# Patient Record
Sex: MALE | Race: BLACK OR AFRICAN AMERICAN | Employment: OTHER | URBAN - METROPOLITAN AREA
[De-identification: names, ages, dates, MRNs, and addresses within clinical notes are randomized per-mention and may not be internally consistent; named-entity substitution may affect disease eponyms.]

---

## 2017-08-01 ENCOUNTER — HOSPITAL ENCOUNTER (OUTPATIENT)
Dept: GENERAL RADIOLOGY | Age: 77
Discharge: HOME | End: 2017-08-01
Payer: MEDICARE

## 2017-08-01 DIAGNOSIS — J44.9 CHRONIC OBSTRUCTIVE PULMONARY DISEASE, UNSPECIFIED COPD TYPE (HCC): ICD-10-CM

## 2017-08-01 PROCEDURE — 71020 XR CHEST PA LAT: CPT

## 2017-08-12 ENCOUNTER — HOSPITAL ENCOUNTER (EMERGENCY)
Age: 77
Discharge: HOME | End: 2017-08-12
Attending: EMERGENCY MEDICINE
Payer: MEDICARE

## 2017-08-12 ENCOUNTER — APPOINTMENT (OUTPATIENT)
Dept: GENERAL RADIOLOGY | Age: 77
End: 2017-08-12
Attending: EMERGENCY MEDICINE
Payer: MEDICARE

## 2017-08-12 VITALS
HEIGHT: 67 IN | WEIGHT: 169 LBS | OXYGEN SATURATION: 97 % | RESPIRATION RATE: 18 BRPM | BODY MASS INDEX: 26.53 KG/M2 | SYSTOLIC BLOOD PRESSURE: 139 MMHG | DIASTOLIC BLOOD PRESSURE: 83 MMHG | HEART RATE: 88 BPM | TEMPERATURE: 97.9 F

## 2017-08-12 DIAGNOSIS — R07.9 CHEST PAIN, UNSPECIFIED TYPE: ICD-10-CM

## 2017-08-12 DIAGNOSIS — I48.20 CHRONIC ATRIAL FIBRILLATION WITH RAPID VENTRICULAR RESPONSE (HCC): Primary | ICD-10-CM

## 2017-08-12 DIAGNOSIS — R79.1 SUBTHERAPEUTIC INTERNATIONAL NORMALIZED RATIO (INR): ICD-10-CM

## 2017-08-12 LAB
ALBUMIN SERPL BCP-MCNC: 3.4 G/DL (ref 3.2–4.6)
ALBUMIN/GLOB SERPL: 1 {RATIO} (ref 1.2–3.5)
ALP SERPL-CCNC: 85 U/L (ref 50–136)
ALT SERPL-CCNC: 17 U/L (ref 12–65)
ANION GAP BLD CALC-SCNC: 12 MMOL/L (ref 7–16)
AST SERPL W P-5'-P-CCNC: 21 U/L (ref 15–37)
BASOPHILS # BLD AUTO: 0 K/UL (ref 0–0.2)
BASOPHILS # BLD: 0 % (ref 0–2)
BILIRUB SERPL-MCNC: 0.5 MG/DL (ref 0.2–1.1)
BUN SERPL-MCNC: 23 MG/DL (ref 8–23)
CALCIUM SERPL-MCNC: 8.6 MG/DL (ref 8.3–10.4)
CHLORIDE SERPL-SCNC: 106 MMOL/L (ref 98–107)
CO2 SERPL-SCNC: 24 MMOL/L (ref 21–32)
CREAT SERPL-MCNC: 1.42 MG/DL (ref 0.8–1.5)
DIFFERENTIAL METHOD BLD: ABNORMAL
EOSINOPHIL # BLD: 0.2 K/UL (ref 0–0.8)
EOSINOPHIL NFR BLD: 4 % (ref 0.5–7.8)
ERYTHROCYTE [DISTWIDTH] IN BLOOD BY AUTOMATED COUNT: 15.4 % (ref 11.9–14.6)
GLOBULIN SER CALC-MCNC: 3.5 G/DL (ref 2.3–3.5)
GLUCOSE SERPL-MCNC: 104 MG/DL (ref 65–100)
HCT VFR BLD AUTO: 36 % (ref 41.1–50.3)
HGB BLD-MCNC: 12.3 G/DL (ref 13.6–17.2)
IMM GRANULOCYTES # BLD: 0 K/UL (ref 0–0.5)
IMM GRANULOCYTES NFR BLD AUTO: 0.6 % (ref 0–5)
INR PPP: 1.6 (ref 0.9–1.2)
LYMPHOCYTES # BLD AUTO: 24 % (ref 13–44)
LYMPHOCYTES # BLD: 1.3 K/UL (ref 0.5–4.6)
MCH RBC QN AUTO: 28.1 PG (ref 26.1–32.9)
MCHC RBC AUTO-ENTMCNC: 34.2 G/DL (ref 31.4–35)
MCV RBC AUTO: 82.2 FL (ref 79.6–97.8)
MONOCYTES # BLD: 0.4 K/UL (ref 0.1–1.3)
MONOCYTES NFR BLD AUTO: 8 % (ref 4–12)
NEUTS SEG # BLD: 3.4 K/UL (ref 1.7–8.2)
NEUTS SEG NFR BLD AUTO: 63 % (ref 43–78)
PLATELET # BLD AUTO: 287 K/UL (ref 150–450)
PMV BLD AUTO: 10.6 FL (ref 10.8–14.1)
POTASSIUM SERPL-SCNC: 3.8 MMOL/L (ref 3.5–5.1)
PROT SERPL-MCNC: 6.9 G/DL (ref 6.3–8.2)
PROTHROMBIN TIME: 17 SEC (ref 9.6–12)
RBC # BLD AUTO: 4.38 M/UL (ref 4.23–5.67)
SODIUM SERPL-SCNC: 142 MMOL/L (ref 136–145)
TROPONIN I SERPL-MCNC: 0.05 NG/ML (ref 0.02–0.05)
TROPONIN I SERPL-MCNC: 0.05 NG/ML (ref 0.02–0.05)
WBC # BLD AUTO: 5.4 K/UL (ref 4.3–11.1)

## 2017-08-12 PROCEDURE — 71010 XR CHEST PORT: CPT

## 2017-08-12 PROCEDURE — 96374 THER/PROPH/DIAG INJ IV PUSH: CPT | Performed by: EMERGENCY MEDICINE

## 2017-08-12 PROCEDURE — 99285 EMERGENCY DEPT VISIT HI MDM: CPT | Performed by: EMERGENCY MEDICINE

## 2017-08-12 PROCEDURE — 96376 TX/PRO/DX INJ SAME DRUG ADON: CPT | Performed by: EMERGENCY MEDICINE

## 2017-08-12 PROCEDURE — 80053 COMPREHEN METABOLIC PANEL: CPT

## 2017-08-12 PROCEDURE — 85025 COMPLETE CBC W/AUTO DIFF WBC: CPT | Performed by: EMERGENCY MEDICINE

## 2017-08-12 PROCEDURE — 85610 PROTHROMBIN TIME: CPT | Performed by: EMERGENCY MEDICINE

## 2017-08-12 PROCEDURE — 74011250636 HC RX REV CODE- 250/636: Performed by: EMERGENCY MEDICINE

## 2017-08-12 PROCEDURE — 93005 ELECTROCARDIOGRAM TRACING: CPT | Performed by: EMERGENCY MEDICINE

## 2017-08-12 PROCEDURE — 96375 TX/PRO/DX INJ NEW DRUG ADDON: CPT | Performed by: EMERGENCY MEDICINE

## 2017-08-12 PROCEDURE — 74011000250 HC RX REV CODE- 250: Performed by: EMERGENCY MEDICINE

## 2017-08-12 PROCEDURE — 84484 ASSAY OF TROPONIN QUANT: CPT

## 2017-08-12 RX ORDER — DILTIAZEM HYDROCHLORIDE 5 MG/ML
10 INJECTION INTRAVENOUS
Status: COMPLETED | OUTPATIENT
Start: 2017-08-12 | End: 2017-08-12

## 2017-08-12 RX ORDER — ONDANSETRON 2 MG/ML
4 INJECTION INTRAMUSCULAR; INTRAVENOUS
Status: COMPLETED | OUTPATIENT
Start: 2017-08-12 | End: 2017-08-12

## 2017-08-12 RX ORDER — MORPHINE SULFATE 4 MG/ML
4 INJECTION, SOLUTION INTRAMUSCULAR; INTRAVENOUS
Status: COMPLETED | OUTPATIENT
Start: 2017-08-12 | End: 2017-08-12

## 2017-08-12 RX ADMIN — DILTIAZEM HYDROCHLORIDE 10 MG: 5 INJECTION INTRAVENOUS at 18:21

## 2017-08-12 RX ADMIN — MORPHINE SULFATE 4 MG: 4 INJECTION, SOLUTION INTRAMUSCULAR; INTRAVENOUS at 18:21

## 2017-08-12 RX ADMIN — ONDANSETRON 4 MG: 2 INJECTION INTRAMUSCULAR; INTRAVENOUS at 18:21

## 2017-08-12 RX ADMIN — DILTIAZEM HYDROCHLORIDE 10 MG: 5 INJECTION INTRAVENOUS at 19:22

## 2017-08-12 NOTE — ED PROVIDER NOTES
HPI Comments: 28-year-old Afro-American male complaining of chest pain. Some trouble breathing. Onset last night versus this morning. Fairly constant in nature. History of ANGELA Peoples chronically and on Coumadin. RN notes states that he was in his doctor's office earlier today that he now admits he was in the [facility] ER earlier today. Patient is a 76 y.o. male presenting with chest pain. The history is provided by the patient. Chest Pain (Angina)    This is a new problem. Episode onset: last night. The problem has not changed since onset. The problem occurs constantly. The pain is present in the substernal region. The pain is mild. The quality of the pain is described as pressure-like. The pain does not radiate. Associated symptoms include lower extremity edema, palpitations and shortness of breath. Pertinent negatives include no abdominal pain, no back pain, no cough, no diaphoresis, no fever, no headaches, no hemoptysis, no leg pain, no nausea, no numbness, no vomiting and no weakness. He has tried nothing for the symptoms. Pertinent negatives include no cardiac stents. Past Medical History:   Diagnosis Date    Chronic obstructive pulmonary disease ([address]     Dyspnea 7/21/2016       History reviewed. No pertinent surgical history. History reviewed. No pertinent family history. Social History     Social History    Marital status:      Spouse name: N/A    Number of children: N/A    Years of education: N/A     Occupational History    Not on file. Social History Main Topics    Smoking status: Never Smoker    Smokeless tobacco: Never Used    Alcohol use No    Drug use: Not on file    Sexual activity: Not on file     Other Topics Concern    Not on file     Social History Narrative         ALLERGIES: Levaquin [levofloxacin] and Prednisone    Review of Systems   Constitutional: Negative for chills, diaphoresis and fever.    HENT: Negative for congestion, rhinorrhea and sore throat. Eyes: Negative for photophobia and redness. Respiratory: Positive for shortness of breath. Negative for cough and hemoptysis. Cardiovascular: Positive for chest pain and palpitations. Negative for leg swelling. Gastrointestinal: Negative for abdominal pain, diarrhea, nausea and vomiting. Endocrine: Negative for polydipsia and polyuria. Genitourinary: Negative for dysuria. Musculoskeletal: Negative for back pain and myalgias. Neurological: Negative for weakness, numbness and headaches. Vitals:    08/12/17 1702   BP: 125/85   Pulse: 95   Resp: 18   Temp: 97.8 °F (36.6 °C)   SpO2: 99%   Weight: 76.7 kg (169 lb)   Height: 5' 7\" (1.702 m)            Physical Exam   Constitutional: He is oriented to person, place, and time. He appears well-developed and well-nourished. HENT:   Mouth/Throat: Oropharynx is clear and moist. No oropharyngeal exudate. Eyes: Conjunctivae are normal. Pupils are equal, round, and reactive to light. Neck: Neck supple. Cardiovascular: Normal heart sounds. An irregularly irregular rhythm present. Tachycardia present. Pulmonary/Chest: Effort normal and breath sounds normal.   Abdominal: Soft. Bowel sounds are normal. He exhibits no distension. There is no tenderness. There is no rebound and no guarding. Musculoskeletal: Normal range of motion. He exhibits no edema or tenderness. Lymphadenopathy:     He has no cervical adenopathy. Neurological: He is alert and oriented to person, place, and time. Skin: Skin is warm and dry. Nursing note and vitals reviewed. MDM  Number of Diagnoses or Management Options  Chest pain, unspecified type:   Chronic atrial fibrillation with rapid ventricular response ([address]):   Subtherapeutic international normalized ratio (INR):   Diagnosis management comments: This pain and chronic A. Fib. Cardizem to slow  rate a little. Rule out MI.   Review of notes from [facility] reveal it was a primarily social visit- patient complaining of not being able to care for himself at home and a niece and nephew are not willing to help. Social work was involved and sought avenues for help. Ultimately the patient was discharged home with instructions to follow-up with PCP and seek assisted living options. 9:48 PM  Troponin ×2 negative. Rate now controlled. We'll recommend they increase dose of Coumadin this evening. Have arranged for outpatient follow-up with the cardiology office in the next 72 hours for an INR and heart rate recheck.        Amount and/or Complexity of Data Reviewed  Clinical lab tests: ordered and reviewed (Results for orders placed or performed during the hospital encounter of 08/12/17  -CBC WITH AUTOMATED DIFF       Result                                            Value                         Ref Range                       WBC                                               5.4                           4.3 - 11.1 K/uL                 RBC                                               4.38                          4.23 - 5.67 M/uL                HGB                                               12.3 (L)                      13.6 - 17.2 g/dL                HCT                                               36.0 (L)                      41.1 - 50.3 %                   MCV                                               82.2                          79.6 - 97.8 FL                  MCH                                               28.1                          26.1 - 32.9 PG                  MCHC                                              34.2                          31.4 - 35.0 g/dL                RDW                                               15.4 (H)                      11.9 - 14.6 %                   PLATELET                                          287                           150 - 450 K/uL                  MPV                                               10.6 (L)                      10.8 - 14.1 FL DF                                                AUTOMATED                                                     NEUTROPHILS                                       63                            43 - 78 %                       LYMPHOCYTES                                       24                            13 - 44 %                       MONOCYTES                                         8                             4.0 - 12.0 %                    EOSINOPHILS                                       4                             0.5 - 7.8 %                     BASOPHILS                                         0                             0.0 - 2.0 %                     IMMATURE GRANULOCYTES                             0.6                           0.0 - 5.0 %                     ABS. NEUTROPHILS                                  3.4                           1.7 - 8.2 K/UL                  ABS. LYMPHOCYTES                                  1.3                           0.5 - 4.6 K/UL                  ABS. MONOCYTES                                    0.4                           0.1 - 1.3 K/UL                  ABS. EOSINOPHILS                                  0.2                           0.0 - 0.8 K/UL                  ABS. BASOPHILS                                    0.0                           0.0 - 0.2 K/UL                  ABS. IMM.  GRANS.                                  0.0                           0.0 - 0.5 K/UL             -PROTHROMBIN TIME + INR       Result                                            Value                         Ref Range                       Prothrombin time                                  17.0 (H)                      9.6 - 12.0 sec                  INR                                               1.6 (H)                       0.9 - 1.2                  -METABOLIC PANEL, COMPREHENSIVE       Result                                            Value                         Ref Range Sodium                                            142                           136 - 145 mmol/L                Potassium                                         3.8                           3.5 - 5.1 mmol/L                Chloride                                          106                           98 - 107 mmol/L                 CO2                                               24                            21 - 32 mmol/L                  Anion gap                                         12                            7 - 16 mmol/L                   Glucose                                           104 (H)                       65 - 100 mg/dL                  BUN                                               23                            8 - 23 MG/DL                    Creatinine                                        1.42                          0.8 - 1.5 MG/DL                 GFR est AA                                        >60                           >60 ml/min/1.73m2               GFR est non-AA                                    51 (L)                        >60 ml/min/1.73m2               Calcium                                           8.6                           8.3 - 10.4 MG/DL                Bilirubin, total                                  0.5                           0.2 - 1.1 MG/DL                 ALT (SGPT)                                        17                            12 - 65 U/L                     AST (SGOT)                                        21                            15 - 37 U/L                     Alk. phosphatase                                  85                            50 - 136 U/L                    Protein, total                                    6.9                           6.3 - 8.2 g/dL                  Albumin                                           3.4                           3.2 - 4.6 g/dL                  Globulin 3.5                           2.3 - 3.5 g/dL                  A-G Ratio                                         1.0 (L)                       1.2 - 3.5                  -TROPONIN I       Result                                            Value                         Ref Range                       Troponin-I, Qt.                                   0.05                          0.02 - 0.05 NG/ML          -TROPONIN I       Result                                            Value                         Ref Range                       Troponin-I, Qt.                                   0.05                          0.02 - 0.05 NG/ML          )  Tests in the radiology section of CPT®: ordered and reviewed (Xr Chest Port    Result Date: 8/12/2017  HISTORY: Chest pain and shortness of breath beginning today. EXAM: AP chest radiograph PRIOR STUDY: 08/01/2017 FINDINGS: The cardiomediastinal silhouette is normal. There is unchanged elevation of the left hemidiaphragm. No focal consolidation, pleural effusion, or pneumothorax. IMPRESSION: No acute cardiopulmonary process.     )      ED Course       Procedures

## 2017-08-12 NOTE — ED TRIAGE NOTES
Pt states he was at his md office earlier today and was told that his heart was beating too fast. Pt also states he is having chest pain that started today. Pt has second complaint of right hip and lower back pain.

## 2017-08-13 LAB
ATRIAL RATE: 88 BPM
CALCULATED R AXIS, ECG10: -42 DEGREES
CALCULATED T AXIS, ECG11: 85 DEGREES
DIAGNOSIS, 93000: NORMAL
Q-T INTERVAL, ECG07: 326 MS
QRS DURATION, ECG06: 108 MS
QTC CALCULATION (BEZET), ECG08: 466 MS
VENTRICULAR RATE, ECG03: 123 BPM

## 2017-08-13 NOTE — ED NOTES
I have reviewed discharge instructions with the patient. The patient verbalized understanding. Patient ambulatory upon discharge to waiting room to await ride home.

## 2017-08-13 NOTE — DISCHARGE INSTRUCTIONS
Atrial Fibrillation: Care Instructions  Your Care Instructions    Atrial fibrillation is an irregular and often fast heartbeat. Treating this condition is important for several reasons. It can cause blood clots, which can travel from your heart to your brain and cause a stroke. If you have a fast heartbeat, you may feel lightheaded, dizzy, and weak. An irregular heartbeat can also increase your risk for heart failure. Atrial fibrillation is often the result of another heart condition, such as high blood pressure or coronary artery disease. Making changes to improve your heart condition will help you stay healthy and active. Follow-up care is a key part of your treatment and safety. Be sure to make and go to all appointments, and call your doctor if you are having problems. It's also a good idea to know your test results and keep a list of the medicines you take. How can you care for yourself at home? Medicines  · Take your medicines exactly as prescribed. Call your doctor if you think you are having a problem with your medicine. You will get more details on the specific medicines your doctor prescribes. · If your doctor has given you a blood thinner to prevent a stroke, be sure you get instructions about how to take your medicine safely. Blood thinners can cause serious bleeding problems. · Do not take any vitamins, over-the-counter drugs, or herbal products without talking to your doctor first.  Lifestyle changes  · Do not smoke. Smoking can increase your chance of a stroke and heart attack. If you need help quitting, talk to your doctor about stop-smoking programs and medicines. These can increase your chances of quitting for good. · Eat a heart-healthy diet. · Stay at a healthy weight. Lose weight if you need to. · Limit alcohol to 2 drinks a day for men and 1 drink a day for women. Too much alcohol can cause health problems. · Avoid colds and flu. Get a pneumococcal vaccine shot.  If you have had one before, ask your doctor whether you need another dose. Get a flu shot every year. If you must be around people with colds or flu, wash your hands often. Activity  · If your doctor recommends it, get more exercise. Walking is a good choice. Bit by bit, increase the amount you walk every day. Try for at least 30 minutes on most days of the week. You also may want to swim, bike, or do other activities. Your doctor may suggest that you join a cardiac rehabilitation program so that you can have help increasing your physical activity safely. · Start light exercise if your doctor says it is okay. Even a small amount will help you get stronger, have more energy, and manage stress. Walking is an easy way to get exercise. Start out by walking a little more than you did in the hospital. Gradually increase the amount you walk. · When you exercise, watch for signs that your heart is working too hard. You are pushing too hard if you cannot talk while you are exercising. If you become short of breath or dizzy or have chest pain, sit down and rest immediately. · Check your pulse regularly. Place two fingers on the artery at the palm side of your wrist, in line with your thumb. If your heartbeat seems uneven or fast, talk to your doctor. When should you call for help? Call 911 anytime you think you may need emergency care. For example, call if:  · You have symptoms of a heart attack. These may include:  ¨ Chest pain or pressure, or a strange feeling in the chest.  ¨ Sweating. ¨ Shortness of breath. ¨ Nausea or vomiting. ¨ Pain, pressure, or a strange feeling in the back, neck, jaw, or upper belly or in one or both shoulders or arms. ¨ Lightheadedness or sudden weakness. ¨ A fast or irregular heartbeat. After you call 911, the  may tell you to chew 1 adult-strength or 2 to 4 low-dose aspirin. Wait for an ambulance. Do not try to drive yourself. · You have symptoms of a stroke.  These may include:  ¨ Sudden numbness, tingling, weakness, or loss of movement in your face, arm, or leg, especially on only one side of your body. ¨ Sudden vision changes. ¨ Sudden trouble speaking. ¨ Sudden confusion or trouble understanding simple statements. ¨ Sudden problems with walking or balance. ¨ A sudden, severe headache that is different from past headaches. · You passed out (lost consciousness). Call your doctor now or seek immediate medical care if:  · You have new or increased shortness of breath. · You feel dizzy or lightheaded, or you feel like you may faint. · Your heart rate becomes irregular. · You can feel your heart flutter in your chest or skip heartbeats. Tell your doctor if these symptoms are new or worse. Watch closely for changes in your health, and be sure to contact your doctor if you have any problems. Where can you learn more? Go to http://mary-les.info/. Enter U020 in the search box to learn more about \"Atrial Fibrillation: Care Instructions. \"  Current as of: September 21, 2016  Content Version: 11.3  © 7735-8733 [employer]. Care instructions adapted under license by [employer] (which disclaims liability or warranty for this information). If you have questions about a medical condition or this instruction, always ask your healthcare professional. [address] any warranty or liability for your use of this information.

## 2017-08-17 ENCOUNTER — APPOINTMENT (OUTPATIENT)
Dept: GENERAL RADIOLOGY | Age: 77
End: 2017-08-17
Attending: EMERGENCY MEDICINE
Payer: MEDICARE

## 2017-08-17 ENCOUNTER — HOSPITAL ENCOUNTER (EMERGENCY)
Age: 77
Discharge: HOME | End: 2017-08-17
Attending: EMERGENCY MEDICINE
Payer: MEDICARE

## 2017-08-17 VITALS
WEIGHT: 159 LBS | TEMPERATURE: 98.2 F | HEART RATE: 87 BPM | HEIGHT: 67 IN | OXYGEN SATURATION: 100 % | SYSTOLIC BLOOD PRESSURE: 139 MMHG | DIASTOLIC BLOOD PRESSURE: 83 MMHG | RESPIRATION RATE: 21 BRPM | BODY MASS INDEX: 24.96 KG/M2

## 2017-08-17 DIAGNOSIS — R07.9 CHEST PAIN, UNSPECIFIED TYPE: Primary | ICD-10-CM

## 2017-08-17 LAB
ALBUMIN SERPL-MCNC: 3.5 G/DL (ref 3.2–4.6)
ALBUMIN/GLOB SERPL: 1 {RATIO} (ref 1.2–3.5)
ALP SERPL-CCNC: 81 U/L (ref 50–136)
ALT SERPL-CCNC: 14 U/L (ref 12–65)
ANION GAP SERPL CALC-SCNC: 12 MMOL/L (ref 7–16)
AST SERPL-CCNC: 17 U/L (ref 15–37)
ATRIAL RATE: 208 BPM
ATRIAL RATE: 326 BPM
BASOPHILS # BLD: 0 K/UL (ref 0–0.2)
BASOPHILS NFR BLD: 1 % (ref 0–2)
BILIRUB SERPL-MCNC: 0.7 MG/DL (ref 0.2–1.1)
BNP SERPL-MCNC: 69 PG/ML
BUN SERPL-MCNC: 26 MG/DL (ref 8–23)
CALCIUM SERPL-MCNC: 9.2 MG/DL (ref 8.3–10.4)
CALCULATED R AXIS, ECG10: -39 DEGREES
CALCULATED R AXIS, ECG10: -42 DEGREES
CALCULATED T AXIS, ECG11: -24 DEGREES
CALCULATED T AXIS, ECG11: -33 DEGREES
CHLORIDE SERPL-SCNC: 106 MMOL/L (ref 98–107)
CO2 SERPL-SCNC: 21 MMOL/L (ref 21–32)
CREAT SERPL-MCNC: 1.71 MG/DL (ref 0.8–1.5)
D DIMER PPP FEU-MCNC: 0.2 UG/ML(FEU)
DIAGNOSIS, 93000: NORMAL
DIAGNOSIS, 93000: NORMAL
DIFFERENTIAL METHOD BLD: ABNORMAL
EOSINOPHIL # BLD: 0 K/UL (ref 0–0.8)
EOSINOPHIL NFR BLD: 1 % (ref 0.5–7.8)
ERYTHROCYTE [DISTWIDTH] IN BLOOD BY AUTOMATED COUNT: 14.5 % (ref 11.9–14.6)
GLOBULIN SER CALC-MCNC: 3.6 G/DL (ref 2.3–3.5)
GLUCOSE SERPL-MCNC: 110 MG/DL (ref 65–100)
HCT VFR BLD AUTO: 35 % (ref 41.1–50.3)
HGB BLD-MCNC: 11.7 G/DL (ref 13.6–17.2)
IMM GRANULOCYTES # BLD: 0 K/UL (ref 0–0.5)
IMM GRANULOCYTES NFR BLD: 0.4 % (ref 0–5)
INR PPP: 2.5 (ref 0.9–1.2)
LYMPHOCYTES # BLD: 1 K/UL (ref 0.5–4.6)
LYMPHOCYTES NFR BLD: 20 % (ref 13–44)
MCH RBC QN AUTO: 28.3 PG (ref 26.1–32.9)
MCHC RBC AUTO-ENTMCNC: 33.4 G/DL (ref 31.4–35)
MCV RBC AUTO: 84.5 FL (ref 79.6–97.8)
MONOCYTES # BLD: 0.3 K/UL (ref 0.1–1.3)
MONOCYTES NFR BLD: 7 % (ref 4–12)
NEUTS SEG # BLD: 3.5 K/UL (ref 1.7–8.2)
NEUTS SEG NFR BLD: 71 % (ref 43–78)
PLATELET # BLD AUTO: 271 K/UL (ref 150–450)
PMV BLD AUTO: 9.3 FL (ref 10.8–14.1)
POTASSIUM SERPL-SCNC: 4 MMOL/L (ref 3.5–5.1)
PROT SERPL-MCNC: 7.1 G/DL (ref 6.3–8.2)
PROTHROMBIN TIME: 27 SEC (ref 9.6–12)
Q-T INTERVAL, ECG07: 354 MS
Q-T INTERVAL, ECG07: 360 MS
QRS DURATION, ECG06: 104 MS
QRS DURATION, ECG06: 106 MS
QTC CALCULATION (BEZET), ECG08: 411 MS
QTC CALCULATION (BEZET), ECG08: 435 MS
RBC # BLD AUTO: 4.14 M/UL (ref 4.23–5.67)
SODIUM SERPL-SCNC: 139 MMOL/L (ref 136–145)
TROPONIN I BLD-MCNC: 0.01 NG/ML (ref 0–0.08)
TROPONIN I SERPL-MCNC: 0.04 NG/ML (ref 0.02–0.05)
VENTRICULAR RATE, ECG03: 81 BPM
VENTRICULAR RATE, ECG03: 88 BPM
WBC # BLD AUTO: 4.9 K/UL (ref 4.3–11.1)

## 2017-08-17 PROCEDURE — 83880 ASSAY OF NATRIURETIC PEPTIDE: CPT | Performed by: EMERGENCY MEDICINE

## 2017-08-17 PROCEDURE — 85025 COMPLETE CBC W/AUTO DIFF WBC: CPT | Performed by: EMERGENCY MEDICINE

## 2017-08-17 PROCEDURE — 85610 PROTHROMBIN TIME: CPT | Performed by: EMERGENCY MEDICINE

## 2017-08-17 PROCEDURE — 80053 COMPREHEN METABOLIC PANEL: CPT | Performed by: EMERGENCY MEDICINE

## 2017-08-17 PROCEDURE — 99284 EMERGENCY DEPT VISIT MOD MDM: CPT | Performed by: EMERGENCY MEDICINE

## 2017-08-17 PROCEDURE — 93005 ELECTROCARDIOGRAM TRACING: CPT | Performed by: EMERGENCY MEDICINE

## 2017-08-17 PROCEDURE — 71020 XR CHEST PA LAT: CPT

## 2017-08-17 PROCEDURE — 84484 ASSAY OF TROPONIN QUANT: CPT | Performed by: EMERGENCY MEDICINE

## 2017-08-17 PROCEDURE — 85379 FIBRIN DEGRADATION QUANT: CPT | Performed by: EMERGENCY MEDICINE

## 2017-08-17 NOTE — ED PROVIDER NOTES
HPI Comments: 76 male history of COPD, congestive heart failure, CAD is still complaining of chest pain. Started approximately 8:30 AM.  No syncopal episode. Associated shortness of breath. No leg swelling. Denies any weight gain. Stating he's been taken all of his medication as prescribed. Saw the cardiologist yesterday for A. Fib. I told him to change his Cardizem to taken it in the morning. He denies any fever. He took 2 sublingual nitroglycerin 1 at approximately 9:00 and one at 910 with complete resolution of pain. Had a complaining of headaches after taking the nitroglycerin. Stated right now his chest pain is slightly coming back. Currently 1 out of 10. Midsternal.  He has history of chronic shortness of breath. That has not changed in nature. Denies any coughing, fever. Patient is a 76 y.o. male presenting with chest pain. The history is provided by the patient. Chest Pain    Associated symptoms include shortness of breath. Pertinent negatives include no abdominal pain, no back pain, no diaphoresis, no dizziness, no fever, no headaches, no nausea and no vomiting. Past Medical History:   Diagnosis Date    [facility] obstructive pulmonary disease ([facility] [address]     Dyspnea 7/21/2016    Gastrointestinal disorder     Heart failure ([facility] [address]        Past Surgical History:   Procedure Laterality Date    HX GI      HX ORTHOPAEDIC           History reviewed. No pertinent family history. Social History     Social History    Marital status:      Spouse name: N/A    Number of children: N/A    Years of education: N/A     Occupational History    Not on file.      Social History Main Topics    Smoking status: Never Smoker    Smokeless tobacco: Never Used    Alcohol use No    Drug use: Not on file    Sexual activity: Not on file     Other Topics Concern    Not on file     Social History Narrative         ALLERGIES: Levaquin [levofloxacin] and Prednisone    Review of Systems Constitutional: Negative for activity change, appetite change, diaphoresis and fever. HENT: Negative for congestion and ear pain. Eyes: Negative for discharge. Respiratory: Positive for shortness of breath. Negative for chest tightness. Cardiovascular: Positive for chest pain. Gastrointestinal: Negative for abdominal pain, constipation, diarrhea, nausea and vomiting. Genitourinary: Negative for difficulty urinating, flank pain, hematuria and testicular pain. Musculoskeletal: Negative for back pain. Skin: Negative for color change, pallor and rash. Neurological: Negative for dizziness, seizures, syncope and headaches. All other systems reviewed and are negative. Vitals:    08/17/17 1144   BP: 107/80   Pulse: 89   Resp: 16   Temp: 98.5 °F (36.9 °C)   SpO2: 98%   Weight: 72.1 kg (159 lb)   Height: 5' 7\" (1.702 m)            Physical Exam   Constitutional: He is oriented to person, place, and time. He appears well-developed and well-nourished. HENT:   Mouth/Throat: Oropharynx is clear and moist. No oropharyngeal exudate. Eyes: Conjunctivae are normal. Pupils are equal, round, and reactive to light. Neck: Normal range of motion. Neck supple. Cardiovascular: Normal rate, regular rhythm and normal heart sounds. Pulmonary/Chest: Effort normal and breath sounds normal.   Abdominal: Soft. Bowel sounds are normal. He exhibits no distension and no mass. There is no tenderness. There is no rebound and no guarding. Musculoskeletal: Normal range of motion. He exhibits no edema or tenderness. Lymphadenopathy:     He has no cervical adenopathy. Neurological: He is alert and oriented to person, place, and time. Skin: Skin is warm and dry. Nursing note and vitals reviewed. MDM  Number of Diagnoses or Management Options  Diagnosis management comments: EKG obtained and interpreted by me. Rate of 88. Atrial fibrillation. Left axis deviation.   No ST elevation that is suspicious for acute STEMI ischemia. Nonspecific T-wave changes noted laterally. Unchanged from previous EKG. Initial troponin 0.04. His previous troponin were 0.05. Relatively unchanged renal function. No abdominal pain on exam.  Equal radial pulses. Have a low suspicion for dissection. Chest x-ray without any obvious consolidation, signs of pneumothorax. We'll send BNP. We'll reassess. He took 4 baby aspirin today as well. He is on Coumadin. INR 2.5. Amount and/or Complexity of Data Reviewed  Clinical lab tests: reviewed and ordered  Tests in the radiology section of CPT®: ordered and reviewed      ED Course     Results for orders placed or performed during the hospital encounter of 08/17/17   PROTHROMBIN TIME + INR   Result Value Ref Range    Prothrombin time 27.0 (H) 9.6 - 12.0 sec    INR 2.5 (H) 0.9 - 1.2     TROPONIN I   Result Value Ref Range    Troponin-I, Qt. 0.04 0.02 - 0.05 NG/ML   CBC WITH AUTOMATED DIFF   Result Value Ref Range    WBC 4.9 4.3 - 11.1 K/uL    RBC 4.14 (L) 4.23 - 5.67 M/uL    HGB 11.7 (L) 13.6 - 17.2 g/dL    HCT 35.0 (L) 41.1 - 50.3 %    MCV 84.5 79.6 - 97.8 FL    MCH 28.3 26.1 - 32.9 PG    MCHC 33.4 31.4 - 35.0 g/dL    RDW 14.5 11.9 - 14.6 %    PLATELET 091 972 - 431 K/uL    MPV 9.3 (L) 10.8 - 14.1 FL    DF AUTOMATED      NEUTROPHILS 71 43 - 78 %    LYMPHOCYTES 20 13 - 44 %    MONOCYTES 7 4.0 - 12.0 %    EOSINOPHILS 1 0.5 - 7.8 %    BASOPHILS 1 0.0 - 2.0 %    IMMATURE GRANULOCYTES 0.4 0.0 - 5.0 %    ABS. NEUTROPHILS 3.5 1.7 - 8.2 K/UL    ABS. LYMPHOCYTES 1.0 0.5 - 4.6 K/UL    ABS. MONOCYTES 0.3 0.1 - 1.3 K/UL    ABS. EOSINOPHILS 0.0 0.0 - 0.8 K/UL    ABS. BASOPHILS 0.0 0.0 - 0.2 K/UL    ABS. IMM.  GRANS. 0.0 0.0 - 0.5 K/UL   METABOLIC PANEL, COMPREHENSIVE   Result Value Ref Range    Sodium 139 136 - 145 mmol/L    Potassium 4.0 3.5 - 5.1 mmol/L    Chloride 106 98 - 107 mmol/L    CO2 21 21 - 32 mmol/L    Anion gap 12 7 - 16 mmol/L    Glucose 110 (H) 65 - 100 mg/dL    BUN 26 (H) 8 - 23 MG/DL    Creatinine 1.71 (H) 0.8 - 1.5 MG/DL    GFR est AA 50 (L) >60 ml/min/1.73m2    GFR est non-AA 41 (L) >60 ml/min/1.73m2    Calcium 9.2 8.3 - 10.4 MG/DL    Bilirubin, total 0.7 0.2 - 1.1 MG/DL    ALT (SGPT) 14 12 - 65 U/L    AST (SGOT) 17 15 - 37 U/L    Alk. phosphatase 81 50 - 136 U/L    Protein, total 7.1 6.3 - 8.2 g/dL    Albumin 3.5 3.2 - 4.6 g/dL    Globulin 3.6 (H) 2.3 - 3.5 g/dL    A-G Ratio 1.0 (L) 1.2 - 3.5     EKG, 12 LEAD, INITIAL   Result Value Ref Range    Ventricular Rate 88 BPM    Atrial Rate 208 BPM    QRS Duration 106 ms    Q-T Interval 360 ms    QTC Calculation (Bezet) 435 ms    Calculated R Axis -39 degrees    Calculated T Axis -24 degrees    Diagnosis       Atrial fibrillation  Left axis deviation  Voltage criteria for left ventricular hypertrophy  Nonspecific T wave abnormality  Abnormal ECG  When compared with ECG of 12-AUG-2017 17:03,  ST now depressed in Inferior leads  T wave inversion now evident in Inferior leads  Nonspecific T wave abnormality now evident in Anterior leads  Nonspecific T wave abnormality, improved in Lateral leads         XR CHEST PA LAT   Final Result   IMPRESSION: No acute findings in the chest           1600 - EKG #2 obtained and interpreted by me. Rate of 81. A. Fib. Left axis deviation. No ST changes status consistent with acute STEMI ischemia. Nonspecific T wave inversion noted laterally unchanged from EKG #1 and from his previous EKG. Serial troponins are negative. It is down trending. I do not suspect this is an acute ACS episode. He was recently seen by his cardiologist.  At this time I will discharge him home. We'll call the appetite hour message line for [facility] cardiology for follow-up with this patient. He is stable. He has no chest pain. He is stable for discharge at this time. Dragon voice recognition software was used to create this note.  Although the note has been reviewed and corrected where necessary, additional errors may have been overlooked and remain in the text.     Procedures

## 2017-08-17 NOTE — DISCHARGE INSTRUCTIONS
Chest Pain: Care Instructions  Your Care Instructions  There are many things that can cause chest pain. Some are not serious and will get better on their own in a few days. But some kinds of chest pain need more testing and treatment. Your doctor may have recommended a follow-up visit in the next 8 to 12 hours. If you are not getting better, you may need more tests or treatment. Even though your doctor has released you, you still need to watch for any problems. The doctor carefully checked you, but sometimes problems can develop later. If you have new symptoms or if your symptoms do not get better, get medical care right away. If you have worse or different chest pain or pressure that lasts more than 5 minutes or you passed out (lost consciousness), call 911 or seek other emergency help right away. A medical visit is only one step in your treatment. Even if you feel better, you still need to do what your doctor recommends, such as going to all suggested follow-up appointments and taking medicines exactly as directed. This will help you recover and help prevent future problems. How can you care for yourself at home? · Rest until you feel better. · Take your medicine exactly as prescribed. Call your doctor if you think you are having a problem with your medicine. · Do not drive after taking a prescription pain medicine. When should you call for help? Call 911 if:  · You passed out (lost consciousness). · You have severe difficulty breathing. · You have symptoms of a heart attack. These may include:  ¨ Chest pain or pressure, or a strange feeling in your chest.  ¨ Sweating. ¨ Shortness of breath. ¨ Nausea or vomiting. ¨ Pain, pressure, or a strange feeling in your back, neck, jaw, or upper belly or in one or both shoulders or arms. ¨ Lightheadedness or sudden weakness. ¨ A fast or irregular heartbeat.   After you call 911, the  may tell you to chew 1 adult-strength or 2 to 4 low-dose aspirin. Wait for an ambulance. Do not try to drive yourself. Call your doctor today if:  · You have any trouble breathing. · Your chest pain gets worse. · You are dizzy or lightheaded, or you feel like you may faint. · You are not getting better as expected. · You are having new or different chest pain. Where can you learn more? Go to http://amry-les.info/. Enter A120 in the search box to learn more about \"Chest Pain: Care Instructions. \"  Current as of: March 20, 2017  Content Version: 11.3  © 3030-9764 [employer]. Care instructions adapted under license by [employer] (which disclaims liability or warranty for this information). If you have questions about a medical condition or this instruction, always ask your healthcare professional. [address] any warranty or liability for your use of this information.

## 2018-01-22 ENCOUNTER — ANESTHESIA (OUTPATIENT)
Dept: SURGERY | Age: 78
End: 2018-01-22
Payer: MEDICARE

## 2018-01-22 ENCOUNTER — HOSPITAL ENCOUNTER (OUTPATIENT)
Age: 78
Setting detail: OBSERVATION
Discharge: NURSING, REHAB OR LONG-TERM CARE | End: 2018-01-26
Attending: INTERNAL MEDICINE | Admitting: INTERNAL MEDICINE
Payer: MEDICARE

## 2018-01-22 ENCOUNTER — APPOINTMENT (OUTPATIENT)
Dept: CARDIAC CATH/INVASIVE PROCEDURES | Age: 78
End: 2018-01-22
Payer: MEDICARE

## 2018-01-22 ENCOUNTER — ANESTHESIA EVENT (OUTPATIENT)
Dept: SURGERY | Age: 78
End: 2018-01-22
Payer: MEDICARE

## 2018-01-22 ENCOUNTER — APPOINTMENT (OUTPATIENT)
Dept: GENERAL RADIOLOGY | Age: 78
End: 2018-01-22
Attending: INTERNAL MEDICINE
Payer: MEDICARE

## 2018-01-22 PROBLEM — I42.9 CARDIOMYOPATHY (HCC): Status: ACTIVE | Noted: 2018-01-22

## 2018-01-22 LAB
ALBUMIN SERPL-MCNC: 3.5 G/DL (ref 3.2–4.6)
ALBUMIN/GLOB SERPL: 0.8 {RATIO} (ref 1.2–3.5)
ALP SERPL-CCNC: 94 U/L (ref 50–136)
ALT SERPL-CCNC: 14 U/L (ref 12–65)
ANION GAP SERPL CALC-SCNC: 14 MMOL/L (ref 7–16)
AST SERPL-CCNC: 18 U/L (ref 15–37)
ATRIAL RATE: 108 BPM
ATRIAL RATE: 119 BPM
BILIRUB SERPL-MCNC: 0.4 MG/DL (ref 0.2–1.1)
BUN SERPL-MCNC: 33 MG/DL (ref 8–23)
CALCIUM SERPL-MCNC: 9.4 MG/DL (ref 8.3–10.4)
CALCULATED R AXIS, ECG10: -43 DEGREES
CALCULATED R AXIS, ECG10: -46 DEGREES
CALCULATED T AXIS, ECG11: 58 DEGREES
CALCULATED T AXIS, ECG11: 96 DEGREES
CHLORIDE SERPL-SCNC: 107 MMOL/L (ref 98–107)
CO2 SERPL-SCNC: 19 MMOL/L (ref 21–32)
CREAT SERPL-MCNC: 1.24 MG/DL (ref 0.8–1.5)
DIAGNOSIS, 93000: NORMAL
DIAGNOSIS, 93000: NORMAL
ERYTHROCYTE [DISTWIDTH] IN BLOOD BY AUTOMATED COUNT: 13.9 % (ref 11.9–14.6)
GLOBULIN SER CALC-MCNC: 4.4 G/DL (ref 2.3–3.5)
GLUCOSE SERPL-MCNC: 98 MG/DL (ref 65–100)
HCT VFR BLD AUTO: 39.9 % (ref 41.1–50.3)
HGB BLD-MCNC: 13.2 G/DL (ref 13.6–17.2)
INR PPP: 2.1
MAGNESIUM SERPL-MCNC: 1.9 MG/DL (ref 1.8–2.4)
MCH RBC QN AUTO: 29.1 PG (ref 26.1–32.9)
MCHC RBC AUTO-ENTMCNC: 33.1 G/DL (ref 31.4–35)
MCV RBC AUTO: 88.1 FL (ref 79.6–97.8)
PLATELET # BLD AUTO: 238 K/UL (ref 150–450)
PMV BLD AUTO: 9.1 FL (ref 10.8–14.1)
POTASSIUM SERPL-SCNC: 4.4 MMOL/L (ref 3.5–5.1)
PROT SERPL-MCNC: 7.9 G/DL (ref 6.3–8.2)
PROTHROMBIN TIME: 23.1 SEC (ref 11.5–14.5)
Q-T INTERVAL, ECG07: 312 MS
Q-T INTERVAL, ECG07: 320 MS
QRS DURATION, ECG06: 104 MS
QRS DURATION, ECG06: 110 MS
QTC CALCULATION (BEZET), ECG08: 430 MS
QTC CALCULATION (BEZET), ECG08: 452 MS
RBC # BLD AUTO: 4.53 M/UL (ref 4.23–5.67)
SODIUM SERPL-SCNC: 140 MMOL/L (ref 136–145)
VENTRICULAR RATE, ECG03: 114 BPM
VENTRICULAR RATE, ECG03: 120 BPM
WBC # BLD AUTO: 7.1 K/UL (ref 4.3–11.1)

## 2018-01-22 PROCEDURE — 33225 L VENTRIC PACING LEAD ADD-ON: CPT

## 2018-01-22 PROCEDURE — C1892 INTRO/SHEATH,FIXED,PEEL-AWAY: HCPCS

## 2018-01-22 PROCEDURE — 77030013687 HC GD NDL BARD -B

## 2018-01-22 PROCEDURE — C1773 RET DEV, INSERTABLE: HCPCS

## 2018-01-22 PROCEDURE — 74011000272 HC RX REV CODE- 272: Performed by: INTERNAL MEDICINE

## 2018-01-22 PROCEDURE — 74011250636 HC RX REV CODE- 250/636: Performed by: INTERNAL MEDICINE

## 2018-01-22 PROCEDURE — 74011250636 HC RX REV CODE- 250/636

## 2018-01-22 PROCEDURE — 33208 INSRT HEART PM ATRIAL & VENT: CPT

## 2018-01-22 PROCEDURE — 85027 COMPLETE CBC AUTOMATED: CPT | Performed by: INTERNAL MEDICINE

## 2018-01-22 PROCEDURE — 77030016570 HC BLNKT BAIR HGGR 3M -B: Performed by: NURSE ANESTHETIST, CERTIFIED REGISTERED

## 2018-01-22 PROCEDURE — C2621 PMKR, OTHER THAN SING/DUAL: HCPCS

## 2018-01-22 PROCEDURE — 74011000250 HC RX REV CODE- 250: Performed by: INTERNAL MEDICINE

## 2018-01-22 PROCEDURE — 76937 US GUIDE VASCULAR ACCESS: CPT

## 2018-01-22 PROCEDURE — C1900 LEAD, CORONARY VENOUS: HCPCS

## 2018-01-22 PROCEDURE — 93005 ELECTROCARDIOGRAM TRACING: CPT | Performed by: INTERNAL MEDICINE

## 2018-01-22 PROCEDURE — C1751 CATH, INF, PER/CENT/MIDLINE: HCPCS

## 2018-01-22 PROCEDURE — A4565 SLINGS: HCPCS

## 2018-01-22 PROCEDURE — 74011636320 HC RX REV CODE- 636/320: Performed by: INTERNAL MEDICINE

## 2018-01-22 PROCEDURE — 85610 PROTHROMBIN TIME: CPT | Performed by: INTERNAL MEDICINE

## 2018-01-22 PROCEDURE — 80053 COMPREHEN METABOLIC PANEL: CPT | Performed by: INTERNAL MEDICINE

## 2018-01-22 PROCEDURE — 83735 ASSAY OF MAGNESIUM: CPT | Performed by: INTERNAL MEDICINE

## 2018-01-22 PROCEDURE — C1887 CATHETER, GUIDING: HCPCS

## 2018-01-22 PROCEDURE — 76060000036 HC ANESTHESIA 2.5 TO 3 HR: Performed by: INTERNAL MEDICINE

## 2018-01-22 PROCEDURE — 94640 AIRWAY INHALATION TREATMENT: CPT

## 2018-01-22 PROCEDURE — 71045 X-RAY EXAM CHEST 1 VIEW: CPT

## 2018-01-22 PROCEDURE — C1898 LEAD, PMKR, OTHER THAN TRANS: HCPCS

## 2018-01-22 PROCEDURE — 74011000250 HC RX REV CODE- 250

## 2018-01-22 PROCEDURE — C1769 GUIDE WIRE: HCPCS

## 2018-01-22 PROCEDURE — 74011250637 HC RX REV CODE- 250/637: Performed by: INTERNAL MEDICINE

## 2018-01-22 PROCEDURE — 99218 HC RM OBSERVATION: CPT

## 2018-01-22 PROCEDURE — 77030012935 HC DRSG AQUACEL BMS -B

## 2018-01-22 PROCEDURE — 77030013140 HC MSK NEB VYRM -A

## 2018-01-22 RX ORDER — DIGOXIN 125 MCG
125 TABLET ORAL
COMMUNITY
End: 2018-01-26

## 2018-01-22 RX ORDER — CEFAZOLIN SODIUM/WATER 2 G/20 ML
2 SYRINGE (ML) INTRAVENOUS EVERY 8 HOURS
Status: DISCONTINUED | OUTPATIENT
Start: 2018-01-22 | End: 2018-01-22 | Stop reason: SDUPTHER

## 2018-01-22 RX ORDER — ALBUTEROL SULFATE 90 UG/1
1 AEROSOL, METERED RESPIRATORY (INHALATION)
Status: DISCONTINUED | OUTPATIENT
Start: 2018-01-22 | End: 2018-01-26 | Stop reason: HOSPADM

## 2018-01-22 RX ORDER — FLUTICASONE FUROATE AND VILANTEROL 100; 25 UG/1; UG/1
1 POWDER RESPIRATORY (INHALATION) DAILY
Status: DISCONTINUED | OUTPATIENT
Start: 2018-01-23 | End: 2018-01-26 | Stop reason: HOSPADM

## 2018-01-22 RX ORDER — DILTIAZEM HYDROCHLORIDE 180 MG/1
360 CAPSULE, COATED, EXTENDED RELEASE ORAL DAILY
Status: DISCONTINUED | OUTPATIENT
Start: 2018-01-23 | End: 2018-01-26 | Stop reason: HOSPADM

## 2018-01-22 RX ORDER — PROPOFOL 10 MG/ML
INJECTION, EMULSION INTRAVENOUS
Status: DISCONTINUED | OUTPATIENT
Start: 2018-01-22 | End: 2018-01-22 | Stop reason: HOSPADM

## 2018-01-22 RX ORDER — SODIUM CHLORIDE 0.9 % (FLUSH) 0.9 %
5-10 SYRINGE (ML) INJECTION EVERY 8 HOURS
Status: DISCONTINUED | OUTPATIENT
Start: 2018-01-22 | End: 2018-01-26 | Stop reason: HOSPADM

## 2018-01-22 RX ORDER — BUDESONIDE 0.5 MG/2ML
500 INHALANT ORAL
Status: DISCONTINUED | OUTPATIENT
Start: 2018-01-22 | End: 2018-01-22

## 2018-01-22 RX ORDER — MONTELUKAST SODIUM 10 MG/1
10 TABLET ORAL DAILY
COMMUNITY

## 2018-01-22 RX ORDER — ONDANSETRON 2 MG/ML
4 INJECTION INTRAMUSCULAR; INTRAVENOUS
Status: DISCONTINUED | OUTPATIENT
Start: 2018-01-22 | End: 2018-01-26 | Stop reason: HOSPADM

## 2018-01-22 RX ORDER — PANTOPRAZOLE SODIUM 40 MG/1
40 TABLET, DELAYED RELEASE ORAL
Status: DISCONTINUED | OUTPATIENT
Start: 2018-01-23 | End: 2018-01-26 | Stop reason: HOSPADM

## 2018-01-22 RX ORDER — LIDOCAINE HYDROCHLORIDE AND EPINEPHRINE 20; 5 MG/ML; UG/ML
20 INJECTION, SOLUTION EPIDURAL; INFILTRATION; INTRACAUDAL; PERINEURAL ONCE
Status: COMPLETED | OUTPATIENT
Start: 2018-01-22 | End: 2018-01-22

## 2018-01-22 RX ORDER — DILTIAZEM HYDROCHLORIDE 180 MG/1
360 CAPSULE, COATED, EXTENDED RELEASE ORAL DAILY
Status: DISCONTINUED | OUTPATIENT
Start: 2018-01-23 | End: 2018-01-22 | Stop reason: SDUPTHER

## 2018-01-22 RX ORDER — CEFAZOLIN SODIUM/WATER 2 G/20 ML
2 SYRINGE (ML) INTRAVENOUS
Status: COMPLETED | OUTPATIENT
Start: 2018-01-22 | End: 2018-01-22

## 2018-01-22 RX ORDER — PROPOFOL 10 MG/ML
INJECTION, EMULSION INTRAVENOUS AS NEEDED
Status: DISCONTINUED | OUTPATIENT
Start: 2018-01-22 | End: 2018-01-22 | Stop reason: HOSPADM

## 2018-01-22 RX ORDER — LIDOCAINE HYDROCHLORIDE 20 MG/ML
INJECTION, SOLUTION EPIDURAL; INFILTRATION; INTRACAUDAL; PERINEURAL AS NEEDED
Status: DISCONTINUED | OUTPATIENT
Start: 2018-01-22 | End: 2018-01-22 | Stop reason: HOSPADM

## 2018-01-22 RX ORDER — MORPHINE SULFATE 10 MG/ML
2 INJECTION, SOLUTION INTRAMUSCULAR; INTRAVENOUS
Status: DISCONTINUED | OUTPATIENT
Start: 2018-01-22 | End: 2018-01-26 | Stop reason: HOSPADM

## 2018-01-22 RX ORDER — FENTANYL CITRATE 50 UG/ML
INJECTION, SOLUTION INTRAMUSCULAR; INTRAVENOUS AS NEEDED
Status: DISCONTINUED | OUTPATIENT
Start: 2018-01-22 | End: 2018-01-22 | Stop reason: HOSPADM

## 2018-01-22 RX ORDER — ALBUTEROL SULFATE 2.5 MG/.5ML
2.5 SOLUTION RESPIRATORY (INHALATION)
Status: DISCONTINUED | OUTPATIENT
Start: 2018-01-22 | End: 2018-01-22

## 2018-01-22 RX ORDER — ALBUTEROL SULFATE 0.83 MG/ML
1.25 SOLUTION RESPIRATORY (INHALATION)
Status: DISCONTINUED | OUTPATIENT
Start: 2018-01-22 | End: 2018-01-26 | Stop reason: HOSPADM

## 2018-01-22 RX ORDER — ACETAMINOPHEN 325 MG/1
650 TABLET ORAL
Status: DISCONTINUED | OUTPATIENT
Start: 2018-01-22 | End: 2018-01-26 | Stop reason: HOSPADM

## 2018-01-22 RX ORDER — SODIUM CHLORIDE 0.9 % (FLUSH) 0.9 %
5-10 SYRINGE (ML) INJECTION AS NEEDED
Status: DISCONTINUED | OUTPATIENT
Start: 2018-01-22 | End: 2018-01-26 | Stop reason: HOSPADM

## 2018-01-22 RX ORDER — HYDROCODONE BITARTRATE AND ACETAMINOPHEN 5; 325 MG/1; MG/1
1 TABLET ORAL
Status: DISCONTINUED | OUTPATIENT
Start: 2018-01-22 | End: 2018-01-26 | Stop reason: HOSPADM

## 2018-01-22 RX ORDER — SODIUM CHLORIDE 9 MG/ML
75 INJECTION, SOLUTION INTRAVENOUS CONTINUOUS
Status: DISCONTINUED | OUTPATIENT
Start: 2018-01-22 | End: 2018-01-22 | Stop reason: HOSPADM

## 2018-01-22 RX ORDER — SODIUM CHLORIDE, SODIUM LACTATE, POTASSIUM CHLORIDE, CALCIUM CHLORIDE 600; 310; 30; 20 MG/100ML; MG/100ML; MG/100ML; MG/100ML
INJECTION, SOLUTION INTRAVENOUS
Status: DISCONTINUED | OUTPATIENT
Start: 2018-01-22 | End: 2018-01-22 | Stop reason: HOSPADM

## 2018-01-22 RX ORDER — CEFAZOLIN SODIUM/WATER 2 G/20 ML
2 SYRINGE (ML) INTRAVENOUS EVERY 8 HOURS
Status: COMPLETED | OUTPATIENT
Start: 2018-01-22 | End: 2018-01-23

## 2018-01-22 RX ADMIN — SODIUM CHLORIDE, SODIUM LACTATE, POTASSIUM CHLORIDE, CALCIUM CHLORIDE: 600; 310; 30; 20 INJECTION, SOLUTION INTRAVENOUS at 10:56

## 2018-01-22 RX ADMIN — LIDOCAINE HYDROCHLORIDE,EPINEPHRINE BITARTRATE 400 MG: 20; .005 INJECTION, SOLUTION EPIDURAL; INFILTRATION; INTRACAUDAL; PERINEURAL at 12:16

## 2018-01-22 RX ADMIN — FENTANYL CITRATE 25 MCG: 50 INJECTION, SOLUTION INTRAMUSCULAR; INTRAVENOUS at 13:05

## 2018-01-22 RX ADMIN — FENTANYL CITRATE 25 MCG: 50 INJECTION, SOLUTION INTRAMUSCULAR; INTRAVENOUS at 11:21

## 2018-01-22 RX ADMIN — IOPAMIDOL 60 ML: 755 INJECTION, SOLUTION INTRAVENOUS at 12:16

## 2018-01-22 RX ADMIN — FENTANYL CITRATE 25 MCG: 50 INJECTION, SOLUTION INTRAMUSCULAR; INTRAVENOUS at 12:28

## 2018-01-22 RX ADMIN — ALBUTEROL SULFATE 2.5 MG: 2.5 SOLUTION RESPIRATORY (INHALATION) at 15:08

## 2018-01-22 RX ADMIN — Medication 10 ML: at 22:00

## 2018-01-22 RX ADMIN — FENTANYL CITRATE 25 MCG: 50 INJECTION, SOLUTION INTRAMUSCULAR; INTRAVENOUS at 11:17

## 2018-01-22 RX ADMIN — HYDROCODONE BITARTRATE AND ACETAMINOPHEN 1 TABLET: 5; 325 TABLET ORAL at 14:19

## 2018-01-22 RX ADMIN — Medication 2 G: at 11:10

## 2018-01-22 RX ADMIN — Medication 2 G: at 18:20

## 2018-01-22 RX ADMIN — HYDROCODONE BITARTRATE AND ACETAMINOPHEN 1 TABLET: 5; 325 TABLET ORAL at 19:51

## 2018-01-22 RX ADMIN — PROPOFOL 30 MG: 10 INJECTION, EMULSION INTRAVENOUS at 11:07

## 2018-01-22 RX ADMIN — LIDOCAINE HYDROCHLORIDE 40 MG: 20 INJECTION, SOLUTION EPIDURAL; INFILTRATION; INTRACAUDAL; PERINEURAL at 11:07

## 2018-01-22 RX ADMIN — NEOMYCIN AND POLYMYXIN B SULFATES: 40; 200000 IRRIGANT IRRIGATION at 12:15

## 2018-01-22 RX ADMIN — PROPOFOL 100 MCG/KG/MIN: 10 INJECTION, EMULSION INTRAVENOUS at 11:07

## 2018-01-22 NOTE — RESEARCH NURSE
Pt. Awake alert and oriented x3. Nephew at bedside. Patient approached for the [facility] MultiPoint Pacing Study at Dr. Bailey Garcia'  request.  ICF presented and reivewed in detail and left with patient to read and consider. After thorough review of the ICF and discussion with myself and Dr. Bailey Garcia, the patient was able to verbalize understanding of the study purpose, design, risks/benefits, alternatives and costs. Patient understands that study participation is voluntary and he can withdraw from the study at any time. Patient expressed his desire to participate in the [facility] MPP study. Patient denies any questions or concerns at this time. ICF signatures were obtained prior to initiation of study procedures and a copy of the signed ICF provided to the patient and to the medical record.

## 2018-01-22 NOTE — IP AVS SNAPSHOT
[address] [address] 
[phone] Patient: Costa Caceres MRN: [id number] A check martita indicates which time of day the medication should be taken. My Medications START taking these medications Instructions Each Dose to Equal  
 Morning Noon Evening Bedtime  
 pantoprazole 40 mg tablet Commonly known as:  PROTONIX Start taking on:  1/27/2018 Replaces:  omeprazole 20 mg capsule Your last dose was: Your next dose is: Take 1 Tab by mouth Daily (before breakfast). 40 mg CHANGE how you take these medications Instructions Each Dose to Equal  
 Morning Noon Evening Bedtime  
 furosemide 40 mg tablet Commonly known as:  LASIX What changed:   
- how much to take - when to take this 
- reasons to take this 
- additional instructions Your last dose was: Your next dose is: Take 1 tablet daily or as directed  
     
   
   
   
  
 warfarin 5 mg tablet Commonly known as:  COUMADIN What changed:   
- how much to take 
- how to take this - when to take this 
- additional instructions Your last dose was: Your next dose is:    
   
   
 1 to 1 1/2 tabs every day or as directed. CONTINUE taking these medications Instructions Each Dose to Equal  
 Morning Noon Evening Bedtime  
 acetaminophen 500 mg tablet Commonly known as:  TYLENOL Your last dose was: Your next dose is: Take  by mouth every six (6) hours as needed for Pain. * albuterol 1.25 mg/3 mL Nebu Commonly known as:  Venida Gal Your last dose was: Your next dose is: 1.25 mg by Nebulization route every six (6) hours as needed. 1.25 mg  
    
   
   
   
  
 * PROAIR HFA 90 mcg/actuation inhaler Generic drug:  albuterol Your last dose was: Your next dose is: Take  by inhalation. BREO ELLIPTA 100-25 mcg/dose inhaler Generic drug:  fluticasone-vilanterol Your last dose was: Your next dose is: Take 1 Puff by inhalation daily. 1 Puff CARDIZEM  mg ER capsule Generic drug:  dilTIAZem Your last dose was: Your next dose is: Take 360 mg by mouth daily. 360 mg  
    
   
   
   
  
 guaiFENesin  mg SR tablet Commonly known as:  Johnathon & Johnathon Your last dose was: Your next dose is: Take 400 mg by mouth as needed. 400 mg  
    
   
   
   
  
 nitroglycerin 0.4 mg SL tablet Commonly known as:  NITROSTAT Your last dose was: Your next dose is:    
   
   
 1 Tab by SubLINGual route every five (5) minutes as needed for Chest Pain. 0.4 mg  
    
   
   
   
  
 simethicone 125 mg capsule Commonly known as:  GAS-X Your last dose was: Your next dose is: Take 125 mg by mouth four (4) times daily as needed for Flatulence. 125 mg  
    
   
   
   
  
 SINGULAIR 10 mg tablet Generic drug:  montelukast  
   
Your last dose was: Your next dose is: Take 10 mg by mouth daily. 10 mg STOOL SOFTENER PO Your last dose was: Your next dose is: Take  by mouth. * Notice: This list has 2 medication(s) that are the same as other medications prescribed for you. Read the directions carefully, and ask your doctor or other care provider to review them with you. STOP taking these medications   
 digoxin 0.125 mg tablet Commonly known as:  LANOXIN  
   
  
 omeprazole 20 mg capsule Commonly known as:  PRILOSEC Replaced by:  pantoprazole 40 mg tablet Where to Get Your Medications Information on where to get these meds will be given to you by the nurse or doctor. ! Ask your nurse or doctor about these medications  
  pantoprazole 40 mg tablet

## 2018-01-22 NOTE — IP AVS SNAPSHOT
[address] [facility] 
 
 
 [address] [facility] [address] 
[phone] Patient: Ewa Lindsey MRN: [id number] FYB:[birth year] About your hospitalization You were admitted on:  January 22, 2018 You last received care in the:  [facility] 3 CLINICAL OBSERVATION You were discharged on:  January 26, 2018 Why you were hospitalized Your primary diagnosis was:  Not on File Your diagnoses also included:  Cardiomyopathy (Hcc) Follow-up Information Follow up With Details Comments Contact Info Tyson Delgado MD  2-2-18 at 10:00 w device check  [address] [facility] [address] [facility] [address] 
[phone] Harper Singleton MD   [address] [facility] [address] [facility] [phone] Your Scheduled Appointments Friday February 02, 2018 10:00 AM EST  
OFFICE DEVICE CHECKS with [facility] OFFICE ([address] [place] [address]) [address]  
[address] Angelita Moise [address]  
[phone] This upcoming device check will take place IN OUR OFFICE. Please arrive 15 minutes early to review any necessary paperwork requirements. If you have any further questions or need to change this appointment, we are happy to help and can be reached at [phone]. Friday February 02, 2018 10:00 AM EST RESEARCH with [id number] [facility] OFFICE ([address] [place] [address]) [address]  
[address] Angelita [address]  
[phone] Tuesday February 06, 2018  9:00 AM EST ANTICOAG with CLELUCIANA PT  
1 [facility] ([address] [place] [address]) [id number] [place] [phone] Discharge Orders None A check martita indicates which time of day the medication should be taken. My Medications START taking these medications Instructions Each Dose to Equal  
 Morning Noon Evening Bedtime pantoprazole 40 mg tablet Commonly known as:  PROTONIX Start taking on:  1/27/2018 Replaces:  omeprazole 20 mg capsule Your last dose was: Your next dose is: Take 1 Tab by mouth Daily (before breakfast). 40 mg CHANGE how you take these medications Instructions Each Dose to Equal  
 Morning Noon Evening Bedtime  
 furosemide 40 mg tablet Commonly known as:  LASIX What changed:   
- how much to take - when to take this 
- reasons to take this 
- additional instructions Your last dose was: Your next dose is: Take 1 tablet daily or as directed  
     
   
   
   
  
 warfarin 5 mg tablet Commonly known as:  COUMADIN What changed:   
- how much to take 
- how to take this - when to take this 
- additional instructions Your last dose was: Your next dose is:    
   
   
 1 to 1 1/2 tabs every day or as directed. CONTINUE taking these medications Instructions Each Dose to Equal  
 Morning Noon Evening Bedtime  
 acetaminophen 500 mg tablet Commonly known as:  TYLENOL Your last dose was: Your next dose is: Take  by mouth every six (6) hours as needed for Pain. * albuterol 1.25 mg/3 mL Nebu Commonly known as:  Jefferson Davis Host Your last dose was: Your next dose is: 1.25 mg by Nebulization route every six (6) hours as needed. 1.25 mg  
    
   
   
   
  
 * PROAIR HFA 90 mcg/actuation inhaler Generic drug:  albuterol Your last dose was: Your next dose is: Take  by inhalation. BREO ELLIPTA 100-25 mcg/dose inhaler Generic drug:  fluticasone-vilanterol Your last dose was: Your next dose is: Take 1 Puff by inhalation daily. 1 Puff CARDIZEM  mg ER capsule Generic drug:  dilTIAZem Your last dose was: Your next dose is: Take 360 mg by mouth daily. 360 mg  
    
   
   
   
  
 guaiFENesin  mg SR tablet Commonly known as:  Johnathon & Johnathon Your last dose was: Your next dose is: Take 400 mg by mouth as needed. 400 mg  
    
   
   
   
  
 nitroglycerin 0.4 mg SL tablet Commonly known as:  NITROSTAT Your last dose was: Your next dose is:    
   
   
 1 Tab by SubLINGual route every five (5) minutes as needed for Chest Pain. 0.4 mg  
    
   
   
   
  
 simethicone 125 mg capsule Commonly known as:  GAS-X Your last dose was: Your next dose is: Take 125 mg by mouth four (4) times daily as needed for Flatulence. 125 mg  
    
   
   
   
  
 SINGULAIR 10 mg tablet Generic drug:  montelukast  
   
Your last dose was: Your next dose is: Take 10 mg by mouth daily. 10 mg STOOL SOFTENER PO Your last dose was: Your next dose is: Take  by mouth. * Notice: This list has 2 medication(s) that are the same as other medications prescribed for you. Read the directions carefully, and ask your doctor or other care provider to review them with you. STOP taking these medications   
 digoxin 0.125 mg tablet Commonly known as:  LANOXIN  
   
  
 omeprazole 20 mg capsule Commonly known as:  PRILOSEC Replaced by:  pantoprazole 40 mg tablet Where to Get Your Medications Information on where to get these meds will be given to you by the nurse or doctor. ! Ask your nurse or doctor about these medications  
  pantoprazole 40 mg tablet Discharge Instructions CARDIAC DEVICE INSTRUCTION SHEET 1.    You should have received a 1-2 weeks follow up appointment upon discharge from the hospital.  If you did not receive this appointment prior to leaving the hospital, please contact us at [phone]. 2.  Keep your incision dry for 14 days. DO NOT put ointments, creams or lotions on the incision for 2 weeks. 3.  Your dressing will be removed at your follow up appointment. If you have sutures/staples, the nurse will remove them at the follow up appointment. 4.  Call us IMMEDIATELY if you develop fever, pain, redness, or drainage at the incision site. 5.  You may use your pacemaker/ICD arm, but keep your arm lower than your shoulder for the first 8 weeks (or until cleared by a physician) to prevent the device lead(s) from moving. 6.  Do not lift more than 10 pounds for 4 weeks and 20 pounds for 8 weeks. 7.  Within 6 weeks you should receive your cardiac device ID card. Carry your card with you at all times. Always show it to any doctor or dentist you see. 8.    You will need to have your device evaluated every 3 months via office, remote, or telephone. 9.  Microwaves WILL NOT harm your device. Warnings do not apply to you. 10.  At airports, always show your cardiac device ID card. You may walk through metal detectors but do not allow the hand held wand near your device. 11.  DO NOT have an MRI without contacting your cardiologists office first.  
 
12.  Please refer to the education booklet given to you at the hospital for the activities and equipment you should avoid. Some may reprogram or interfere with your cardiac device. Atrial Fibrillation: Care Instructions Your Care Instructions Atrial fibrillation is an irregular and often fast heartbeat. Treating this condition is important for several reasons. It can cause blood clots, which can travel from your heart to your brain and cause a stroke. If you have a fast heartbeat, you may feel lightheaded, dizzy, and weak. An irregular heartbeat can also increase your risk for heart failure. Atrial fibrillation is often the result of another heart condition, such as high blood pressure or coronary artery disease. Making changes to improve your heart condition will help you stay healthy and active. Follow-up care is a key part of your treatment and safety. Be sure to make and go to all appointments, and call your doctor if you are having problems. It's also a good idea to know your test results and keep a list of the medicines you take. How can you care for yourself at home? Medicines ? · Take your medicines exactly as prescribed. Call your doctor if you think you are having a problem with your medicine. You will get more details on the specific medicines your doctor prescribes. ? · If your doctor has given you a blood thinner to prevent a stroke, be sure you get instructions about how to take your medicine safely. Blood thinners can cause serious bleeding problems. ? · Do not take any vitamins, over-the-counter drugs, or herbal products without talking to your doctor first. ? Lifestyle changes ? · Do not smoke. Smoking can increase your chance of a stroke and heart attack. If you need help quitting, talk to your doctor about stop-smoking programs and medicines. These can increase your chances of quitting for good. ? · Eat a heart-healthy diet. ? · Stay at a healthy weight. Lose weight if you need to.  
? · Limit alcohol to 2 drinks a day for men and 1 drink a day for women. Too much alcohol can cause health problems. ? · Avoid colds and flu. Get a pneumococcal vaccine shot. If you have had one before, ask your doctor whether you need another dose. Get a flu shot every year. If you must be around people with colds or flu, wash your hands often. Activity ? · If your doctor recommends it, get more exercise. Walking is a good choice. Bit by bit, increase the amount you walk every day. Try for at least 30 minutes on most days of the week.  You also may want to swim, bike, or do other activities. Your doctor may suggest that you join a cardiac rehabilitation program so that you can have help increasing your physical activity safely. ? · Start light exercise if your doctor says it is okay. Even a small amount will help you get stronger, have more energy, and manage stress. Walking is an easy way to get exercise. Start out by walking a little more than you did in the hospital. Gradually increase the amount you walk. ? · When you exercise, watch for signs that your heart is working too hard. You are pushing too hard if you cannot talk while you are exercising. If you become short of breath or dizzy or have chest pain, sit down and rest immediately. ? · Check your pulse regularly. Place two fingers on the artery at the palm side of your wrist, in line with your thumb. If your heartbeat seems uneven or fast, talk to your doctor. When should you call for help? Call 911 anytime you think you may need emergency care. For example, call if: 
? · You have symptoms of a heart attack. These may include: ¨ Chest pain or pressure, or a strange feeling in the chest. 
¨ Sweating. ¨ Shortness of breath. ¨ Nausea or vomiting. ¨ Pain, pressure, or a strange feeling in the back, neck, jaw, or upper belly or in one or both shoulders or arms. ¨ Lightheadedness or sudden weakness. ¨ A fast or irregular heartbeat. After you call 911, the  may tell you to chew 1 adult-strength or 2 to 4 low-dose aspirin. Wait for an ambulance. Do not try to drive yourself. ? · You have symptoms of a stroke. These may include: 
¨ Sudden numbness, tingling, weakness, or loss of movement in your face, arm, or leg, especially on only one side of your body. ¨ Sudden vision changes. ¨ Sudden trouble speaking. ¨ Sudden confusion or trouble understanding simple statements. ¨ Sudden problems with walking or balance. ¨ A sudden, severe headache that is different from past headaches. ? · You passed out (lost consciousness). ?Call your doctor now or seek immediate medical care if: 
? · You have new or increased shortness of breath. ? · You feel dizzy or lightheaded, or you feel like you may faint. ? · Your heart rate becomes irregular. ? · You can feel your heart flutter in your chest or skip heartbeats. Tell your doctor if these symptoms are new or worse. ? Watch closely for changes in your health, and be sure to contact your doctor if you have any problems. Where can you learn more? Go to http://mary-les.info/. Enter U020 in the search box to learn more about \"Atrial Fibrillation: Care Instructions. \" Current as of: September 21, 2016 Content Version: 11.4 © 0243-3156 [employer]. Care instructions adapted under license by [employer] (which disclaims liability or warranty for this information). If you have questions about a medical condition or this instruction, always ask your healthcare professional. [place] [address] any warranty or liability for your use of this information. [employer] Announcement We are excited to announce that we are making your provider's discharge notes available to you in [employer]. You will see these notes when they are completed and signed by the physician that discharged you from your recent hospital stay. If you have any questions or concerns about any information you see in [employer], please call the Health Information Department where you were seen or reach out to your Primary Care Provider for more information about your plan of care. Introducing [facility]! Chris Merlos introduces [employer] patient portal. Now you can access parts of your medical record, email your doctor's office, and request medication refills online. 1. In your internet browser, go to [email or web address] 2. Click on the First Time User? Click Here link in the Sign In box. You will see the New Member Sign Up page. 3. Enter your [employer] Access Code exactly as it appears below. You will not need to use this code after youve completed the sign-up process. If you do not sign up before the expiration date, you must request a new code. · [employer] Access Code: [email or web address] Expires: 4/9/2018  9:32 AM 
 
4. Enter the last four digits of your Social Security Number (xxxx) and Date of Birth (mm/dd/yyyy) as indicated and click Submit. You will be taken to the next sign-up page. 5. Create a [employer] ID. This will be your [employer] login ID and cannot be changed, so think of one that is secure and easy to remember. 6. Create a [employer] password. You can change your password at any time. 7. Enter your Password Reset Question and Answer. This can be used at a later time if you forget your password. 8. Enter your e-mail address. You will receive e-mail notification when new information is available in [address] 9. Click Sign Up. You can now view and download portions of your medical record. 10. Click the Download Summary menu link to download a portable copy of your medical information. If you have questions, please visit the Frequently Asked Questions section of the [employer] website. Remember, [employer] is NOT to be used for urgent needs. For medical emergencies, dial 911. Now available from your iPhone and Android! Providers Seen During Your Hospitalization Provider Specialty Primary office phone Alli Luna MD Cardiology [phone] Immunizations Administered for This Admission Name Date  
 TB Skin Test (PPD) Intradermal  Deferred (), 1/23/2018 Your Primary Care Physician (PCP) Primary Care Physician Office Phone Office Fax Mason Connor [phone] You are allergic to the following Allergen Reactions Levaquin (Levofloxacin) Other (comments) Prednisone Palpitations Recent Documentation Height Weight BMI Smoking Status 1.702 m 68.4 kg 23.62 kg/m2 Never Smoker Emergency Contacts Name Discharge Info Relation Home Work Mobile Seth Cornelius  Other Relative [6] [phone] Christy Azar  Other Relative [6] [phone] Lynn Irwin  Other Relative [6] [phone] Patient Belongings The following personal items are in your possession at time of discharge: 
  Dental Appliances: Uppers, Lowers, At home  Visual Aid: Magnifying glass, Glasses, At bedside      Home Medications: Sent to pharmacy   Jewelry: None  Clothing: At bedside    Other Valuables: At bedside, Cell Phone Please provide this summary of care documentation to your next provider. Signatures-by signing, you are acknowledging that this After Visit Summary has been reviewed with you and you have received a copy. Patient Signature:  ____________________________________________________________ Date:  ____________________________________________________________  
  
[facility] Provider Signature:  ____________________________________________________________ Date:  ____________________________________________________________

## 2018-01-22 NOTE — H&P
[facility] Cardiology/Electrophyiology Consult                Date of  Admission: 1/22/2018  7:53 AM     CC/Reason for admission: BiV PPM    Costa Caceres is a 76 y.o. male admitted for Atrial fibrillation, unspecified type ([facility] [address] [I48.91]. Costa Caceres is a very pleasant 76 y.o. male with a past medical and cardiac history significant for permanent afib with RVR and uncontrolled HRs, prior TIA, HTN, COPD, CKD who presents for scheduled BiV PPM. Pt reports he has had chronic intermittent problems with uncontrolled afib. He has had multiple admissions for RVR. He endorses fatigue and SEBASTIAN with minimal to moderate exertion. Pt afib is permanent, worsening, aggravated by activity, no alleviating factors, with symptoms as noted above. Cardiac PMH: (Old records have been reviewed and summarized below)    Patient Active Problem List   Diagnosis Code    Headache R51    Chest pain R07.9    Chronic renal failure N18.9    COPD (chronic obstructive pulmonary disease) (HCC) J44.9    Permanent atrial fibrillation (HCC) I48.2    TIA (transient ischemic attack) G45.9    Mitral valve regurgitation I34.0    HTN (hypertension) I10    Shortness of breath R06.02    Dyspnea R06.00    Chronic obstructive pulmonary disease (HCC) J44.9    Abdominal aortic aneurysm without rupture (HCC) I71.4       Past Medical History:   Diagnosis Date    Chronic obstructive pulmonary disease (HCC)     Dyspnea 7/21/2016    Gastrointestinal disorder     Heart failure (HCC)       Past Surgical History:   Procedure Laterality Date    HX GI      HX ORTHOPAEDIC       Allergies   Allergen Reactions    Levaquin [Levofloxacin] Other (comments)    Prednisone Palpitations      History reviewed. No pertinent family history.      Current Facility-Administered Medications   Medication Dose Route Frequency    ceFAZolin (ANCEF) 2 g/20 mL in sterile water IV syringe  2 g IntraVENous ON CALL    0.9% sodium chloride infusion  75 mL/hr IntraVENous CONTINUOUS    bacitracin 50,000 Units, neomycin-polymyxin B  2 mL in sterile water irrigation 1,000 mL irrigation   Irrigation ONCE    lidocaine-EPINEPHrine (PF) (XYLOCAINE) 2 %-1:200,000 injection 400 mg  20 mL SubCUTAneous ONCE    iopamidol (ISOVUE-370) 76 % injection 50 mL  50 mL IntraVENous RAD ONCE       Review of Symptoms:  A comprehensive ROS was performed with the pertinent positives and negatives mentioned in the HPI, all other systems reviewed and are negative       Physical Exam  Vitals:    01/22/18 0845   BP: (!) 155/98   Pulse: 70   Temp: 98.1 °F (36.7 °C)   SpO2: 100%   Weight: 71.2 kg (157 lb)   Height: 5' 7\" (1.702 m)       Physical Exam:  Gen: well appearing, well developed, NAD  Eyes: Pupils equal, EOMI  ENT: oropharynx clear, no oral lesions, normal dentition  CV: irreg irreg, tachycardic, distant S1, S2, II/VI SM, normal JVD, normal distal pulses, no CHAVA  Pulm: CTAB, no accessory muscle uses, no wheezes, crackles  GI: soft, NT, ND, +BS  Neuro: Alert and oriented  Psych: appropriate affect      Cardiographics    Telemetry:   ECG (Indpendently visualized and interpreted):  Echocardiogram:     Labs:   Recent Labs      01/22/18   0855   NA  140   K  4.4   MG  1.9   BUN  33*   CREA  1.24   GLU  98   WBC  7.1   HGB  13.2*   HCT  39.9*   PLT  238   INR  2.1        Assessment:      Active Problems:    * No active hospital problems. *         Plan:   1. Permanent atrial fibrillation, uncontrolled: Pt with permanent afib with uncontrolled HRs and NYHA class II-III symptoms and presents today for scheduled BiV PPM. Answered all questions and concerns and patient wishes to proceed. -- BiV PPM, [facility], followed by staged AVJ ablation    Miller Richards MD, MS  Cardiology/Electrophysiology

## 2018-01-22 NOTE — PROGRESS NOTES
TRANSFER - OUT REPORT:    Verbal report given to Tl Cazares RN(name) on Yasmany Lou  being transferred to telemetry(unit) for routine progression of care       Report consisted of patients Situation, Background, Assessment and   Recommendations(SBAR). Information from the following report(s) Kardex and Procedure Summary was reviewed with the receiving nurse. Lines:   Peripheral IV 01/22/18 Left Wrist (Active)        Opportunity for questions and clarification was provided.       Patient transported with:   O2 @ 0 liters   Accompanied by patient transport

## 2018-01-22 NOTE — PROGRESS NOTES
Pt arrived, ambulated to room with no visible problems, planned Bi V PPM for Dr Dillan Jamison. Consent signed, Procedure discussed with pt all questions answered voiced understanding. Medications and history discussed with pt.     Pt prepped per orders

## 2018-01-22 NOTE — PROCEDURES
Pre-Procedure Diagnosis  1. Chronic systolic heart failure, ejection fraction of 50%. 2. Non ischemic dilated cardiomyopathy. 3. Class III heart failure symptoms. 4. High degree of ventricular pacing, planned AV node ablation  5. Atrial fibrillation with uncontrolled RVR failing medical therapy    Procedure Performed  1. Insertion of St. Cristhian biventricular permanent pacemaker. 2. Insertion of left ventricular lead at time of new system Implantation. Anesthesia: MAC     Estimated Blood Loss: Less than 10 mL     Specimens: * No specimens in log *     The procedure, indications, risks, benefits, and alternatives were discussed with the patient and family members, who desired to proceed after questions were answered and informed consent was documented. Procedure: After informed consent was obtained, the patient was brought to the Electrophysiology Laboratory in a fasting state and was prepped and draped in sterile fashion. Prophylactic antibiotic was administered 10 minutes prior to skin incision (refer to anesthesia procedural documentation for details). MAC was administered by the anesthesia team with continuous oxygen saturation measurement and blood pressure measurement. Local anesthetic (sensorcaine) was delivered to the left pectoral region and an incision was made parallel to the deltopectoral groove. A subcutaneous pocket was created using blunt dissection and electrocautery, and adequate hemostasis was established. Access x 2 was obtained under ultrasound guidance using a modified Seldinger technique with placement of one short wire and a long wire down into the RA and advanced below the diaphragm. Next, placement of a peel-away sheath over the first guidewire was performed. A permanent RV pacemaker lead was then advanced under fluoroscopic guidance into the RV apex in a stable position with satisfactory sensing and pacing characteristics. The peel away sheath was removed.   The lead was sutured to the underlying pectoralis fascia using 2 non-absorbable sutures around the lead's collar. The lead slack and position was assessed under fluoroscopy while securing the lead collars to ensure proper length. After positioning the RV lead, a standard Mediasmart Marlo LV delivery sheath was advanced over the long access wire and dilator and wire were removed. The braided inner sheath was then advanced into the [id number] and used to cannulate the coronary sinus using contrast when necessary. A Glide wire and was used to allow a smooth transition into the CS body. A coronary sinus venogram was then performed using a balloon occluding catheter. A Merit renal inner sheath was used with an Acuity Straight trak to cannulate a posterolateral branch. The renal was advanced into the posterolateral and a sub selected venogram was performed. The left ventricular lead was then advanced into a posterolateral brach over an angioplasty wire. Unfortunately, thresholds were unacceptably high. The lead would not advance into the small lateral branch distally. A Merit 4Fr One Snare was used to snare the coronary wire well past 10-15 cm. The lead was then advanced over the snared wire with gentle traction. Adequate thresholds were obtained with an adequate margin between phrenic stim and loss of capture. The delivery sheaths were then slit with fluoroscopy demonstrating stable position. The lead was then sutured to the underlying pectoralis fascia using 2 non-absorbable sutures around the lead collar. Final lead testing via the pacing system analyzer (PSA) demonstrated stable sensing, impedance and pacing thresholds. The lead pins were then cleaned with antibiotic soaked gauze, dried gently, and attached to a new biventricular pacemaker generator. Pins were directly observed to pass the tip electrode, and the ring hex wrench screws were secured, and leads tug tested.   The pocket was irrigated copiously with a saline antimicrobial solution. The device and leads were gently positioned within the pocket. The wound was closed with multiple layers of absorbable suture followed by skin closure with staples. Fluoroscopic images were interpreted by me, in multiple projections. Final device position was confirmed by stored fluoroscopic image from device pocket to lead tips in AP projection. The patient tolerated the procedure well and left the lab in good condition. Lead Data:    Device and Lead Information  Pulse Generator Model #  Serial # Location Implant   [id number] [employer] [id number] Left Pectoral Implant   Lead Model Number  Serial Number Lead position Implant   RV [id number]/58 [employer] [facility] [id number] RV [place] Implant   LV 1458Q/86 [employer] [facility] [id number] CS Implant     Lead Sensitivity and Threshold  Lead R or P sensitivity (mv) Threshold (V) Threshold PW (msec) Impedance (ohms) Final output Voltage (V) Final PW (msec)   RV 12.0 0.75 0.40 650 2.50 0.40   LV - 1.25 0.50 1050 2.50 0.50     Bradycardia Settings  Ken Mode LRL URL Pace AVD (ms) Sense AVD (ms) Rate Response Mode Switching Mode SW Rate   VVI 60 120 - - On - -     Contrast:  60 ml    Fluoro Time:   61.4 minutes    Complications: None    IMPRESSION: Successful implantation of St. Cristhian biventricular permanent pacemaker    Miller Richards MD, MS  Clinical Cardiac Electrophysiology  1/22/2018  1:22 PM

## 2018-01-22 NOTE — ANESTHESIA POSTPROCEDURE EVALUATION
Post-Anesthesia Evaluation and Assessment    Patient: Lorna Coats MRN: [id number]  SSN: [id number]    YOB: 1940  Age: 76 y.o. Sex: male       Cardiovascular Function/Vital Signs  Visit Vitals    /88    Pulse (!) 107    Temp 36.4 °C (97.6 °F)    Resp 19    Ht 5' 7\" (1.702 m)    Wt 71.2 kg (157 lb)    SpO2 99%    BMI 24.59 kg/m2       Patient is status post total IV anesthesia anesthesia for Procedure(s):  BIV PACEMAKER . Nausea/Vomiting: None    Postoperative hydration reviewed and adequate. Pain:  Pain Scale 1: Numeric (0 - 10) (01/22/18 1405)  Pain Intensity 1: 6 (01/22/18 1405)   Managed    Neurological Status:   Neuro (WDL): Within Defined Limits (01/22/18 1336)   At baseline    Mental Status and Level of Consciousness: Arousable    Pulmonary Status:   O2 Device: Nasal cannula (01/22/18 1336)   Adequate oxygenation and airway patent    Complications related to anesthesia: None    Post-anesthesia assessment completed.  No concerns    Signed By: Darrell Berrios MD     January 22, 2018

## 2018-01-22 NOTE — ANESTHESIA PREPROCEDURE EVALUATION
Anesthetic History               Review of Systems / Medical History  Patient summary reviewed, nursing notes reviewed and pertinent labs reviewed    Pulmonary    COPD: moderate               Neuro/Psych         TIA     Cardiovascular    Hypertension: well controlled  Valvular problems/murmurs: mitral insufficiency      Dysrhythmias (Permanent a-fib w frequent RVR)       Exercise tolerance: <4 METS     GI/Hepatic/Renal                Endo/Other             Other Findings              Physical Exam    Airway  Mallampati: II  TM Distance: > 6 cm  Neck ROM: decreased range of motion   Mouth opening: Normal     Cardiovascular    Rhythm: irregular  Rate: abnormal         Dental    Dentition: Full lower dentures and Full upper dentures     Pulmonary  Breath sounds clear to auscultation               Abdominal  GI exam deferred       Other Findings            Anesthetic Plan    ASA: 3  Anesthesia type: total IV anesthesia            Anesthetic plan and risks discussed with: Patient    Nephew present

## 2018-01-23 PROCEDURE — 99218 HC RM OBSERVATION: CPT

## 2018-01-23 PROCEDURE — 97161 PT EVAL LOW COMPLEX 20 MIN: CPT

## 2018-01-23 PROCEDURE — 74011000302 HC RX REV CODE- 302: Performed by: NURSE PRACTITIONER

## 2018-01-23 PROCEDURE — 97162 PT EVAL MOD COMPLEX 30 MIN: CPT

## 2018-01-23 PROCEDURE — 74011250637 HC RX REV CODE- 250/637: Performed by: INTERNAL MEDICINE

## 2018-01-23 PROCEDURE — G8979 MOBILITY GOAL STATUS: HCPCS

## 2018-01-23 PROCEDURE — G8987 SELF CARE CURRENT STATUS: HCPCS

## 2018-01-23 PROCEDURE — 97166 OT EVAL MOD COMPLEX 45 MIN: CPT

## 2018-01-23 PROCEDURE — 97530 THERAPEUTIC ACTIVITIES: CPT

## 2018-01-23 PROCEDURE — 74011250636 HC RX REV CODE- 250/636: Performed by: INTERNAL MEDICINE

## 2018-01-23 PROCEDURE — 86580 TB INTRADERMAL TEST: CPT | Performed by: NURSE PRACTITIONER

## 2018-01-23 PROCEDURE — G8988 SELF CARE GOAL STATUS: HCPCS

## 2018-01-23 PROCEDURE — G8978 MOBILITY CURRENT STATUS: HCPCS

## 2018-01-23 RX ADMIN — Medication 10 ML: at 05:23

## 2018-01-23 RX ADMIN — TUBERCULIN PURIFIED PROTEIN DERIVATIVE 5 UNITS: 5 INJECTION, SOLUTION INTRADERMAL at 08:43

## 2018-01-23 RX ADMIN — HYDROCODONE BITARTRATE AND ACETAMINOPHEN 1 TABLET: 5; 325 TABLET ORAL at 08:55

## 2018-01-23 RX ADMIN — Medication 10 ML: at 14:00

## 2018-01-23 RX ADMIN — Medication 2 G: at 05:50

## 2018-01-23 RX ADMIN — PANTOPRAZOLE SODIUM 40 MG: 40 TABLET, DELAYED RELEASE ORAL at 08:42

## 2018-01-23 RX ADMIN — HYDROCODONE BITARTRATE AND ACETAMINOPHEN 1 TABLET: 5; 325 TABLET ORAL at 15:22

## 2018-01-23 RX ADMIN — Medication 5 ML: at 21:31

## 2018-01-23 RX ADMIN — HYDROCODONE BITARTRATE AND ACETAMINOPHEN 1 TABLET: 5; 325 TABLET ORAL at 01:17

## 2018-01-23 RX ADMIN — DILTIAZEM HYDROCHLORIDE 360 MG: 180 CAPSULE, COATED, EXTENDED RELEASE ORAL at 08:42

## 2018-01-23 NOTE — PROGRESS NOTES
Problem: Mobility Impaired (Adult and Pediatric)  Goal: *Acute Goals and Plan of Care (Insert Text)  LTG:  (1.)Mr. Epifanio Son will move from supine to sit and sit to supine  in bed with MODIFIED INDEPENDENCE within 7 day(s). (2.)Mr. Epifanio Son will transfer from bed to chair and chair to bed with MODIFIED INDEPENDENCE using the least restrictive device within 7 day(s). (3.)Mr. Epifanio Son will ambulate with SUPERVISION for 150 feet with the least restrictive device within 7 day(s). ________________________________________________________________________________________________      PHYSICAL THERAPY: Initial Assessment 1/23/2018  OBSERVATION: Hospital Day: 2  Payor: Christina Alfonso / Plan: [facility] HUMANA MEDICARE CHOICE PPO/PFFS / Product Type: Digital Guardian Care Medicare /      NAME/AGE/GENDER: Jennifer Gonzalez is a 76 y.o. male   PRIMARY DIAGNOSIS: Atrial fibrillation, unspecified type ([facility] [I48.91] <principal problem not specified> <principal problem not specified>  Procedure(s) (LRB):  BIV PACEMAKER  (N/A)  1 Day Post-Op  ICD-10: Treatment Diagnosis:   · Generalized Muscle Weakness (M62.81)  · Other lack of cordination (R27.8)  · Difficulty in walking, Not elsewhere classified (R26.2)   Precaution/Allergies:  Levaquin [levofloxacin] and Prednisone      ASSESSMENT:     Mr. Epifanio Son presents sitting in bedside chair with sling donned on L UE. agreeable to therapy. Patient transferred sit>stand with PT providing MIN A and SPC in R UE. Patient ambulated with SPC and MIN A from PT 40 ft with short/shuffled steps. Patient returned sitting in bedside chair while PT prepared bed for SPT to bed. Patient performed SPT with HHA x1 with MIN A from PT to sit on edge of bed, and then required MIN A with transfer from sit>supine. Patient left with PCT at bedside with all needs in reach.  Mr. Epifanio Son will benefit from continued skilled therapy to increase independence with functional transfers and household mobility as well as increase activity tolerance. This section established at most recent assessment   PROBLEM LIST (Impairments causing functional limitations):  1. Decreased Strength  2. Decreased ADL/Functional Activities  3. Decreased Transfer Abilities  4. Decreased Ambulation Ability/Technique  5. Decreased Balance  6. Decreased Activity Tolerance  7. Increased Fatigue  8. Increased Shortness of Breath   INTERVENTIONS PLANNED: (Benefits and precautions of physical therapy have been discussed with the patient.)  1. Balance Exercise  2. Bed Mobility  3. Gait Training  4. Therapeutic Activites  5. Therapeutic Exercise/Strengthening  6. Group Therapy     TREATMENT PLAN: Frequency/Duration: 3 times a week for duration of hospital stay  Rehabilitation Potential For Stated Goals: Good     RECOMMENDED REHABILITATION/EQUIPMENT: (at time of discharge pending progress): Due to the probability of continued deficits (see above) this patient will likely need continued skilled physical therapy after discharge. Equipment:    Walkers, Type: Rollator- secondary to increased fatigue with ambulation. HISTORY:   History of Present Injury/Illness (Reason for Referral):  Nawaf Olivier is a 76 y.o. male admitted for Atrial fibrillation, unspecified type ([facility] [I48.91].    Marija Monroes a very pleasant 76 y. o. male with a past medical and cardiac history significant for permanent afib with RVR and uncontrolled HRs, prior TIA, HTN, COPD, CKD who presents for scheduled BiV PPM. Pt reports he has had chronic intermittent problems with uncontrolled afib. He has had multiple admissions for RVR. He endorses fatigue and SEBASTIAN with minimal to moderate exertion. Pt afib is permanent, worsening, aggravated by activity, no alleviating factors, with symptoms as noted above.      Past Medical History/Comorbidities:   Mr. Hanh Fernandez  has a past medical history of Chronic obstructive pulmonary disease ([facility] Dyspnea (7/21/2016);  Gastrointestinal disorder; and Heart failure ([facility] Mr. Christiano Hamlin  has a past surgical history that includes hx orthopaedic and hx gi. Social History/Living Environment:   Home Environment: Trailer/mobile home  Wheelchair Ramp: Yes  One/Two Story Residence: One story  Living Alone: No  Support Systems: Family member(s)  Patient Expects to be Discharged to[de-identified] Trailer/mobile home  Current DME Used/Available at Home: Sonya Roy, suzanna, Cane, straight  Prior Level of Function/Work/Activity:  Patient ambulated community distances with [facility]. Patient ambulated up/down ramp with SPC for ease of access to home environment. Patient required assistance with self care and meal prep. Number of Personal Factors/Comorbidities that affect the Plan of Care: 0: LOW COMPLEXITY   EXAMINATION:   Most Recent Physical Functioning:   Gross Assessment:  AROM: Generally decreased, functional  PROM: Within functional limits  Strength: Generally decreased, functional               Posture:  Posture (WDL): Exceptions to WDL  Posture Assessment: Forward head, Kyphosis  Balance:  Standing: Impaired  Standing - Static: Fair;Constant support  Standing - Dynamic : Poor Bed Mobility:  Sit to Supine: Minimum assistance  Wheelchair Mobility:     Transfers:  Sit to Stand: Minimum assistance  Stand to Sit: Contact guard assistance  Gait:     Base of Support: Narrowed  Step Length: Left shortened;Right shortened  Distance (ft): 30 Feet (ft)  Assistive Device: Cane, straight  Ambulation - Level of Assistance: Minimal assistance   Functional Mobility:         Gait/Ambulation:  Patient ambulated with short shuffled steps with narrowed ENRIQUE and use of SPC in R UE. Body Structures Involved:  1. Heart Body Functions Affected:  1. Cardio Activities and Participation Affected:  1. Mobility  2.  Self Care   Number of elements that affect the Plan of Care: 3: MODERATE COMPLEXITY   CLINICAL PRESENTATION:   Presentation: Evolving clinical presentation with changing clinical characteristics: MODERATE COMPLEXITY   CLINICAL DECISION MAKIN[address] [place] [id number] AM-PAC 6 Clicks   Basic Mobility Inpatient Short Form  How much difficulty does the patient currently have. .. Unable A Lot A Little None   1. Turning over in bed (including adjusting bedclothes, sheets and blankets)? [] 1   [] 2   [x] 3   [] 4   2. Sitting down on and standing up from a chair with arms ( e.g., wheelchair, bedside commode, etc.)   [] 1   [] 2   [x] 3   [] 4   3. Moving from lying on back to sitting on the side of the bed? [] 1   [] 2   [x] 3   [] 4   How much help from another person does the patient currently need. .. Total A Lot A Little None   4. Moving to and from a bed to a chair (including a wheelchair)? [] 1   [] 2   [x] 3   [] 4   5. Need to walk in hospital room? [] 1   [] 2   [x] 3   [] 4   6. Climbing 3-5 steps with a railing? [] 1   [x] 2   [] 3   [] 4   © 2007, Trustees of [address] [place], [place] [id number], under license to [employer]. All rights reserved      Score:  Initial: 17 Most Recent: X (Date: -- )    Interpretation of Tool:  Represents activities that are increasingly more difficult (i.e. Bed mobility, Transfers, Gait). Score 24 23 22-20 19-15 14-10 9-7 6     Modifier CH CI CJ CK CL CM CN      ? Mobility - Walking and Moving Around:     - CURRENT STATUS: CK - 40%-59% impaired, limited or restricted    - GOAL STATUS: CJ - 20%-39% impaired, limited or restricted    - D/C STATUS:  ---------------To be determined---------------  Payor: HUMANA MEDICARE / Plan: [facility] HUMANA MEDICARE CHOICE PPO/PFFS / Product Type: Managed Care Medicare /      Medical Necessity:     · Patient is expected to demonstrate progress in strength to decrease assistance required with household ambulation. Reason for Services/Other Comments:  · Patient continues to require skilled intervention due to decreased endurance and independence with functional mobility. .   Use of outcome tool(s) and clinical judgement create a POC that gives a: Questionable prediction of patient's progress: MODERATE COMPLEXITY            TREATMENT:   (In addition to Assessment/Re-Assessment sessions the following treatments were rendered)   Pre-treatment Symptoms/Complaints:  None  Pain: Initial:   Pain Intensity 1: 8  Pain Location 1: Head  Post Session:    Pain intensity 1: 8  Pain location 1: Head     Assessment/Reassessment only, no treatment provided today    Braces/Orthotics/Lines/Etc:   · O2 Device: Room air  Treatment/Session Assessment:    · Response to Treatment:  Evaluation only. · Interdisciplinary Collaboration:   o Physical Therapist  o Registered Nurse  o Certified Nursing Assistant/Patient Care Technician  · After treatment position/precautions:   o Supine in bed  o Bed/Chair-wheels locked  o Bed in low position  o Call light within reach   · Compliance with Program/Exercises: Will assess as treatment progresses. · Recommendations/Intent for next treatment session: \"Next visit will focus on advancements to more challenging activities\".   Total Treatment Duration:  PT Patient Time In/Time Out  Time In: 2053  Time Out: 4535 [facility]

## 2018-01-23 NOTE — PROGRESS NOTES
Problem: Falls - Risk of  Goal: *Absence of Falls  Document Heather Fall Risk and appropriate interventions in the flowsheet.    Outcome: Progressing Towards Goal  Fall Risk Interventions:  Mobility Interventions: Assess mobility with egress test, Bed/chair exit alarm, Patient to call before getting OOB         Medication Interventions: Patient to call before getting OOB, Teach patient to arise slowly    Elimination Interventions: Bed/chair exit alarm, Call light in reach, Patient to call for help with toileting needs, Urinal in reach, Toileting schedule/hourly rounds

## 2018-01-23 NOTE — PROGRESS NOTES
Problem: Pacer/ICD: Day 1  Goal: Activity/Safety  Outcome: Progressing Towards Goal  Pt feeling weak.   Needing more time out of bed and feels like he needs rehab

## 2018-01-23 NOTE — PROGRESS NOTES
Patient resting quietly in bed. Slept at short intervals throughout the shift, no distress noted during hourly bedside checks. No request or needs at present. Medicated twice this shift for c/o headache. Denies pain at present.

## 2018-01-23 NOTE — PROGRESS NOTES
Bedside and Verbal shift change report given to self (oncoming nurse) by Vance Gaitan RN (offgoing nurse). Report included the following information Kardex, Procedure Summary, Intake/Output, MAR and Cardiac Rhythm A-Fib. Tuyet Gibson

## 2018-01-23 NOTE — PROGRESS NOTES
CM met with patient to review the Medicare Outpatient Observation Notice with him. CM reviewed the letter with him and he was accepting of it. He signed it. I gave a copy to him and I put a copy in his chart. Patient told me he wants to go to a nursing home. I am going to send his packet referrals to [address] [place] [address],[address] and [place] [id number]. Patient alert and oriented. He has 7 children. He walks with a cane. He requires assistance with his dressing and bathing. Patient lives with niece and nephew. They assist with transporting patient to his Dr appointments. CM to assist with SNF Rehab placement.

## 2018-01-23 NOTE — PROGRESS NOTES
Patient is interested in [facility] [employer]. [facility] CM following.

## 2018-01-23 NOTE — PROGRESS NOTES
[facility] CARDIOLOGY PROGRESS NOTE           1/23/2018 7:26 AM    Admit Date: 1/22/2018    Admit Diagnosis: Atrial fibrillation, unspecified type ([address] [I48.91]      Subjective:   Patients A. Fib rates have been stable. He reports that he is too weak to go home and has no one to help him. Objective:     Vitals:    01/22/18 1930 01/22/18 2113 01/23/18 0112 01/23/18 0516   BP:  109/67 (!) 132/93 120/69   Pulse:  (!) 56 82 67   Resp:  18 17 18   Temp:  98.2 °F (36.8 °C) 98 °F (36.7 °C) 98 °F (36.7 °C)   SpO2: 98% 98% 98% 98%   Weight:       Height:           Physical Exam:  General-Well Developed, Well Nourished, No Acute Distress, Alert & Oriented x 3, appropriate mood. Neck- supple, no JVD  CV- irregular rate and rhythm no MRG  Lung- clear bilaterally  Abd- soft, nontender, nondistended  Ext- no edema bilaterally.   Skin- warm and dry    Current Facility-Administered Medications   Medication Dose Route Frequency    albuterol (PROVENTIL VENTOLIN) nebulizer solution 1.25 mg (Patient Supplied)  1.25 mg Nebulization Q6H PRN    albuterol (PROVENTIL HFA, VENTOLIN HFA, PROAIR HFA) inhaler 1 Puff  1 Puff Inhalation Q6H PRN    pantoprazole (PROTONIX) tablet 40 mg  40 mg Oral ACB    sodium chloride (NS) flush 5-10 mL  5-10 mL IntraVENous Q8H    sodium chloride (NS) flush 5-10 mL  5-10 mL IntraVENous PRN    acetaminophen (TYLENOL) tablet 650 mg  650 mg Oral Q4H PRN    HYDROcodone-acetaminophen (NORCO) 5-325 mg per tablet 1 Tab  1 Tab Oral Q4H PRN    morphine injection 2 mg  2 mg IntraVENous Q4H PRN    ondansetron (ZOFRAN) injection 4 mg  4 mg IntraVENous Q4H PRN    fluticasone-vilanterol (BREO ELLIPTA) 100mcg-25mcg/puff (Patient Supplied)  1 Puff Inhalation DAILY    dilTIAZem (TIAZAC) capsule 360 mg (Patient Supplied)  360 mg Oral DAILY     Data Review:   Recent Results (from the past 24 hour(s))   CBC W/O DIFF    Collection Time: 01/22/18  8:55 AM   Result Value Ref Range    WBC 7.1 4.3 - 11.1 K/uL    RBC 4.53 4.23 - 5.67 M/uL    HGB 13.2 (L) 13.6 - 17.2 g/dL    HCT 39.9 (L) 41.1 - 50.3 %    MCV 88.1 79.6 - 97.8 FL    MCH 29.1 26.1 - 32.9 PG    MCHC 33.1 31.4 - 35.0 g/dL    RDW 13.9 11.9 - 14.6 %    PLATELET 974 354 - 664 K/uL    MPV 9.1 (L) 10.8 - 36.7 FL   METABOLIC PANEL, COMPREHENSIVE    Collection Time: 01/22/18  8:55 AM   Result Value Ref Range    Sodium 140 136 - 145 mmol/L    Potassium 4.4 3.5 - 5.1 mmol/L    Chloride 107 98 - 107 mmol/L    CO2 19 (L) 21 - 32 mmol/L    Anion gap 14 7 - 16 mmol/L    Glucose 98 65 - 100 mg/dL    BUN 33 (H) 8 - 23 MG/DL    Creatinine 1.24 0.8 - 1.5 MG/DL    GFR est AA >60 >60 ml/min/1.73m2    GFR est non-AA 60 (L) >60 ml/min/1.73m2    Calcium 9.4 8.3 - 10.4 MG/DL    Bilirubin, total 0.4 0.2 - 1.1 MG/DL    ALT (SGPT) 14 12 - 65 U/L    AST (SGOT) 18 15 - 37 U/L    Alk.  phosphatase 94 50 - 136 U/L    Protein, total 7.9 6.3 - 8.2 g/dL    Albumin 3.5 3.2 - 4.6 g/dL    Globulin 4.4 (H) 2.3 - 3.5 g/dL    A-G Ratio 0.8 (L) 1.2 - 3.5     PROTHROMBIN TIME + INR    Collection Time: 01/22/18  8:55 AM   Result Value Ref Range    Prothrombin time 23.1 (H) 11.5 - 14.5 sec    INR 2.1     MAGNESIUM    Collection Time: 01/22/18  8:55 AM   Result Value Ref Range    Magnesium 1.9 1.8 - 2.4 mg/dL   EKG, 12 LEAD, INITIAL    Collection Time: 01/22/18  9:01 AM   Result Value Ref Range    Ventricular Rate 120 BPM    Atrial Rate 119 BPM    QRS Duration 110 ms    Q-T Interval 320 ms    QTC Calculation (Bezet) 452 ms    Calculated R Axis -43 degrees    Calculated T Axis 96 degrees    Diagnosis       Atrial fibrillation with rapid ventricular response  Left axis deviation  Incomplete right bundle branch block  Voltage criteria for left ventricular hypertrophy  Abnormal QRS-T angle, consider primary T wave abnormality  Abnormal ECG  When compared with ECG of 17-AUG-2017 15:55,  T wave inversion no longer evident in Inferior leads  T wave inversion less evident in Lateral leads  Confirmed by Paty Chao MD (), Link Parra ([id number]) on 1/22/2018 9:41:41 AM     EKG, 12 LEAD, INITIAL    Collection Time: 01/22/18  2:11 PM   Result Value Ref Range    Ventricular Rate 114 BPM    Atrial Rate 108 BPM    QRS Duration 104 ms    Q-T Interval 312 ms    QTC Calculation (Bezet) 430 ms    Calculated R Axis -46 degrees    Calculated T Axis 58 degrees    Diagnosis       Atrial fibrillation with rapid ventricular response  Left axis deviation  Moderate voltage criteria for LVH, may be normal variant  Nonspecific ST and T wave abnormality , probably digitalis effect  Abnormal ECG  When compared with ECG of 22-JAN-2018 09:01,  Nonspecific T wave abnormality, worse in Lateral leads  Confirmed by Paty Chao MD (), Link Parra ([id number]) on 1/22/2018 4:02:55 PM       Assessment:     Active Problems:    Cardiomyopathy ([address] (1/22/2018)      Plan:   1. A. Fib - Planned staged AV node ablation. Stable rates overnight. 2. Discharge planning - Consult SW for placement issues    Lilian Urrutia. Tanika Rodriguez M.D., F.A.C.C, F.H.R.S.   Cardiology/Electrophysiology

## 2018-01-23 NOTE — PROGRESS NOTES
Problem: Self Care Deficits Care Plan (Adult)  Goal: *Acute Goals and Plan of Care (Insert Text)  1. Patient will complete self-feeding and self-grooming tasks with set-up to improve independence for ADL  2. Patient will complete toileting with minimal assistance. 3. Patient will tolerate 25 minutes of OT treatment with 2-3 rest breaks to increase activity tolerance for ADLs. 4. Patient will complete functional transfers with supervision and with good knowledge to not press through L UE.  5. Patient will participate in total body bathing task with minimal assistance to improve independence with self-care. 6. Patient will complete functional mobility with supervision for household distances with good safety to decrease risk for falls. Timeframe: 7 visits       OCCUPATIONAL THERAPY: Initial Assessment and PM 1/23/2018  OBSERVATION: Hospital Day: 2  Payor: Octavia Moon / Plan: [id number] PPO/PFFS / Product Type: [employer] Medicare /      NAME/AGE/GENDER: Costa Caecres is a 76 y.o. male   PRIMARY DIAGNOSIS:  Atrial fibrillation, unspecified type ([facility] [I48.91] <principal problem not specified> <principal problem not specified>  Procedure(s) (LRB):  BIV PACEMAKER  (N/A)  1 Day Post-Op  ICD-10: Treatment Diagnosis:    · Generalized Muscle Weakness (M62.81)  · Other lack of cordination (R27.8)   Precautions/Allergies:     Levaquin [levofloxacin] and Prednisone      ASSESSMENT:     Mr. Jose A Mendez presents to the hospital with a-fib and is s/p placement of pacemaker with sling to L UE. Pt was alert upon arrival and c/o 10/10 headache (nursing notified). Pt still agreeable to participating in OT evaluation. Pt needed additional time to transfer to the edge of the bed due to decreased use of L UE and minimal assistance. Pt demonstrates functional strength in R UE. Pt does report numbness/tingling in B hands. Pt stood to the edge of the bed with minimal assistance and use of his cane in R hand.  Pt completed functional mobility in the room with minimal assistance with pt demonstrating small shuffling steps. Pt reports minor dizziness. Pt states he hasn't had any falls at home, but reports near falls with pt catching himself on walls. Pt returned back to supine after treatment. Pt is currently limited with ADL and functional transfers due to decreased activity tolerance, balance, and decreased functional use of L UE currently for functional transfers. Pt states he lives at home with niece/nephew, but niece unable to provide physical assistance. Nurse at bedside attending to pt at end of session. Pt is currently functioning below baseline for ADL/functional transfers and will benefit from OT services to address stated goals and plan of care. This section established at most recent assessment   PROBLEM LIST (Impairments causing functional limitations):  1. Decreased Strength  2. Decreased ADL/Functional Activities  3. Decreased Transfer Abilities  4. Decreased Ambulation Ability/Technique  5. Decreased Balance  6. Increased Pain  7. Decreased Activity Tolerance  8. Increased Shortness of Breath  9. Decreased Flexibility/Joint Mobility  10. Decreased Knowledge of Precautions  11. Decreased Cottle with Home Exercise Program   INTERVENTIONS PLANNED: (Benefits and precautions of occupational therapy have been discussed with the patient.)  1. Activities of daily living training  2. Adaptive equipment training  3. Balance training  4. Clothing management  5. Donning&doffing training  6. Neuromuscular re-eduation  7. Therapeutic activity  8. Therapeutic exercise     TREATMENT PLAN: Frequency/Duration: Follow patient 3 time per week to address above goals.   Rehabilitation Potential For Stated Goals: Excellent     RECOMMENDED REHABILITATION/EQUIPMENT: (at time of discharge pending progress): Due to the probability of continued deficits (see above) this patient will likely need continued skilled occupational therapy after discharge. Equipment:    TBD              OCCUPATIONAL PROFILE AND HISTORY:   History of Present Injury/Illness (Reason for Referral):  See H&P  Past Medical History/Comorbidities:   Mr. Tiff Gray  has a past medical history of Chronic obstructive pulmonary disease ([facility] [address] Dyspnea (7/21/2016); Gastrointestinal disorder; and Heart failure ([facility] [address] Mr. Tiff Gray  has a past surgical history that includes hx orthopaedic and hx gi. Social History/Living Environment:   Home Environment: Trailer/mobile home  Wheelchair Ramp: Yes  One/Two Story Residence: One story  Living Alone: No  Support Systems: Family member(s)  Patient Expects to be Discharged to[de-identified] Trailer/mobile home  Current DME Used/Available at Home: Walker, rolling, Cane, straight  Tub or Shower Type: Tub/Shower combination   Prior Level of Function/Work/Activity:  Pt lives at home with his niece and nephew. Pt states nephew works occasionally and niece is disabled and unable to provide physical assistance. Pt was completing functional mobility with cane. Pt was completing most ADL without assistance but needing additional time and reports difficulty. Pt unable to step over tub/shower and stand for long period of time, therefore he was completing sponge bath. Personal Factors:          Social Background:  Lives with niece who is unable to assist pt physically as needed        Past/Current Experience:  Severe headache, arm in sling with decreased use for functional transfers        Other factors that influence how disability is experienced by the patient: COPD   Number of Personal Factors/Comorbidities that affect the Plan of Care: Extensive review of physical, cognitive, and psychosocial performance (3+):  HIGH COMPLEXITY   ASSESSMENT OF OCCUPATIONAL PERFORMANCE[de-identified]   Activities of Daily Living:           Basic ADLs (From Assessment) Complex ADLs (From Assessment)   Basic ADL  Feeding: Stand-by assistance  Oral Facial Hygiene/Grooming:  Moderate assistance  Bathing: Moderate assistance  Upper Body Dressing: Moderate assistance  Lower Body Dressing: Maximum assistance  Toileting: Moderate assistance Instrumental ADL  Meal Preparation: Maximum assistance  Homemaking: Total assistance   Grooming/Bathing/Dressing Activities of Daily Living     Cognitive Retraining  Safety/Judgement: Fall prevention;Home safety                 Functional Transfers  Toilet Transfer : Moderate assistance  Tub Transfer: Maximum assistance  Shower Transfer: Moderate assistance     Bed/Mat Mobility  Supine to Sit: Minimum assistance; Additional time  Sit to Supine: Minimum assistance; Additional time  Sit to Stand: Minimum assistance  Bed to Chair: Minimum assistance  Scooting: Minimum assistance; Additional time       Most Recent Physical Functioning:   Gross Assessment:  AROM: Generally decreased, functional (B UE; L UE in sling with recent pacemaker placement)  Strength: Generally decreased, functional (B UE)  Coordination: Generally decreased, functional (B UE)  Sensation: Impaired (B hands)               Posture:  Posture (WDL): Exceptions to WDL  Posture Assessment: Forward head, Kyphosis  Balance:  Standing: Impaired  Standing - Static: Fair  Standing - Dynamic : Poor Bed Mobility:  Supine to Sit: Minimum assistance; Additional time  Sit to Supine: Minimum assistance; Additional time  Scooting: Minimum assistance; Additional time  Wheelchair Mobility:     Transfers:  Sit to Stand: Minimum assistance  Stand to Sit: Minimum assistance  Bed to Chair: Minimum assistance            Patient Vitals for the past 6 hrs:   BP BP Patient Position SpO2 Pulse   01/23/18 1300 - - - (!) 111   01/23/18 1323 146/82 At rest;Supine 98 % 91       Mental Status  Neurologic State: Alert  Orientation Level: Oriented X4  Cognition: Appropriate for age attention/concentration, Follows commands  Perception: Appears intact  Perseveration: No perseveration noted  Safety/Judgement: Fall prevention, Home safety                          Physical Skills Involved:  1. Range of Motion  2. Balance  3. Strength  4. Activity Tolerance  5. Sensation  6. Pain (acute) Cognitive Skills Affected (resulting in the inability to perform in a timely and safe manner):  1. Executive Function Psychosocial Skills Affected:  1. Habits/Routines  2. Self-Awareness   Number of elements that affect the Plan of Care: 5+:  HIGH COMPLEXITY   CLINICAL DECISION MAKIN[address] [place] [address] AM-PAC 6 Clicks   Daily Activity Inpatient Short Form  How much help from another person does the patient currently need. .. Total A Lot A Little None   1. Putting on and taking off regular lower body clothing? [] 1   [x] 2   [] 3   [] 4   2. Bathing (including washing, rinsing, drying)? [] 1   [x] 2   [] 3   [] 4   3. Toileting, which includes using toilet, bedpan or urinal?   [] 1   [x] 2   [] 3   [] 4   4. Putting on and taking off regular upper body clothing? [] 1   [x] 2   [] 3   [] 4   5. Taking care of personal grooming such as brushing teeth? [] 1   [x] 2   [] 3   [] 4   6. Eating meals? [] 1   [] 2   [x] 3   [] 4   © , Trustees of [address] [place] [address], under license to [employer]. All rights reserved      Score:  Initial: 13 Most Recent: X (Date: -- )    Interpretation of Tool:  Represents activities that are increasingly more difficult (i.e. Bed mobility, Transfers, Gait). Score 24 23 22-20 19-15 14-10 9-7 6     Modifier CH CI CJ CK CL CM CN      ? Self Care:     - CURRENT STATUS: CL - 60%-79% impaired, limited or restricted    - GOAL STATUS: CK - 40%-59% impaired, limited or restricted    - D/C STATUS:  ---------------To be determined---------------  Payor: [employer] MEDICARE / Plan: [facility] [employer] MEDICARE CHOICE PPO/PFFS / Product Type: Managed Care Medicare /      Medical Necessity:     · Patient demonstrates excellent rehab potential due to higher previous functional level.   Reason for Services/Other Comments:  · Patient continues to require skilled intervention due to decreased independence with ADL/functional transfers. Use of outcome tool(s) and clinical judgement create a POC that gives a: MODERATE COMPLEXITY         TREATMENT:   (In addition to Assessment/Re-Assessment sessions the following treatments were rendered)     Pre-treatment Symptoms/Complaints:    Pain: Initial:   Pain Intensity 1: 10  Pain Location 1: Head  Pain Orientation 1: Anterior, Right  Pain Intervention(s) 1: Nurse notified  Post Session:  same     Assessment/Reassessment only, no treatment provided today    Braces/Orthotics/Lines/Etc:   · sling to L UE  · O2 Device: Room air  Treatment/Session Assessment:    · Response to Treatment:  Evaluation only. · Interdisciplinary Collaboration:   o Occupational Therapist  o Registered Nurse  · After treatment position/precautions:   o Supine in bed  o Bed/Chair-wheels locked  o Call light within reach  o RN notified  o Nurse at bedside   · Compliance with Program/Exercises: Will assess as treatment progresses. · Recommendations/Intent for next treatment session: \"Next visit will focus on advancements to more challenging activities and reduction in assistance provided\".   Total Treatment Duration:  OT Patient Time In/Time Out  Time In: 1428  Time Out: 1330 [facility],

## 2018-01-24 LAB
MM INDURATION POC: 0 MM (ref 0–5)
PPD POC: NEGATIVE NEGATIVE

## 2018-01-24 PROCEDURE — 74011250637 HC RX REV CODE- 250/637: Performed by: INTERNAL MEDICINE

## 2018-01-24 PROCEDURE — 99218 HC RM OBSERVATION: CPT

## 2018-01-24 RX ADMIN — Medication 10 ML: at 13:06

## 2018-01-24 RX ADMIN — PANTOPRAZOLE SODIUM 40 MG: 40 TABLET, DELAYED RELEASE ORAL at 08:11

## 2018-01-24 RX ADMIN — Medication 10 ML: at 08:17

## 2018-01-24 RX ADMIN — FLUTICASONE FUROATE AND VILANTEROL 1 PUFF: 100; 25 POWDER RESPIRATORY (INHALATION) at 08:11

## 2018-01-24 RX ADMIN — HYDROCODONE BITARTRATE AND ACETAMINOPHEN 1 TABLET: 5; 325 TABLET ORAL at 10:27

## 2018-01-24 RX ADMIN — Medication 5 ML: at 21:29

## 2018-01-24 RX ADMIN — DILTIAZEM HYDROCHLORIDE 360 MG: 180 CAPSULE, COATED, EXTENDED RELEASE ORAL at 08:49

## 2018-01-24 NOTE — PHYSICIAN ADVISORY
Letter of Determination:  Outpatient states receiving Observation Services    This case was reviewed, and I concur with Outpatient status receiving observation services. This determination may change depending on further medical information, condition changes of the patient, or treatment requirements.       Price Boucher MD, STAN,   Physician [place].

## 2018-01-24 NOTE — PROGRESS NOTES
Spiritual Care Visit, Initial visit,    Patient visited by Wilma Velasco. Entered by Chanda Arias M.Ed., Th.B., Staff .

## 2018-01-24 NOTE — PROGRESS NOTES
[facility] CARDIOLOGY PROGRESS NOTE           1/24/2018 7:26 AM    Admit Date: 1/22/2018    Admit Diagnosis: Atrial fibrillation, unspecified type ([address] [I48.91]      Subjective:   Patients A. Fib rates have been stable. Case management is working on placement. Objective:     Vitals:    01/24/18 0127 01/24/18 0545 01/24/18 0917 01/24/18 1320   BP: 125/76 130/81 128/82 123/70   Pulse: 86 84 98 92   Resp: 18 18 17 18   Temp: 97.7 °F (36.5 °C) 97.9 °F (36.6 °C) 96.7 °F (35.9 °C) 96.5 °F (35.8 °C)   SpO2: 99% 98% 100% 100%   Weight:  150 lb 14.4 oz (68.4 kg)     Height:           Physical Exam:  General-Well Developed, Well Nourished, No Acute Distress, Alert & Oriented x 3, appropriate mood. Neck- supple, no JVD  CV- irregular rate and rhythm no MRG  Lung- clear bilaterally  Abd- soft, nontender, nondistended  Ext- no edema bilaterally.   Skin- warm and dry    Current Facility-Administered Medications   Medication Dose Route Frequency    albuterol (PROVENTIL VENTOLIN) nebulizer solution 1.25 mg (Patient Supplied)  1.25 mg Nebulization Q6H PRN    albuterol (PROVENTIL HFA, VENTOLIN HFA, PROAIR HFA) inhaler 1 Puff  1 Puff Inhalation Q6H PRN    pantoprazole (PROTONIX) tablet 40 mg  40 mg Oral ACB    sodium chloride (NS) flush 5-10 mL  5-10 mL IntraVENous Q8H    sodium chloride (NS) flush 5-10 mL  5-10 mL IntraVENous PRN    acetaminophen (TYLENOL) tablet 650 mg  650 mg Oral Q4H PRN    HYDROcodone-acetaminophen (NORCO) 5-325 mg per tablet 1 Tab  1 Tab Oral Q4H PRN    morphine injection 2 mg  2 mg IntraVENous Q4H PRN    ondansetron (ZOFRAN) injection 4 mg  4 mg IntraVENous Q4H PRN    fluticasone-vilanterol (BREO ELLIPTA) 100mcg-25mcg/puff (Patient Supplied)  1 Puff Inhalation DAILY    dilTIAZem CD (CARDIZEM CD) capsule 360 mg  360 mg Oral DAILY     Data Review:   Recent Results (from the past 24 hour(s))   PLEASE READ & DOCUMENT PPD TEST IN 24 HRS    Collection Time: 01/24/18  8:12 AM   Result Value Ref Range    PPD negative Negative    mm Induration 0 mm     Assessment:     Active Problems:    Cardiomyopathy ([address] (1/22/2018)      Plan:   1. A. Fib - Planned staged AV node ablation. Stable rates. 2. Discharge planning -  is working on placement issues    Sharon Thapa. Yumiko Jenkins M.D., F.A.C.C, F.H.R.S.   Cardiology/Electrophysiology

## 2018-01-24 NOTE — PROGRESS NOTES
Verbal bedside report given to Tj Kapoor oncoming RN. Patient's situation, background, assessment and recommendations provided. Opportunity for questions provided. Oncoming RN assumed care of patient.

## 2018-01-24 NOTE — PROGRESS NOTES
Problem: Falls - Risk of  Goal: *Absence of Falls  Document Heather Fall Risk and appropriate interventions in the flowsheet.    Fall Risk Interventions:  Mobility Interventions: Bed/chair exit alarm, Communicate number of staff needed for ambulation/transfer, Patient to call before getting OOB         Medication Interventions: Patient to call before getting OOB, Teach patient to arise slowly    Elimination Interventions: Bed/chair exit alarm, Call light in reach, Patient to call for help with toileting needs, Urinal in reach

## 2018-01-24 NOTE — PROGRESS NOTES
Verbal bedside report received from Elena Murphy RN. Assumed care of patient.   Pt awake and without any needs at this time

## 2018-01-24 NOTE — PROGRESS NOTES
[facility] do not have rehab beds. Called [facility] and they are full for [address], and sent referrals to [facility] and [facility] through [facility]. Patient ready for d/c but CM unable to find SNF willing to accept patient due to no beds. Will continue to follow and hopefully start pre-cert with [facility].

## 2018-01-25 LAB
ANION GAP SERPL CALC-SCNC: 11 MMOL/L (ref 7–16)
BUN SERPL-MCNC: 33 MG/DL (ref 8–23)
CALCIUM SERPL-MCNC: 8.6 MG/DL (ref 8.3–10.4)
CHLORIDE SERPL-SCNC: 106 MMOL/L (ref 98–107)
CO2 SERPL-SCNC: 21 MMOL/L (ref 21–32)
CREAT SERPL-MCNC: 1.04 MG/DL (ref 0.8–1.5)
ERYTHROCYTE [DISTWIDTH] IN BLOOD BY AUTOMATED COUNT: 13.5 % (ref 11.9–14.6)
GLUCOSE SERPL-MCNC: 124 MG/DL (ref 65–100)
HCT VFR BLD AUTO: 34.3 % (ref 41.1–50.3)
HGB BLD-MCNC: 11.3 G/DL (ref 13.6–17.2)
INR PPP: 1.4
MCH RBC QN AUTO: 28.8 PG (ref 26.1–32.9)
MCHC RBC AUTO-ENTMCNC: 32.9 G/DL (ref 31.4–35)
MCV RBC AUTO: 87.3 FL (ref 79.6–97.8)
MM INDURATION POC: NORMAL MM (ref 0–5)
PLATELET # BLD AUTO: 180 K/UL (ref 150–450)
PMV BLD AUTO: 9.1 FL (ref 10.8–14.1)
POTASSIUM SERPL-SCNC: 4.7 MMOL/L (ref 3.5–5.1)
PPD POC: NORMAL NEGATIVE
PROTHROMBIN TIME: 16.9 SEC (ref 11.5–14.5)
RBC # BLD AUTO: 3.93 M/UL (ref 4.23–5.67)
SODIUM SERPL-SCNC: 138 MMOL/L (ref 136–145)
WBC # BLD AUTO: 7.8 K/UL (ref 4.3–11.1)

## 2018-01-25 PROCEDURE — 36415 COLL VENOUS BLD VENIPUNCTURE: CPT | Performed by: NURSE PRACTITIONER

## 2018-01-25 PROCEDURE — 74011250637 HC RX REV CODE- 250/637: Performed by: INTERNAL MEDICINE

## 2018-01-25 PROCEDURE — 85027 COMPLETE CBC AUTOMATED: CPT | Performed by: NURSE PRACTITIONER

## 2018-01-25 PROCEDURE — 85610 PROTHROMBIN TIME: CPT | Performed by: NURSE PRACTITIONER

## 2018-01-25 PROCEDURE — 80048 BASIC METABOLIC PNL TOTAL CA: CPT | Performed by: NURSE PRACTITIONER

## 2018-01-25 PROCEDURE — 74011250637 HC RX REV CODE- 250/637: Performed by: NURSE PRACTITIONER

## 2018-01-25 PROCEDURE — 99218 HC RM OBSERVATION: CPT

## 2018-01-25 RX ORDER — WARFARIN SODIUM 5 MG/1
5 TABLET ORAL
Status: DISCONTINUED | OUTPATIENT
Start: 2018-01-25 | End: 2018-01-26 | Stop reason: HOSPADM

## 2018-01-25 RX ORDER — WARFARIN 2.5 MG/1
2.5 TABLET ORAL
Status: DISCONTINUED | OUTPATIENT
Start: 2018-01-26 | End: 2018-01-26 | Stop reason: HOSPADM

## 2018-01-25 RX ADMIN — WARFARIN SODIUM 5 MG: 5 TABLET ORAL at 17:46

## 2018-01-25 RX ADMIN — DILTIAZEM HYDROCHLORIDE 360 MG: 180 CAPSULE, COATED, EXTENDED RELEASE ORAL at 08:18

## 2018-01-25 RX ADMIN — Medication 5 ML: at 21:18

## 2018-01-25 RX ADMIN — HYDROCODONE BITARTRATE AND ACETAMINOPHEN 1 TABLET: 5; 325 TABLET ORAL at 08:18

## 2018-01-25 RX ADMIN — Medication 5 ML: at 14:00

## 2018-01-25 RX ADMIN — HYDROCODONE BITARTRATE AND ACETAMINOPHEN 1 TABLET: 5; 325 TABLET ORAL at 21:19

## 2018-01-25 RX ADMIN — Medication 5 ML: at 06:00

## 2018-01-25 RX ADMIN — PANTOPRAZOLE SODIUM 40 MG: 40 TABLET, DELAYED RELEASE ORAL at 08:18

## 2018-01-25 NOTE — PROGRESS NOTES
Problem: Nutrition Deficit  Goal: *Optimize nutritional status  Nutrition:  Malnutrition Screening Tool Referral for 2-13 pound weight loss without trying and eating poorly due to decreased appetite. Assessment:  Anthropometrics:  Ht - 5'7\", wgt - 68.4 kg (standing scale 3rd floor SFD 1/25/18), BMI - 23.6 c/w \"healthy weight\" for a person > 72years of age, edema - none reported. Macronutrient Needs:  Estimated calorie needs - 6471-1160 calvin/day (22-25 calvin/kg/day)   Estimated protein needs - 75-82 gm pro/day (1.1-1.2 gm pro/kgIBW/day)   Intake/Comparative Standards: This patient's average intake of Cardiac diet for the past 4 days/8 meals: 59%. This potentially meets 77% of calorie and 68% of protein goals. Diet:   Cardiac. Food/Nutrition History:   76year old gentleman with a h/o MVR and COPD admitted with cardiomyopathy. Weight history from his cardiology visits over the last 6 months indicate a 21 pound weight loss reflective of a 12% weight loss clinically significant. He indicates that he has developed GERD and he reports that he has \"cut back\" his intake to minimize his symptoms. Diagnosis (Nutrition): Inadequate oral intake related to decreased ability to consume sufficient oral intake as evidenced by weight loss over the last year. Intervention:  Meals and Snacks: Cardiac. A copy of the menu was given to the patient to assist him in choosing his meals to optimize his oral intake. Medical Food Supplement Therapy: Strawberry ice cream with dinner daily. Medical Food Supplement Therapy: Commercial Beverage: Butter pecan Ensure Plus (350 calories, 13 gm protein) with lunch and dinner daily. Nutrition Discharge Plan: He would benefit from the consumption of the equivalent of 1 supplement daily after discharge to promote stable weight. Elaine Ryan.  [facility]  [phone]

## 2018-01-25 NOTE — PROGRESS NOTES
Verbal bedside report given to rfaat Mcnally RN. Patient's situation, background, assessment and recommendations provided. Opportunity for questions provided. Oncoming RN assumed care of patient.

## 2018-01-25 NOTE — PROGRESS NOTES
[facility] CARDIOLOGY PROGRESS NOTE           1/25/2018 7:26 AM    Admit Date: 1/22/2018    Admit Diagnosis: Atrial fibrillation, unspecified type ([address] [I48.91]      Subjective:   Patients A. Fib rates have been stable. Case management is working on placement. Objective:     Vitals:    01/24/18 2129 01/25/18 0120 01/25/18 0533 01/25/18 0915   BP: (!) 128/94 142/76 126/82 122/73   Pulse: 96 74 69 89   Resp: 18 18 18 16   Temp: 97.7 °F (36.5 °C) 97.9 °F (36.6 °C) 97.7 °F (36.5 °C) 98.2 °F (36.8 °C)   SpO2: 99% 100% 99% 99%   Weight:   150 lb 12.8 oz (68.4 kg)    Height:           Physical Exam:  General-Well Developed, Well Nourished, No Acute Distress, Alert & Oriented x 3, appropriate mood. Neck- supple, no JVD  CV- irregular rate and rhythm no MRG  Lung- clear bilaterally  Abd- soft, nontender, nondistended  Ext- no edema bilaterally.   Skin- warm and dry    Current Facility-Administered Medications   Medication Dose Route Frequency    [START ON 1/26/2018] warfarin (COUMADIN) tablet 2.5 mg  2.5 mg Oral Once per day on Mon Fri    warfarin (COUMADIN) tablet 5 mg  5 mg Oral Once per day on Sun Tue Wed Thu Sat    albuterol (PROVENTIL VENTOLIN) nebulizer solution 1.25 mg (Patient Supplied)  1.25 mg Nebulization Q6H PRN    albuterol (PROVENTIL HFA, VENTOLIN HFA, PROAIR HFA) inhaler 1 Puff  1 Puff Inhalation Q6H PRN    pantoprazole (PROTONIX) tablet 40 mg  40 mg Oral ACB    sodium chloride (NS) flush 5-10 mL  5-10 mL IntraVENous Q8H    sodium chloride (NS) flush 5-10 mL  5-10 mL IntraVENous PRN    acetaminophen (TYLENOL) tablet 650 mg  650 mg Oral Q4H PRN    HYDROcodone-acetaminophen (NORCO) 5-325 mg per tablet 1 Tab  1 Tab Oral Q4H PRN    morphine injection 2 mg  2 mg IntraVENous Q4H PRN    ondansetron (ZOFRAN) injection 4 mg  4 mg IntraVENous Q4H PRN    fluticasone-vilanterol (BREO ELLIPTA) 100mcg-25mcg/puff (Patient Supplied)  1 Puff Inhalation DAILY    dilTIAZem CD (CARDIZEM CD) capsule 360 mg  360 mg Oral DAILY     Data Review:   Recent Results (from the past 24 hour(s))   CBC W/O DIFF    Collection Time: 01/25/18  3:46 AM   Result Value Ref Range    WBC 7.8 4.3 - 11.1 K/uL    RBC 3.93 (L) 4.23 - 5.67 M/uL    HGB 11.3 (L) 13.6 - 17.2 g/dL    HCT 34.3 (L) 41.1 - 50.3 %    MCV 87.3 79.6 - 97.8 FL    MCH 28.8 26.1 - 32.9 PG    MCHC 32.9 31.4 - 35.0 g/dL    RDW 13.5 11.9 - 14.6 %    PLATELET 876 296 - 312 K/uL    MPV 9.1 (L) 10.8 - 85.3 FL   METABOLIC PANEL, BASIC    Collection Time: 01/25/18  3:46 AM   Result Value Ref Range    Sodium 138 136 - 145 mmol/L    Potassium 4.7 3.5 - 5.1 mmol/L    Chloride 106 98 - 107 mmol/L    CO2 21 21 - 32 mmol/L    Anion gap 11 7 - 16 mmol/L    Glucose 124 (H) 65 - 100 mg/dL    BUN 33 (H) 8 - 23 MG/DL    Creatinine 1.04 0.8 - 1.5 MG/DL    GFR est AA >60 >60 ml/min/1.73m2    GFR est non-AA >60 >60 ml/min/1.73m2    Calcium 8.6 8.3 - 10.4 MG/DL   PROTHROMBIN TIME + INR    Collection Time: 01/25/18  3:46 AM   Result Value Ref Range    Prothrombin time 16.9 (H) 11.5 - 14.5 sec    INR 1.4     PLEASE READ & DOCUMENT PPD TEST IN 48 HRS    Collection Time: 01/25/18  8:20 AM   Result Value Ref Range    PPD  Negative    mm Induration  mm     Assessment:     Active Problems:    Cardiomyopathy ([address] (1/22/2018)      Plan:   1. A. Fib - Planned staged AV node ablation. Stable rates. 2. Discharge planning -  is working on placement issues. Rehab on Friday    Kori Quiñonez. Emma Christianson M.D., F.A.C.C, F.H.R.S.   Cardiology/Electrophysiology

## 2018-01-25 NOTE — PROGRESS NOTES
Bedside and Verbal shift change report given to self (oncoming nurse) by Darion Hurd (offgoing nurse). Report included the following information SBAR, Kardex, MAR and Recent Results.

## 2018-01-25 NOTE — PROGRESS NOTES
Verbal bedside report given to oncoming RN, Corinne Hodge. Patient's situation, background, assessment and recommendations provided. Opportunity for questions provided. Oncoming RN assumed care of patient.

## 2018-01-25 NOTE — PROGRESS NOTES
Bedside and Verbal shift change report given to self (oncoming nurse) by Cyndi Chang (offgoing nurse). Report included the following information SBAR, Kardex, MAR and Recent Results.

## 2018-01-26 VITALS
OXYGEN SATURATION: 100 % | SYSTOLIC BLOOD PRESSURE: 128 MMHG | BODY MASS INDEX: 23.67 KG/M2 | RESPIRATION RATE: 16 BRPM | WEIGHT: 150.8 LBS | HEART RATE: 79 BPM | DIASTOLIC BLOOD PRESSURE: 62 MMHG | HEIGHT: 67 IN | TEMPERATURE: 97.7 F

## 2018-01-26 LAB
INR PPP: 1.3
MM INDURATION POC: 0 MM (ref 0–5)
PPD POC: NEGATIVE NEGATIVE
PROTHROMBIN TIME: 15.4 SEC (ref 11.5–14.5)

## 2018-01-26 PROCEDURE — 36415 COLL VENOUS BLD VENIPUNCTURE: CPT | Performed by: NURSE PRACTITIONER

## 2018-01-26 PROCEDURE — G8988 SELF CARE GOAL STATUS: HCPCS

## 2018-01-26 PROCEDURE — 97535 SELF CARE MNGMENT TRAINING: CPT

## 2018-01-26 PROCEDURE — 74011250637 HC RX REV CODE- 250/637: Performed by: INTERNAL MEDICINE

## 2018-01-26 PROCEDURE — G8989 SELF CARE D/C STATUS: HCPCS

## 2018-01-26 PROCEDURE — 99218 HC RM OBSERVATION: CPT

## 2018-01-26 PROCEDURE — 85610 PROTHROMBIN TIME: CPT | Performed by: NURSE PRACTITIONER

## 2018-01-26 RX ORDER — PANTOPRAZOLE SODIUM 40 MG/1
40 TABLET, DELAYED RELEASE ORAL
Qty: 30 TAB | Refills: 1 | Status: SHIPPED | OUTPATIENT
Start: 2018-01-27 | End: 2018-02-06

## 2018-01-26 RX ADMIN — PANTOPRAZOLE SODIUM 40 MG: 40 TABLET, DELAYED RELEASE ORAL at 08:26

## 2018-01-26 RX ADMIN — HYDROCODONE BITARTRATE AND ACETAMINOPHEN 1 TABLET: 5; 325 TABLET ORAL at 13:09

## 2018-01-26 RX ADMIN — DILTIAZEM HYDROCHLORIDE 360 MG: 180 CAPSULE, COATED, EXTENDED RELEASE ORAL at 08:26

## 2018-01-26 RX ADMIN — Medication 5 ML: at 05:41

## 2018-01-26 RX ADMIN — Medication 5 ML: at 13:12

## 2018-01-26 NOTE — DISCHARGE INSTRUCTIONS
CARDIAC DEVICE INSTRUCTION SHEET    1. You should have received a 1-2 weeks follow up appointment upon discharge from the hospital.  If you did not receive this appointment prior to leaving the hospital, please contact us at [phone]. 2.  Keep your incision dry for 14 days. DO NOT put ointments, creams or lotions on the incision for 2 weeks. 3.  Your dressing will be removed at your follow up appointment. If you have sutures/staples, the nurse will remove them at the follow up appointment. 4.  Call us IMMEDIATELY if you develop fever, pain, redness, or drainage at the incision site. 5.  You may use your pacemaker/ICD arm, but keep your arm lower than your shoulder for the first 8 weeks (or until cleared by a physician) to prevent the device lead(s) from moving. 6.  Do not lift more than 10 pounds for 4 weeks and 20 pounds for 8 weeks. 7.  Within 6 weeks you should receive your cardiac device ID card. Carry your card with you at all times. Always show it to any doctor or dentist you see. 8.    You will need to have your device evaluated every 3 months via office, remote, or telephone. 9.  Microwaves WILL NOT harm your device. Warnings do not apply to you. 10.  At airports, always show your cardiac device ID card. You may walk through metal detectors but do not allow the hand held wand near your device. 11.  DO NOT have an MRI without contacting your cardiologists office first.     12.  Please refer to the education booklet given to you at the hospital for the activities and equipment you should avoid. Some may reprogram or interfere with your cardiac device. Atrial Fibrillation: Care Instructions  Your Care Instructions    Atrial fibrillation is an irregular and often fast heartbeat. Treating this condition is important for several reasons. It can cause blood clots, which can travel from your heart to your brain and cause a stroke.  If you have a fast heartbeat, you may feel lightheaded, dizzy, and weak. An irregular heartbeat can also increase your risk for heart failure. Atrial fibrillation is often the result of another heart condition, such as high blood pressure or coronary artery disease. Making changes to improve your heart condition will help you stay healthy and active. Follow-up care is a key part of your treatment and safety. Be sure to make and go to all appointments, and call your doctor if you are having problems. It's also a good idea to know your test results and keep a list of the medicines you take. How can you care for yourself at home? Medicines  ? · Take your medicines exactly as prescribed. Call your doctor if you think you are having a problem with your medicine. You will get more details on the specific medicines your doctor prescribes. ? · If your doctor has given you a blood thinner to prevent a stroke, be sure you get instructions about how to take your medicine safely. Blood thinners can cause serious bleeding problems. ? · Do not take any vitamins, over-the-counter drugs, or herbal products without talking to your doctor first.   ? Lifestyle changes  ? · Do not smoke. Smoking can increase your chance of a stroke and heart attack. If you need help quitting, talk to your doctor about stop-smoking programs and medicines. These can increase your chances of quitting for good. ? · Eat a heart-healthy diet. ? · Stay at a healthy weight. Lose weight if you need to.   ? · Limit alcohol to 2 drinks a day for men and 1 drink a day for women. Too much alcohol can cause health problems. ? · Avoid colds and flu. Get a pneumococcal vaccine shot. If you have had one before, ask your doctor whether you need another dose. Get a flu shot every year. If you must be around people with colds or flu, wash your hands often. Activity  ? · If your doctor recommends it, get more exercise. Walking is a good choice.  Bit by bit, increase the amount you walk every day. Try for at least 30 minutes on most days of the week. You also may want to swim, bike, or do other activities. Your doctor may suggest that you join a cardiac rehabilitation program so that you can have help increasing your physical activity safely. ? · Start light exercise if your doctor says it is okay. Even a small amount will help you get stronger, have more energy, and manage stress. Walking is an easy way to get exercise. Start out by walking a little more than you did in the hospital. Gradually increase the amount you walk. ? · When you exercise, watch for signs that your heart is working too hard. You are pushing too hard if you cannot talk while you are exercising. If you become short of breath or dizzy or have chest pain, sit down and rest immediately. ? · Check your pulse regularly. Place two fingers on the artery at the palm side of your wrist, in line with your thumb. If your heartbeat seems uneven or fast, talk to your doctor. When should you call for help? Call 911 anytime you think you may need emergency care. For example, call if:  ? · You have symptoms of a heart attack. These may include:  ¨ Chest pain or pressure, or a strange feeling in the chest.  ¨ Sweating. ¨ Shortness of breath. ¨ Nausea or vomiting. ¨ Pain, pressure, or a strange feeling in the back, neck, jaw, or upper belly or in one or both shoulders or arms. ¨ Lightheadedness or sudden weakness. ¨ A fast or irregular heartbeat. After you call 911, the  may tell you to chew 1 adult-strength or 2 to 4 low-dose aspirin. Wait for an ambulance. Do not try to drive yourself. ? · You have symptoms of a stroke. These may include:  ¨ Sudden numbness, tingling, weakness, or loss of movement in your face, arm, or leg, especially on only one side of your body. ¨ Sudden vision changes. ¨ Sudden trouble speaking. ¨ Sudden confusion or trouble understanding simple statements.   ¨ Sudden problems with walking or balance. ¨ A sudden, severe headache that is different from past headaches. ? · You passed out (lost consciousness). ?Call your doctor now or seek immediate medical care if:  ? · You have new or increased shortness of breath. ? · You feel dizzy or lightheaded, or you feel like you may faint. ? · Your heart rate becomes irregular. ? · You can feel your heart flutter in your chest or skip heartbeats. Tell your doctor if these symptoms are new or worse. ? Watch closely for changes in your health, and be sure to contact your doctor if you have any problems. Where can you learn more? Go to http://mary-les.info/. Enter U020 in the search box to learn more about \"Atrial Fibrillation: Care Instructions. \"  Current as of: September 21, 2016  Content Version: 11.4  © 5336-0922 [employer]. Care instructions adapted under license by [employer] (which disclaims liability or warranty for this information). If you have questions about a medical condition or this instruction, always ask your healthcare professional. [address] any warranty or liability for your use of this information.

## 2018-01-26 NOTE — PROGRESS NOTES
Care Management Interventions  PCP Verified by CM: Yes  Mode of Transport at Discharge: BLS  Transition of Care Consult (CM Consult): SNF  Partner SNF: No  Reason Why Partner SNF Not Chosen: Bed availability  Physical Therapy Consult: Yes  Occupational Therapy Consult: Yes  Plan discussed with Pt/Family/Caregiver: Yes  Freedom of Choice Offered: Yes  Discharge Location  Discharge Placement: Skilled nursing facility   Patient to be d/c to [facility] rehab as [facility] [place] has given approval. Notified patient of this information and transport set up for 330 pm today. Packet sent with [facility] ambulance with face sheet, H/P, D/C summary, Nursing home orders, PASSAR, PPD, PT/OT notes, and MAR.  Discharged to [facility]

## 2018-01-26 NOTE — PROGRESS NOTES
Problem: Falls - Risk of  Goal: *Absence of Falls  Document Heather Fall Risk and appropriate interventions in the flowsheet.    Outcome: Progressing Towards Goal  Fall Risk Interventions:  Mobility Interventions: PT Consult for mobility concerns         Medication Interventions: Teach patient to arise slowly    Elimination Interventions: Call light in reach, Urinal in reach

## 2018-01-26 NOTE — PROGRESS NOTES
Spoke with Radha Robin this morning at 11 and still no pre-cert and she called [facility] and they state it is still under review. Noted no PT/OT notes since 1/23 so called earlier to request them see patient so if [facility] wants updates then can provide the notes.  CM following

## 2018-01-26 NOTE — PROGRESS NOTES
TRANSFER - OUT REPORT:    Verbal report given to Victorino Lake RN on Rise Boy being transferred to [facility] [place] room 308 for routine progression of care       Report consisted of patients Situation, Background, Assessment and Recommendations (SBAR). Information from the following report(s) SBAR, Kardex, ED Summary, MAR, Recent Results, Med Rec Status and Cardiac Rhythm A. fib was reviewed with the receiving nurse. Opportunity for questions and clarification was provided. Patient waiting transport to facility.

## 2018-01-26 NOTE — PROGRESS NOTES
[facility] CARDIOLOGY PROGRESS NOTE           1/26/2018 7:14 AM    Admit Date: 1/22/2018    Admit Diagnosis: Atrial fibrillation, unspecified type ([address] [I48.91]      Subjective:   No complaints this AM, no chest pain or shortness of breath, appropriate post op device pocket pain    Interval History: (History of pertinent interval events obtained from nursing staff)  Cardiac device placed this admission, no events overnight, no immediate complications    ROS:  GEN:  No fever or chills  Cardiovascular:  As noted above  Pulmonary:  As noted above  Neuro:  No new focal motor or sensory loss      Objective:     Vitals:    01/25/18 1707 01/25/18 2103 01/26/18 0130 01/26/18 0553   BP: 131/83 (!) 144/96 (!) 135/93 131/76   Pulse: 82 91 82 78   Resp: 14 18 18 18   Temp: 98.7 °F (37.1 °C) 98.1 °F (36.7 °C) 97.9 °F (36.6 °C) 97.5 °F (36.4 °C)   SpO2: 98% 99% 99% 100%   Weight:    68.4 kg (150 lb 12.8 oz)   Height:           Physical Exam:  General-Well Developed, Well Nourished, No Acute Distress, Alert & Oriented x 3, appropriate mood. CV- irreg irreg, no MRG, left infraclavicular pocket with dressing in place, no evidence of hematoma, redness, warmth or excessive drainage  Lung- clear bilaterally  Abd- soft, nontender, nondistended  Ext- no edema bilaterally.     Current Facility-Administered Medications   Medication Dose Route Frequency    warfarin (COUMADIN) tablet 2.5 mg  2.5 mg Oral Once per day on Mon Fri    warfarin (COUMADIN) tablet 5 mg  5 mg Oral Once per day on Sun Tue Wed Thu Sat    albuterol (PROVENTIL VENTOLIN) nebulizer solution 1.25 mg (Patient Supplied)  1.25 mg Nebulization Q6H PRN    albuterol (PROVENTIL HFA, VENTOLIN HFA, PROAIR HFA) inhaler 1 Puff  1 Puff Inhalation Q6H PRN    pantoprazole (PROTONIX) tablet 40 mg  40 mg Oral ACB    sodium chloride (NS) flush 5-10 mL  5-10 mL IntraVENous Q8H    sodium chloride (NS) flush 5-10 mL  5-10 mL IntraVENous PRN    acetaminophen (TYLENOL) tablet 650 mg  650 mg Oral Q4H PRN    HYDROcodone-acetaminophen (NORCO) 5-325 mg per tablet 1 Tab  1 Tab Oral Q4H PRN    morphine injection 2 mg  2 mg IntraVENous Q4H PRN    ondansetron (ZOFRAN) injection 4 mg  4 mg IntraVENous Q4H PRN    fluticasone-vilanterol (BREO ELLIPTA) 100mcg-25mcg/puff (Patient Supplied)  1 Puff Inhalation DAILY    dilTIAZem CD (CARDIZEM CD) capsule 360 mg  360 mg Oral DAILY     Data Review:   Recent Results (from the past 24 hour(s))   PLEASE READ & DOCUMENT PPD TEST IN 48 HRS    Collection Time: 01/25/18  8:20 AM   Result Value Ref Range    PPD  Negative    mm Induration  mm   PROTHROMBIN TIME + INR    Collection Time: 01/26/18  3:35 AM   Result Value Ref Range    Prothrombin time 15.4 (H) 11.5 - 14.5 sec    INR 1.3         EKG:  (EKG has been independently visualized by me with interpretation below)  Assessment:     Active Problems:    Cardiomyopathy ([address] (1/22/2018)      Plan:   1. Cardiac device implantation: Pt device interrogation post implant reveals normal function, excellent pacing and sensing parameters, CXR without pneumothorax with excellent device position, no recognized complications, awaiting placement. 2. Afib: continue rate control, planned AV node ablation, warfarin restarted post procedure  3. Dispo: possible placement today    Miller Richards MD  Cardiology/Electrophysiology

## 2018-01-26 NOTE — PROGRESS NOTES
Verbal bedside report given to oncoming RNVj. Patient's situation, background, assessment and recommendations provided. Opportunity for questions provided. Oncoming RN assumed care of patient.

## 2018-01-26 NOTE — PROGRESS NOTES
Problem: Self Care Deficits Care Plan (Adult)  Goal: *Acute Goals and Plan of Care (Insert Text)  1. Patient will complete self-feeding and self-grooming tasks with set-up to improve independence for ADL  2. Patient will complete toileting with minimal assistance. 3. Patient will tolerate 25 minutes of OT treatment with 2-3 rest breaks to increase activity tolerance for ADLs. 4. Patient will complete functional transfers with supervision and with good knowledge to not press through L UE.  5. Patient will participate in total body bathing task with minimal assistance to improve independence with self-care. 6. Patient will complete functional mobility with supervision for household distances with good safety to decrease risk for falls. Timeframe: 7 visits       OCCUPATIONAL THERAPY: Daily Note and PM 1/26/2018  OBSERVATION: Hospital Day: 5  Payor: Taya Hartmann / Plan: Jayant Blanchard PPO/PFFS / Product Type: [employer] Medicare /      NAME/AGE/GENDER: Kirill Wheatley is a 76 y.o. male   PRIMARY DIAGNOSIS:  Atrial fibrillation, unspecified type ([facility] [I48.91] <principal problem not specified> <principal problem not specified>  Procedure(s) (LRB):  BIV PACEMAKER  (N/A)  4 Days Post-Op  ICD-10: Treatment Diagnosis:    · Generalized Muscle Weakness (M62.81)  · Other lack of cordination (R27.8)   Precautions/Allergies:     Levaquin [levofloxacin] and Prednisone      ASSESSMENT:     Mr. Brook Garza presents to the hospital with a-fib and is s/p placement of pacemaker with sling to L UE. Patient feeding himself lunch with set up assist upon entrance sitting on edge of bed. Patient stood with minimal assistance and ambulated to bathroom with minimal assistance using cane. Patient stood to urinate; patient moderate assist for toileting. Patient then washed hands with minimal assistance standing sink side with minimal assist for standing balance.   Patient then ambulated to his chair using cane with minimal assistance. Patient slowly, and steadily progressing towards goals. Cont OT per tx plan. Mr. Percy Beavers was discharged from our facility before further treatment could be provided in this setting. This section established at most recent assessment   PROBLEM LIST (Impairments causing functional limitations):  1. Decreased Strength  2. Decreased ADL/Functional Activities  3. Decreased Transfer Abilities  4. Decreased Ambulation Ability/Technique  5. Decreased Balance  6. Increased Pain  7. Decreased Activity Tolerance  8. Increased Shortness of Breath  9. Decreased Flexibility/Joint Mobility  10. Decreased Knowledge of Precautions  11. Decreased [place] with Home Exercise Program   INTERVENTIONS PLANNED: (Benefits and precautions of occupational therapy have been discussed with the patient.)  1. Activities of daily living training  2. Adaptive equipment training  3. Balance training  4. Clothing management  5. Donning&doffing training  6. Neuromuscular re-eduation  7. Therapeutic activity  8. Therapeutic exercise     TREATMENT PLAN: Frequency/Duration: Follow patient 3 time per week to address above goals. Rehabilitation Potential For Stated Goals: Excellent     RECOMMENDED REHABILITATION/EQUIPMENT: (at time of discharge pending progress): Due to the probability of continued deficits (see above) this patient will likely need continued skilled occupational therapy after discharge. Equipment:    TBD              OCCUPATIONAL PROFILE AND HISTORY:   History of Present Injury/Illness (Reason for Referral):  See H&P  Past Medical History/Comorbidities:   Mr. Percy Beavers  has a past medical history of Chronic obstructive pulmonary disease ([facility] [address] Dyspnea (7/21/2016); Gastrointestinal disorder; and Heart failure ([facility] [address] Mr. Percy Beavers  has a past surgical history that includes hx orthopaedic and hx gi.   Social History/Living Environment:   Home Environment: Trailer/mobile home  Wheelchair Ramp: Yes  One/Two Story Residence: One story  Living Alone: No  Support Systems: Family member(s)  Patient Expects to be Discharged to[de-identified] Trailer/mobile home  Current DME Used/Available at Home: Walker, rolling, Cane, straight  Tub or Shower Type: Tub/Shower combination   Prior Level of Function/Work/Activity:  Pt lives at home with his niece and nephew. Pt states nephew works occasionally and niece is disabled and unable to provide physical assistance. Pt was completing functional mobility with cane. Pt was completing most ADL without assistance but needing additional time and reports difficulty. Pt unable to step over tub/shower and stand for long period of time, therefore he was completing sponge bath. Personal Factors:          Social Background:  Lives with niece who is unable to assist pt physically as needed        Past/Current Experience:  Severe headache, arm in sling with decreased use for functional transfers        Other factors that influence how disability is experienced by the patient: COPD   Number of Personal Factors/Comorbidities that affect the Plan of Care: Extensive review of physical, cognitive, and psychosocial performance (3+):  HIGH COMPLEXITY   ASSESSMENT OF OCCUPATIONAL PERFORMANCE[de-identified]   Activities of Daily Living:           Basic ADLs (From Assessment) Complex ADLs (From Assessment)   Basic ADL  Feeding: Stand-by assistance  Oral Facial Hygiene/Grooming: Moderate assistance  Bathing: Moderate assistance  Upper Body Dressing: Moderate assistance  Lower Body Dressing: Maximum assistance  Toileting: Moderate assistance Instrumental ADL  Meal Preparation: Maximum assistance  Homemaking: Total assistance   Grooming/Bathing/Dressing Activities of Daily Living   Grooming  Washing Hands: Minimum assistance (steadying assist and assist to wash hands) Cognitive Retraining  Safety/Judgement: Awareness of environment; Fall prevention     Feeding  Feeding Assistance: Supervision/set-up     Toileting  Toileting Assistance: Minimum assistance (steadying assist for balance)  Cues: Verbal cues provided; Tactile cues provided     Functional Transfers  Bathroom Mobility: Minimum assistance (using cane for balance)     Bed/Mat Mobility  Sit to Stand: Minimum assistance       Most Recent Physical Functioning:   Gross Assessment:                  Posture:  Posture (WDL): Exceptions to WDL  Posture Assessment: Forward head, Kyphosis  Balance:    Bed Mobility:     Wheelchair Mobility:     Transfers:  Sit to Stand: Minimum assistance  Stand to Sit: Minimum assistance            Patient Vitals for the past 6 hrs:   BP BP Patient Position SpO2 Pulse   18 0826 124/87 - - 89   18 0950 119/77 At rest 99 % 85       Mental Status  Neurologic State: Alert  Orientation Level: Oriented X4  Cognition: Follows commands  Perception: Appears intact  Perseveration: No perseveration noted  Safety/Judgement: Awareness of environment, Fall prevention                          Physical Skills Involved:  1. Range of Motion  2. Balance  3. Strength  4. Activity Tolerance  5. Sensation  6. Pain (acute) Cognitive Skills Affected (resulting in the inability to perform in a timely and safe manner):  1. Executive Function Psychosocial Skills Affected:  1. Habits/Routines  2. Self-Awareness   Number of elements that affect the Plan of Care: 5+:  HIGH COMPLEXITY   CLINICAL DECISION MAKIN [facility] [address] [id number] AM-PAC 6 Clicks   Daily Activity Inpatient Short Form  How much help from another person does the patient currently need. .. Total A Lot A Little None   1. Putting on and taking off regular lower body clothing? [] 1   [x] 2   [] 3   [] 4   2. Bathing (including washing, rinsing, drying)? [] 1   [x] 2   [] 3   [] 4   3. Toileting, which includes using toilet, bedpan or urinal?   [] 1   [x] 2   [] 3   [] 4   4. Putting on and taking off regular upper body clothing? [] 1   [x] 2   [] 3   [] 4   5.   Taking care of personal grooming such as brushing teeth?   [] 1   [x] 2   [] 3   [] 4   6. Eating meals? [] 1   [] 2   [x] 3   [] 4   © 2007, Trustees of [address] [place] [address] [address] [address], under license to [employer]. All rights reserved      Score:  Initial: 13 Most Recent: X (Date: -- )    Interpretation of Tool:  Represents activities that are increasingly more difficult (i.e. Bed mobility, Transfers, Gait). Score 24 23 22-20 19-15 14-10 9-7 6     Modifier CH CI CJ CK CL CM CN      ? Self Care:     - CURRENT STATUS: CL - 60%-79% impaired, limited or restricted    - GOAL STATUS: CK - 40%-59% impaired, limited or restricted    - D/C STATUS:  CK - 40%-59% impaired, limited or restricted  Payor: [employer] MEDICARE / Plan: [employer] [place] PPO/PFFS / Product Type: [employer] Medicare /      Medical Necessity:     · Patient demonstrates excellent rehab potential due to higher previous functional level. Reason for Services/Other Comments:  · Patient continues to require skilled intervention due to decreased independence with ADL/functional transfers. Use of outcome tool(s) and clinical judgement create a POC that gives a: MODERATE COMPLEXITY         TREATMENT:   (In addition to Assessment/Re-Assessment sessions the following treatments were rendered)     Pre-treatment Symptoms/Complaints:    Pain: Initial:   Pain Intensity 1: 0  Post Session:  same     Assessment/Reassessment only, no treatment provided today    Braces/Orthotics/Lines/Etc:   · sling to L UE  · O2 Device: Room air  Treatment/Session Assessment:    · Response to Treatment:  Evaluation only. · Interdisciplinary Collaboration:   o Occupational Therapist  o Registered Nurse  · After treatment position/precautions:   o Up in chair  o Bed/Chair-wheels locked  o Call light within reach  o RN notified   · Compliance with Program/Exercises: Will assess as treatment progresses. · Recommendations/Intent for next treatment session:   \"Next visit will focus on advancements to more challenging activities and reduction in assistance provided\".   Total Treatment Duration:  OT Patient Time In/Time Out  Time In: 1244  Time Out: [address], OT

## 2018-01-26 NOTE — DISCHARGE SUMMARY
[id number] [facility] 121 Cardiology Discharge Summary     Patient ID:  Ma Exon  [id number]  72 y.o.  1940    Admit date: 1/22/2018    Discharge date and time:  1-    Admitting Physician: Jazmyn Silva MD     Discharge Physician: DELMY Mckinney/    Admission Diagnoses: Atrial fibrillation, unspecified type [facility]) [I48.91]    Discharge Diagnoses:    Diagnosis    [facility]omyopathy [facility]    Abdominal aortic aneurysm without rupture ([facility]    Dyspnea    Chronic obstructive pulmonary disease ([facility]    Shortness of breath    Headache    Chest pain    Chronic renal failure    COPD (chronic obstructive pulmonary disease) ([facility]    Permanent atrial fibrillation ([facility]    TIA (transient ischemic attack)    Mitral valve regurgitation    HTN (hypertension)       Cardiology Procedures this admission:  Pacemaker Implantation, CXR  Consults: none    Hospital Course: Pt was admitted with a fib w RVR w plans for BIV PM upgrade. Pt was scheduled for an AM Admission PM implantation at [facility] on 1-22-18. Pt came in to [facility] and was taken to the cath lab by Dr. Hollie Leon. Pt received a [employer] BIV pacemaker without complication. Follow up CXR showed no pneumothorax. Pt tolerated the procedure well and was taken to the telemetry floor for recovery. The plan was to f/u with AV node ablation at later date. The patient was very weak and felt he needed rehab placement. He was evaluated by PT/OT and social work with placement arranged. The afternoon of 1-26-18 the pt was up feeling well without any complaints of CP, SOB or palpitations. Pt's left subclavian PM site was clean, dry and intact without hematoma. Pt's labs were WNL w INR 1.3 (coumadin restarted). Pt was seen and examined by Dr. Hollie Leon and determined stable and ready for discharge. Pt was instructed to follow PM discharge instructions given by nursing staff. F/U w Dr Hollie Leon 2-2-18 at 10:00 w device check.      DISPOSITION: The patient is being discharged tp rehab in stable condition on a low saturated fat, low cholesterol and low salt diet. Pt is instructed to advance activities as tolerated limited to fatigue or shortness of breath. Pt is instructed not to lift anything over 5-10 lbs with affected arm. No pushing or pulling or lifting arm above shoulder. See complete discharge instructions. Pt is instructed to watch PM site for bleeding/oozing, if seen Pt is instructed to apply firm pressure with clean cloth and call office at [phone]. Pt is instructed to watch for signs of infection which include increasing area of redness around site, fever/hot to touch or purulent drainage. Pt is instructed to call office or return to ER for immediate evaluation of any shortness of breath, chest pain or palpitations. Discharge Exam:   Visit Vitals    /87    Pulse 89    Temp 97.5 °F (36.4 °C)    Resp 18    Ht 5' 7\" (1.702 m)    Wt 68.4 kg (150 lb 12.8 oz)    SpO2 100%    BMI 23.62 kg/m2      Pt has been seen by Dr Olga Ramirez: see his progress note for exam details. Recent Results (from the past 24 hour(s))   PROTHROMBIN TIME + INR    Collection Time: 01/26/18  3:35 AM   Result Value Ref Range    Prothrombin time 15.4 (H) 11.5 - 14.5 sec    INR 1.3           Patient Instructions:   Current Discharge Medication List      START taking these medications    Details   pantoprazole (PROTONIX) 40 mg tablet Take 1 Tab by mouth Daily (before breakfast). Qty: 30 Tab, Refills: 1         CONTINUE these medications which have NOT CHANGED    Details   montelukast (SINGULAIR) 10 mg tablet Take 10 mg by mouth daily. dilTIAZem (CARDIZEM CD) 360 mg ER capsule Take 360 mg by mouth daily. nitroglycerin (NITROSTAT) 0.4 mg SL tablet 1 Tab by SubLINGual route every five (5) minutes as needed for Chest Pain.   Qty: 1 Bottle, Refills: 2      furosemide (LASIX) 40 mg tablet Take 1 tablet daily or as directed  Qty: 135 Tab, Refills: 3    Associated Diagnoses: Edema, unspecified type      guaiFENesin SR (MUCINEX) 600 mg SR tablet Take 400 mg by mouth as needed. fluticasone-vilanterol (BREO ELLIPTA) 100-25 mcg/dose inhaler Take 1 Puff by inhalation daily. albuterol (PROAIR HFA) 90 mcg/actuation inhaler Take  by inhalation. warfarin (COUMADIN) 5 mg tablet 1 to 1 1/2 tabs every day or as directed. Qty: 135 Tab, Refills: 3      simethicone (GAS-X) 125 mg capsule Take 125 mg by mouth four (4) times daily as needed for Flatulence. DOCUSATE CALCIUM (STOOL SOFTENER PO) Take  by mouth. albuterol (ACCUNEB) 1.25 mg/3 mL nebu 1.25 mg by Nebulization route every six (6) hours as needed. acetaminophen (TYLENOL) 500 mg tablet Take  by mouth every six (6) hours as needed for Pain.          STOP taking these medications       digoxin (LANOXIN) 0.125 mg tablet Comments:   Reason for Stopping:         omeprazole (PRILOSEC) 20 mg capsule Comments:   Reason for Stopping:                 Signed:  DELMY Conte  1/26/2018  8:29 AM

## 2018-02-03 ENCOUNTER — TELEPHONE (OUTPATIENT)
Dept: CARDIOLOGY | Age: 78
End: 2018-02-03

## 2018-02-03 RX ORDER — METOPROLOL TARTRATE 25 MG/1
25 TABLET, FILM COATED ORAL 2 TIMES DAILY
Qty: 60 TAB | Refills: 11 | Status: SHIPPED | OUTPATIENT
Start: 2018-02-03 | End: 2018-02-06

## 2018-02-03 NOTE — TELEPHONE ENCOUNTER
Patient called in regarding tachycardia with his new pacemaker. He has not had syncope. He feels like the pacemaker has made his problem worse. Apparently he is being considered for an ablation       We have called in lopressor 25 bid to add to his meds.   he will keep his appt with DR Kirill Bermudez

## 2018-02-20 ENCOUNTER — HOSPITAL ENCOUNTER (OUTPATIENT)
Age: 78
Setting detail: OBSERVATION
Discharge: HOME | End: 2018-02-23
Attending: INTERNAL MEDICINE | Admitting: INTERNAL MEDICINE
Payer: MEDICARE

## 2018-02-20 DIAGNOSIS — I48.21 PERMANENT ATRIAL FIBRILLATION (HCC): Primary | ICD-10-CM

## 2018-02-20 PROBLEM — I48.91 A-FIB (HCC): Status: ACTIVE | Noted: 2018-02-20

## 2018-02-20 PROBLEM — I50.32 CHRONIC DIASTOLIC HEART FAILURE (HCC): Status: ACTIVE | Noted: 2018-02-20

## 2018-02-20 LAB
ATRIAL RATE: 131 BPM
CALCULATED R AXIS, ECG10: -45 DEGREES
CALCULATED T AXIS, ECG11: 81 DEGREES
DIAGNOSIS, 93000: NORMAL
INR PPP: 2.6
MM INDURATION POC: 0 MM (ref 0–5)
PPD POC: NEGATIVE NEGATIVE
PROTHROMBIN TIME: 27.4 SEC (ref 11.5–14.5)
Q-T INTERVAL, ECG07: 316 MS
QRS DURATION, ECG06: 106 MS
QTC CALCULATION (BEZET), ECG08: 471 MS
VENTRICULAR RATE, ECG03: 134 BPM

## 2018-02-20 PROCEDURE — 74011250637 HC RX REV CODE- 250/637: Performed by: INTERNAL MEDICINE

## 2018-02-20 PROCEDURE — 36415 COLL VENOUS BLD VENIPUNCTURE: CPT | Performed by: INTERNAL MEDICINE

## 2018-02-20 PROCEDURE — 65660000000 HC RM CCU STEPDOWN

## 2018-02-20 PROCEDURE — 74011000250 HC RX REV CODE- 250: Performed by: INTERNAL MEDICINE

## 2018-02-20 PROCEDURE — 85610 PROTHROMBIN TIME: CPT | Performed by: INTERNAL MEDICINE

## 2018-02-20 PROCEDURE — 94760 N-INVAS EAR/PLS OXIMETRY 1: CPT

## 2018-02-20 PROCEDURE — 93005 ELECTROCARDIOGRAM TRACING: CPT | Performed by: INTERNAL MEDICINE

## 2018-02-20 PROCEDURE — 74011250637 HC RX REV CODE- 250/637: Performed by: NURSE PRACTITIONER

## 2018-02-20 PROCEDURE — 65390000012 HC CONDITION CODE 44 OBSERVATION

## 2018-02-20 PROCEDURE — 94640 AIRWAY INHALATION TREATMENT: CPT

## 2018-02-20 RX ORDER — ALBUTEROL SULFATE 0.83 MG/ML
1.25 SOLUTION RESPIRATORY (INHALATION)
Status: DISCONTINUED | OUTPATIENT
Start: 2018-02-20 | End: 2018-02-23 | Stop reason: HOSPADM

## 2018-02-20 RX ORDER — NITROGLYCERIN 0.4 MG/1
0.4 TABLET SUBLINGUAL
Status: DISCONTINUED | OUTPATIENT
Start: 2018-02-20 | End: 2018-02-23 | Stop reason: HOSPADM

## 2018-02-20 RX ORDER — WARFARIN 2.5 MG/1
2.5 TABLET ORAL
Status: DISCONTINUED | OUTPATIENT
Start: 2018-02-23 | End: 2018-02-21

## 2018-02-20 RX ORDER — METOPROLOL TARTRATE 25 MG/1
25 TABLET, FILM COATED ORAL 2 TIMES DAILY WITH MEALS
Status: DISCONTINUED | OUTPATIENT
Start: 2018-02-21 | End: 2018-02-23 | Stop reason: HOSPADM

## 2018-02-20 RX ORDER — WARFARIN SODIUM 5 MG/1
5 TABLET ORAL
Status: DISCONTINUED | OUTPATIENT
Start: 2018-02-21 | End: 2018-02-21

## 2018-02-20 RX ORDER — ACETAMINOPHEN 500 MG
500 TABLET ORAL
Status: DISCONTINUED | OUTPATIENT
Start: 2018-02-20 | End: 2018-02-23 | Stop reason: HOSPADM

## 2018-02-20 RX ORDER — AMOXICILLIN 250 MG
1 CAPSULE ORAL DAILY
Status: DISCONTINUED | OUTPATIENT
Start: 2018-02-21 | End: 2018-02-23 | Stop reason: HOSPADM

## 2018-02-20 RX ORDER — DILTIAZEM HYDROCHLORIDE 60 MG/1
60 TABLET, FILM COATED ORAL
Status: DISCONTINUED | OUTPATIENT
Start: 2018-02-20 | End: 2018-02-22

## 2018-02-20 RX ORDER — SODIUM CHLORIDE 0.9 % (FLUSH) 0.9 %
5-10 SYRINGE (ML) INJECTION AS NEEDED
Status: DISCONTINUED | OUTPATIENT
Start: 2018-02-20 | End: 2018-02-23 | Stop reason: SDUPTHER

## 2018-02-20 RX ORDER — ALBUTEROL SULFATE 2.5 MG/.5ML
2.5 SOLUTION RESPIRATORY (INHALATION)
Status: DISCONTINUED | OUTPATIENT
Start: 2018-02-20 | End: 2018-02-23 | Stop reason: HOSPADM

## 2018-02-20 RX ORDER — WARFARIN SODIUM 5 MG/1
5 TABLET ORAL
Status: ON HOLD | COMMUNITY
End: 2018-02-23

## 2018-02-20 RX ORDER — BUDESONIDE 0.5 MG/2ML
500 INHALANT ORAL
Status: DISCONTINUED | OUTPATIENT
Start: 2018-02-20 | End: 2018-02-23 | Stop reason: HOSPADM

## 2018-02-20 RX ORDER — DILTIAZEM HYDROCHLORIDE 180 MG/1
360 CAPSULE, COATED, EXTENDED RELEASE ORAL DAILY
Status: DISCONTINUED | OUTPATIENT
Start: 2018-02-21 | End: 2018-02-20

## 2018-02-20 RX ORDER — SODIUM CHLORIDE 0.9 % (FLUSH) 0.9 %
5-10 SYRINGE (ML) INJECTION EVERY 8 HOURS
Status: DISCONTINUED | OUTPATIENT
Start: 2018-02-20 | End: 2018-02-23 | Stop reason: SDUPTHER

## 2018-02-20 RX ORDER — MONTELUKAST SODIUM 10 MG/1
10 TABLET ORAL DAILY
Status: DISCONTINUED | OUTPATIENT
Start: 2018-02-21 | End: 2018-02-23 | Stop reason: HOSPADM

## 2018-02-20 RX ORDER — WARFARIN 2.5 MG/1
2.5 TABLET ORAL
COMMUNITY
End: 2018-03-02

## 2018-02-20 RX ORDER — PANTOPRAZOLE SODIUM 40 MG/1
40 TABLET, DELAYED RELEASE ORAL
Status: DISCONTINUED | OUTPATIENT
Start: 2018-02-21 | End: 2018-02-23 | Stop reason: HOSPADM

## 2018-02-20 RX ADMIN — ACETAMINOPHEN 500 MG: 500 TABLET, FILM COATED ORAL at 22:43

## 2018-02-20 RX ADMIN — Medication 5 ML: at 22:59

## 2018-02-20 RX ADMIN — DILTIAZEM HYDROCHLORIDE 60 MG: 60 TABLET, FILM COATED ORAL at 22:43

## 2018-02-20 RX ADMIN — BUDESONIDE 500 MCG: 0.5 INHALANT RESPIRATORY (INHALATION) at 19:20

## 2018-02-20 RX ADMIN — ALBUTEROL SULFATE 2.5 MG: 2.5 SOLUTION RESPIRATORY (INHALATION) at 19:20

## 2018-02-20 NOTE — IP AVS SNAPSHOT
[address] [facility] 
 
 
 [address] 
[phone] Patient: Kirill Wheatley MRN: [id number] About your hospitalization You were admitted on:  February 20, 2018 You last received care in the:  [facility] 3 TELEMETRY You were discharged on:  February 23, 2018 Why you were hospitalized Your primary diagnosis was:  Permanent Atrial Fibrillation (Hcc) Your diagnoses also included:  Copd (Chronic Obstructive Pulmonary Disease) (Hcc), Htn (Hypertension), Chronic Diastolic Heart Failure (Hcc), A-Fib (Hcc), Atrial Fibrillation (Hcc) Follow-up Information Follow up With Details Comments Contact Info Alex Huang MD  March 6 at [address] office  [facility]  [address] [facility] [address] 
[phone] Jewell Brasher MD  As needed [address] [facility] 70 [facility] [place] [phone] Your Scheduled Appointments Tuesday March 06, 2018 10:45 AM [facility] HOSPITAL FOLLOW-UP with Alex Huang MD  
1 [facility] ([address] [place] [address]) [address] [address] [facility] [address] [facility] [place] [phone] Tuesday April 10, 2018  2:00 PM EDT RESEARCH with [address] [place] OFFICE ([address] [place] [address]) [address] [employer]  
[address] Angelita Moise [address]  
[phone] Tuesday April 10, 2018  2:00 PM EDT  
OFFICE DEVICE CHECKS with [facility] 39 [facility] OFFICE ([address] [place] [address]) [employer]  
[address] Angelita Moise [address]  
[phone] This upcoming device check will take place IN OUR OFFICE. Please arrive 15 minutes early to review any necessary paperwork requirements. If you have any further questions or need to change this appointment, we are happy to help and can be reached at [phone]. Tuesday April 10, 2018  2:45 PM EDT Office Visit with Ashlee Fisher MD  
 [facility] CARDIOLOGY [place] OFFICE ([address] [place] [address]) [address] 
[address] Angelita Moise [address]  
[phone] Wednesday April 18, 2018 10:30 AM EDT Office Visit with Jarrett David MD  
1 [facility] ([address] [place] [address]) [id number] [place] [phone] Discharge Orders None A check martita indicates which time of day the medication should be taken. My Medications CHANGE how you take these medications Instructions Each Dose to Equal  
 Morning Noon Evening Bedtime * COUMADIN 2.5 mg tablet Generic drug:  warfarin What changed:  Another medication with the same name was changed. Make sure you understand how and when to take each. Your last dose was: Your next dose is: Take 2.5 mg by mouth every Monday, Wednesday, Friday. 2.5 mg  
    
   
   
   
  
 * warfarin 5 mg tablet Commonly known as:  COUMADIN Start taking on:  2/26/2018 What changed:   
- when to take this 
- additional instructions Your last dose was: Your next dose is: Take 1 Tab by mouth four (4) days a week. 5 mg tues, Thursday, sat and Sunday  
 5 mg  
    
   
   
   
  
 furosemide 40 mg tablet Commonly known as:  LASIX What changed:   
- how much to take - when to take this 
- reasons to take this 
- additional instructions Your last dose was: Your next dose is: Take 1 tablet daily or as directed * Notice: This list has 2 medication(s) that are the same as other medications prescribed for you. Read the directions carefully, and ask your doctor or other care provider to review them with you. CONTINUE taking these medications Instructions Each Dose to Equal  
 Morning Noon Evening Bedtime  
 acetaminophen 500 mg tablet Commonly known as:  TYLENOL Your last dose was: Your next dose is: Take  by mouth every six (6) hours as needed for Pain. * albuterol 1.25 mg/3 mL Nebu Commonly known as:  Lana Precise Your last dose was: Your next dose is: 1.25 mg by Nebulization route every six (6) hours as needed. 1.25 mg  
    
   
   
   
  
 * PROAIR HFA 90 mcg/actuation inhaler Generic drug:  albuterol Your last dose was: Your next dose is: Take  by inhalation. * BREO ELLIPTA 100-25 mcg/dose inhaler Generic drug:  fluticasone-vilanterol Your last dose was: Your next dose is: Take 1 Puff by inhalation daily. 1 Puff * BREO ELLIPTA 100-25 mcg/dose inhaler Generic drug:  fluticasone-vilanterol Your last dose was: Your next dose is: Take 1 Puff by inhalation daily. 1 Puff  
    
   
   
   
  
 guaiFENesin  mg SR tablet Commonly known as:  Johnathon & Johnathon Your last dose was: Your next dose is: Take 400 mg by mouth as needed. 400 mg  
    
   
   
   
  
 nitroglycerin 0.4 mg SL tablet Commonly known as:  NITROSTAT Your last dose was: Your next dose is:    
   
   
 1 Tab by SubLINGual route every five (5) minutes as needed for Chest Pain. 0.4 mg  
    
   
   
   
  
 omeprazole 20 mg capsule Commonly known as:  PRILOSEC Your last dose was: Your next dose is: Take 20 mg by mouth daily. 20 mg SENNA PLUS 8.6-50 mg per tablet Generic drug:  senna-docusate Your last dose was: Your next dose is: Take 1 Tab by mouth daily. 1 Tab SINGULAIR 10 mg tablet Generic drug:  montelukast  
   
Your last dose was: Your next dose is: Take 10 mg by mouth daily.   
 10 mg  
    
   
   
   
  
 * Notice: This list has 4 medication(s) that are the same as other medications prescribed for you. Read the directions carefully, and ask your doctor or other care provider to review them with you. STOP taking these medications CARDIZEM  mg ER capsule Generic drug:  dilTIAZem ASK your doctor about these medications Instructions Each Dose to Equal  
 Morning Noon Evening Bedtime  
 metoprolol tartrate 25 mg tablet Commonly known as:  LOPRESSOR Your last dose was: Your next dose is: Take 1 Tab by mouth daily. 25 mg Where to Get Your Medications Information on where to get these meds will be given to you by the nurse or doctor. ! Ask your nurse or doctor about these medications  
  warfarin 5 mg tablet Discharge Instructions Learning About Fluid Overload What is fluid overload? Fluid overload means that your body has too much water. The extra fluid in your body can raise your blood pressure and force your heart to work harder. It can also make it hard for you to breathe. Most of your body is made up of water. The body uses minerals like sodium and potassium to help organs such as your heart, kidneys, and liver balance how much water you need. For example, the heart pumps blood to move water around the body. And the kidneys work to get rid of the water that the body doesn't need. Health conditions like kidney disease, heart failure, and cirrhosis can cause fluid overload. Other things can cause extra fluid to build up. IV fluids, some medicines, too much salt (sodium) from food, and certain medical treatments can sometimes cause this fluid increase. What are the symptoms? Some of the most common symptoms are: 
· Gaining weight over a short period of time. · Swelling in the ankles or legs. · Shortness of breath. How is it treated? The goal of treatment is to remove the extra fluid in your body. Your treatment will depend on the cause. Your doctor may: · Give you medicines, such as diuretics (also called \"water pills\"). They help your body get rid of the extra fluid. · Restrict your fluid or salt intake. Follow-up care is a key part of your treatment and safety. Be sure to make and go to all appointments, and call your doctor if you are having problems. It's also a good idea to know your test results and keep a list of the medicines you take. Where can you learn more? Go to http://maryCake Healthles.info/. Enter O110 in the search box to learn more about \"Learning About Fluid Overload. \" Current as of: September 21, 2016 Content Version: 11.4 © 1729-7673 Guanxi.me. Care instructions adapted under license by Blink Messenger (which disclaims liability or warranty for this information). If you have questions about a medical condition or this instruction, always ask your healthcare professional. [place] [address] any warranty or liability for your use of this information. Electrophysiology Study and Catheter Ablation: What to Expect at [facility] Your Recovery You had an electrophysiology study for a problem with your heartbeat. You may also have had a catheter ablation to try to correct the problem. You may have swelling, bruising, or a small lump around the site where the catheters went into your body. These should go away in 3 to 4 weeks. Do not exercise hard or lift anything heavy for a week. You may be able to go back to work and to your normal routine in 1 or 2 days. This care sheet gives you a general idea about how long it will take for you to recover. But each person recovers at a different pace. Follow the steps below to get better as quickly as possible. How can you care for yourself at home? Activity ? · For 1 week, do not lift anything that would make you strain. This may include heavy grocery bags and milk containers, a heavy briefcase or backpack, cat litter or dog food bags, a vacuum , or a child. ? · For 1 week, do not exercise hard or do any activity that could strain your blood vessels or the site where the catheters went into your body. ? · Ask your doctor when it is okay to have sex. ? · You may shower 24 to 48 hours after the procedure, if your doctor okays it. Pat the incision dry. Do not take a bath for 1 week, or until your doctor tells you it isokay. Diet ? · You can eat your normal diet. If your stomach is upset, try bland, low-fat foods like plain rice, broiled chicken, toast, and yogurt. ? · Drink plenty of fluids (unless your doctor tells you not to). Medicines ? · Your doctor will tell you if and when you can restart your medicines. He or she will also give you instructions about taking any new medicines. ? · If you take blood thinners, such as warfarin (Coumadin), clopidogrel (Plavix), or aspirin, be sure to talk to your doctor. He or she will tell you if and when to start taking those medicines again. Make sure that you understand exactly what your doctor wants you to do. ? · Ask your doctor if you can take acetaminophen (Tylenol) for pain. Do not take aspirin for 3 days, unless your doctor says it is okay. ? · Check with your doctor before you take aspirin or anti-inflammatory medicines to reduce pain and swelling. These include ibuprofen (Advil, Motrin) and naproxen (Aleve). ? · Make sure you know which heart medicines to continue and which ones to stop. Ask your doctor if you are not sure. ?Catheter site care ? · You can remove your bandages the day after the procedure. Follow-up care is a key part of your treatment and safety.  Be sure to make and go to all appointments, and call your doctor if you are having problems. It's also a good idea to know your test results and keep a list of the medicines you take. When should you call for help? Call 911 anytime you think you may need emergency care. For example, call if: 
? · You passed out (lost consciousness). ? · You have symptoms of a heart attack. These may include: ¨ Chest pain or pressure, or a strange feeling in the chest. 
¨ Sweating. ¨ Shortness of breath. ¨ Nausea or vomiting. ¨ Pain, pressure, or a strange feeling in the back, neck, jaw, or upper belly, or in one or both shoulders or arms. ¨ Lightheadedness or sudden weakness. ¨ A fast or irregular heartbeat. After you call 911, the  may tell you to chew 1 adult-strength or 2 to 4 low-dose aspirin. Wait for an ambulance. Do not try to drive yourself. ? · You have symptoms of a stroke. These may include: 
¨ Sudden numbness, tingling, weakness, or loss of movement in your face, arm, or leg, especially on jose side of your body. ¨ Sudden vision changes. ¨ Sudden trouble speaking. ¨ Sudden confusion or trouble understanding simple statements. ¨ Sudden problems with walking or balance. ¨ A sudden, severe headache that is different from past headaches. ?Call your doctor now or seek immediate medical care if: 
? · You are bleeding from the area where the catheter was put in your artery. ? · You have a fast-growing, painful lump at the catheter site. ? · You have signs of infection, such as: 
¨ Increased pain, swelling, warmth, or redness. ¨ Red streaks leading from the catheter site. ¨ Pus draining from the catheter site. ¨ A fever. ? · Your leg or arm looks blue or feels cold, numb, or tingly. ? Watch closely for any changes in your health, and be sure to contact your doctor if you have any problems. Where can you learn more? Go to http://mary-les.info/.  
Enter [phone] in the search box to learn more about \"Electrophysiology Study and Catheter Ablation: What to Expect at Home. \" Current as of: September 21, 2016 Content Version: 11.4 © 9009-1921 Skycross. Care instructions adapted under license by Therative (which disclaims liability or warranty for this information). If you have questions about a medical condition or this instruction, always ask your healthcare professional. [address] any warranty or liability for your use of this information. Atrial Flutter: Care Instructions Your Care Instructions Atrial flutter is a type of heartbeat problem (arrhythmia) that usually causes a fast heart rate. In atrial flutter, a problem with the heart's electrical system causes the two upper parts of the heart (the right atrium and the left atrium) to flutter, or beat very fast. Atrial flutter might be diagnosed using an an electrocardiogram (EKG). An EKG translates the heart's electrical activity into line tracings on paper. Treating atrial flutter is important for several reasons. The change in heartbeat can cause blood clots. The clots can travel from your heart to your brain and cause a stroke. A fast heartbeat can make you feel lightheaded, dizzy, and weak. And over time, it can also increase your risk for heart failure. Atrial flutter is often the result of another heart condition, such as coronary artery disease or some other heart rhythm problems. Making changes to improve your heart health will help you stay healthy and active. Your doctor may prescribe medicines to help slow down your heartbeat. You may also take medicine to help prevent a stroke. In some cases, a procedure called catheter ablation is done to stop atrial flutter. Follow-up care is a key part of your treatment and safety. Be sure to make and go to all appointments, and call your doctor if you are having problems. It's also a good idea to know your test results and keep a list of the medicines you take. How can you care for yourself at home? Medicines ? · Take your medicines exactly as prescribed. Call your doctor if you think you are having a problem with your medicine. You will get more details on the specific medicines your doctor prescribes. ? · If your doctor has given you a blood thinner to prevent a stroke, be sure you get instructions about how to take your medicine safely. Blood thinners can cause serious bleeding problems. ? · Do not take any vitamins, over-the-counter drugs, or herbal products without talking to your doctor first. ? Lifestyle changes ? · Do not smoke. Smoking can increase your chance of a stroke and heart attack. If you need help quitting, talk to your doctor about stop-smoking programs and medicines. These can increase your chances of quitting for good. ? · Eat a heart-healthy diet. ? · Stay at a healthy weight. Lose weight if you need to.  
? · Limit alcohol to 2 drinks a day for men and 1 drink a day for women. Too much alcohol can cause health problems. ? · Avoid colds and flu. Get a pneumococcal vaccine shot. If you have had one before, ask your doctor whether you need another dose. Get a flu shot every year. If you must be around people with colds or flu, wash your hands often. Activity ? · Talk to your doctor about what type and level of exercise is safe for you. Start light exercise if your doctor says it is okay. Walking is a good choice. Try for at least 30 minutes on most days of the week. You also may want to swim, bike, or do other activities. ? · When you exercise, watch for signs that your heart is working too hard. You are pushing too hard if you can't talk while you exercise. If you become short of breath or dizzy or have chest pain, sit down and rest right away. When should you call for help? Call 911 anytime you think you may need emergency care. For example, call if: 
? · You have symptoms of a stroke. These may include: ¨ Sudden numbness, tingling, weakness, or loss of movement in your face, arm, or leg, especially on only one side of your body. ¨ Sudden vision changes. ¨ Sudden trouble speaking. ¨ Sudden confusion or trouble understanding simple statements. ¨ Sudden problems with walking or balance. ¨ A sudden, severe headache that is different from past headaches. ? · You passed out (lost consciousness). ?Call your doctor now or seek immediate medical care if: 
? · You have new or increased shortness of breath. ? · You feel dizzy or lightheaded, or you feel like you may faint. ? · Your heart rate becomes irregular. ? · You can feel your heart flutter in your chest or skip heartbeats. Tell your doctor if these symptoms are new or worse. ? Watch closely for changes in your health, and be sure to contact your doctor if you have any problems. Where can you learn more? Go to http://mary-les.info/. Enter B278 in the search box to learn more about \"Atrial Flutter: Care Instructions. \" Current as of: September 21, 2016 Content Version: 11.4 © 5306-9657 ICE Entertainment. Care instructions adapted under license by Wordlock (which disclaims liability or warranty for this information). If you have questions about a medical condition or this instruction, always ask your healthcare professional. [address] any warranty or liability for your use of this information. Wantful Announcement We are excited to announce that we are making your provider's discharge notes available to you in Wantful. You will see these notes when they are completed and signed by the physician that discharged you from your recent hospital stay.   If you have any questions or concerns about any information you see in Wantful, please call the Health Information Department where you were seen or reach out to your Primary Care Provider for more information about your plan of care. Introducing [facility]! Nena Pritchard introduces Bitstamp patient portal. Now you can access parts of your medical record, email your doctor's office, and request medication refills online. 1. In your internet browser, go to [email or web address] 2. Click on the First Time User? Click Here link in the Sign In box. You will see the New Member Sign Up page. 3. Enter your Bitstamp Access Code exactly as it appears below. You will not need to use this code after youve completed the sign-up process. If you do not sign up before the expiration date, you must request a new code. · Bitstamp Access Code: [email or web address] Expires: 4/9/2018  9:32 AM 
 
4. Enter the last four digits of your Social Security Number (xxxx) and Date of Birth (mm/dd/yyyy) as indicated and click Submit. You will be taken to the next sign-up page. 5. Create a Bitstamp ID. This will be your Bitstamp login ID and cannot be changed, so think of one that is secure and easy to remember. 6. Create a Bitstamp password. You can change your password at any time. 7. Enter your Password Reset Question and Answer. This can be used at a later time if you forget your password. 8. Enter your e-mail address. You will receive e-mail notification when new information is available in [address] 9. Click Sign Up. You can now view and download portions of your medical record. 10. Click the Download Summary menu link to download a portable copy of your medical information. If you have questions, please visit the Frequently Asked Questions section of the Bitstamp website. Remember, Bitstamp is NOT to be used for urgent needs. For medical emergencies, dial 911. Now available from your iPhone and Android! Providers Seen During Your Hospitalization Provider Specialty Primary office phone Annette Powers MD Cardiology [phone] Your Primary Care Physician (PCP) Primary Care Physician Office Phone Office Fax Burley Ormond [phone] You are allergic to the following Allergen Reactions Levaquin (Levofloxacin) Other (comments) Prednisone Palpitations Recent Documentation Height Weight BMI Smoking Status 1.702 m 67.8 kg 23.4 kg/m2 Never Smoker Emergency Contacts Name Discharge Info Relation Home Work Mobile Connor Fletcher DISCHARGE CAREGIVER [3] Other Relative [6] [phone] Maximiliano Irwin DISCHARGE CAREGIVER [3] Other Relative [6] [phone] Lynn Irwin DISCHARGE CAREGIVER [3] Other Relative [6] [phone] Patient Belongings The following personal items are in your possession at time of discharge: 
  Dental Appliances: Uppers, Partials, Lowers (lowers full)         Home Medications: None   Jewelry: None  Clothing: At bedside    Other Valuables: Cell Phone Please provide this summary of care documentation to your next provider. Signatures-by signing, you are acknowledging that this After Visit Summary has been reviewed with you and you have received a copy. Patient Signature:  ____________________________________________________________ Date:  ____________________________________________________________  
  
Mendy Quezada Provider Signature:  ____________________________________________________________ Date:  ____________________________________________________________

## 2018-02-20 NOTE — H&P
[facility] Cardiology H&P    Admitting Cardiologist:Dr. Scott Viera    Primary Cardiologist:Dr. Savage Hilton    Primary Care Physician:Dr. Guillaume    Subjective:     Donald Garcia is a 76 y.o. male with known hx of DHF, chronic afib, recent placement of [facility] BIV PM by Dr. Clary Del Valle in January. He was actually scheduled to come for AV node ablation but developed worsening SOB last week and presented to [facility] on 2/17. He was admitted for pulmonary edema and afib with RVR. He was diuresed but afib remains rapid. He has had mild hypotension. He is now clinically much improved, euvolemic and without complaints. Plan for AV node ablation this admission now. He denies cp, orthopnea on PND at present. Prior cath and nuclear stress test unremarkable with documented preserved EF by last echo in 2017. Review of records from [facility] notes therapeutic INR, normal bmp, mild anemia 10/30. Past Medical History:   Diagnosis Date    [facility] obstructive [facility]     Dyspnea 7/21/2016    Gastrointestinal disorder     Heart failure ([facility]       Past Surgical History:   Procedure Laterality Date    HX GI      HX ORTHOPAEDIC        No current facility-administered medications for this encounter. Allergies   Allergen Reactions    Levaquin [Levofloxacin] Other (comments)    Prednisone Palpitations      Social History   Substance Use Topics    Smoking status: Never Smoker    Smokeless tobacco: Never Used    Alcohol use No       Review of Systems  Gen: Denies fever, chills, malaise or fatigue. Appetite good.    HEENT: Denies frequent headaches, dizzyness, visual disturbances, Neck pain or swallowing difficulty  Lungs: Denies shortness of breath, hx of COPD, breathing problems  Cardiovascular:as above   GI: Denies hememesis, dark tarry stools, No prior Hx of GI bleed, Denies constipation  : Denies dysuria, no complaints of frequency, nocturia  Heme: No prior bleeding disorders, no prior Cancer  Neuro: Denies prior CVA, TIA. Endocrine: no diabetes, thyroid disorders  Psychiatric: Denies anxiety, or other psychiatric illnesses. Objective:     General:Alert, cooperative, no distress, appears stated age  Head: Normocephalic, without obvious abnormality, atraumatic. Eyes: Conjunctivae/corneas clear. PERRL, EOMs intact  Nose:Nares normal. Septum midline. Mucosa normal. No drainage or sinus tenderness. Throat: Lips, mucosa, and tongue normal. Teeth and gums normal.   Neck: Supple, symmetrical, trachea midline,  no carotid bruit and no JVD. Lungs:Clear to auscultation bilaterally. Chest wall: No tenderness or deformity. Heart: tachycardic, irregular   Abdomen:Soft, non-tender. Bowel sounds normal. No masses, No organomegaly. Extremities: Extremities normal, atraumatic, no cyanosis or edema. Pulses: 2+ and symmetric all extremities. Skin: Skin color, texture, turgor normal. No rashes or lesions  Lymph nodes: Cervical, supraclavicular, and axillary nodes normal  Neurologic:No focal deficits identified                     Data Review:     As above    Assessment / Plan     Principal Problem:    Permanent atrial fibrillation (HCC) (10/20/2015)--difficult to control rate, INR therapuetic, plans for AV node ablation in am. Continue coumadin. BIV PM in place. Active Problems:    COPD (chronic obstructive pulmonary disease) ([address] (10/20/2015)--stable continue home meds. HTN (hypertension) (10/20/2015)--continue home meds. Chronic diastolic heart failure (HCC) (2/20/2018)--euvolemic, volume status stable. Disposition --Precertification apparently has been started for [facility] rehab.  PPD placed at [facility] on 2/19        Dianna Max NP

## 2018-02-20 NOTE — IP AVS SNAPSHOT
[address] 
[phone] Patient: Namrata Camilo MRN: [id number] [facility]:[birth year] A check martita indicates which time of day the medication should be taken. My Medications CHANGE how you take these medications Instructions Each Dose to Equal  
 Morning Noon Evening Bedtime * COUMADIN 2.5 mg tablet Generic drug:  warfarin What changed:  Another medication with the same name was changed. Make sure you understand how and when to take each. Your last dose was: Your next dose is: Take 2.5 mg by mouth every Monday, Wednesday, Friday. 2.5 mg  
    
   
   
   
  
 * warfarin 5 mg tablet Commonly known as:  COUMADIN Start taking on:  2/26/2018 What changed:   
- when to take this 
- additional instructions Your last dose was: Your next dose is: Take 1 Tab by mouth four (4) days a week. 5 mg tues, Thursday, sat and Sunday  
 5 mg  
    
   
   
   
  
 furosemide 40 mg tablet Commonly known as:  LASIX What changed:   
- how much to take - when to take this 
- reasons to take this 
- additional instructions Your last dose was: Your next dose is: Take 1 tablet daily or as directed * Notice: This list has 2 medication(s) that are the same as other medications prescribed for you. Read the directions carefully, and ask your doctor or other care provider to review them with you. CONTINUE taking these medications Instructions Each Dose to Equal  
 Morning Noon Evening Bedtime  
 acetaminophen 500 mg tablet Commonly known as:  TYLENOL Your last dose was: Your next dose is: Take  by mouth every six (6) hours as needed for Pain. * albuterol 1.25 mg/3 mL Nebu Commonly known as:  Cheyanne Mercado Your last dose was: Your next dose is: 1.25 mg by Nebulization route every six (6) hours as needed. 1.25 mg  
    
   
   
   
  
 * PROAIR HFA 90 mcg/actuation inhaler Generic drug:  albuterol Your last dose was: Your next dose is: Take  by inhalation. * BREO ELLIPTA 100-25 mcg/dose inhaler Generic drug:  fluticasone-vilanterol Your last dose was: Your next dose is: Take 1 Puff by inhalation daily. 1 Puff * BREO ELLIPTA 100-25 mcg/dose inhaler Generic drug:  fluticasone-vilanterol Your last dose was: Your next dose is: Take 1 Puff by inhalation daily. 1 Puff  
    
   
   
   
  
 guaiFENesin  mg SR tablet Commonly known as:  Johnathon & Johnathon Your last dose was: Your next dose is: Take 400 mg by mouth as needed. 400 mg  
    
   
   
   
  
 nitroglycerin 0.4 mg SL tablet Commonly known as:  NITROSTAT Your last dose was: Your next dose is:    
   
   
 1 Tab by SubLINGual route every five (5) minutes as needed for Chest Pain. 0.4 mg  
    
   
   
   
  
 omeprazole 20 mg capsule Commonly known as:  PRILOSEC Your last dose was: Your next dose is: Take 20 mg by mouth daily. 20 mg SENNA PLUS 8.6-50 mg per tablet Generic drug:  senna-docusate Your last dose was: Your next dose is: Take 1 Tab by mouth daily. 1 Tab SINGULAIR 10 mg tablet Generic drug:  montelukast  
   
Your last dose was: Your next dose is: Take 10 mg by mouth daily. 10 mg  
    
   
   
   
  
 * Notice: This list has 4 medication(s) that are the same as other medications prescribed for you. Read the directions carefully, and ask your doctor or other care provider to review them with you. STOP taking these medications CARDIZEM  mg ER capsule Generic drug:  dilTIAZem ASK your doctor about these medications Instructions Each Dose to Equal  
 Morning Noon Evening Bedtime  
 metoprolol tartrate 25 mg tablet Commonly known as:  LOPRESSOR Your last dose was: Your next dose is: Take 1 Tab by mouth daily. 25 mg Where to Get Your Medications Information on where to get these meds will be given to you by the nurse or doctor. ! Ask your nurse or doctor about these medications  
  warfarin 5 mg tablet

## 2018-02-20 NOTE — PROGRESS NOTES
Attending Addendum to H and P completed by Yao BROCK    Patient independently seen and examined by me. Agree with above note by physician extender with the following additions and exceptions:  76 y. o. male with a past medical and cardiac history significant for permanent afib with RVR and uncontrolled HRs, prior TIA, HTN, COPD, CKD, now s/p BiV PPM with planned AV node ablation who was transferred from OSH with afib with RVR and problems with hypotension    Key findings are:  No CP or SEBASTIAN  CV- irreg irreg, tachycardic, soft SM  Lungs- Clear bilaterally  Ext- no edema    Plan: plan for AV node ablation this admission       --cont warfarin, check labs in AM       --cont rate control strategy until AV node ablation       --device interrogation prior to procedure       --further plan as noted by Eura Rater B.  [address]

## 2018-02-20 NOTE — PROGRESS NOTES
Problem: Falls - Risk of  Goal: *Absence of Falls  Document Heather Fall Risk and appropriate interventions in the flowsheet. Outcome: Progressing Towards Goal  Fall Risk Interventions:  Mobility Interventions: Assess mobility with egress test, Communicate number of staff needed for ambulation/transfer, Bed/chair exit alarm      Pt progressing towards goal. No falls since admission. Bed low and locked. Call light within reach. Side rails x 2. Gripper socks applied. Personal belongings within reach. Pt verbalizes understanding to call for assistance. Bed alarm on.           Elimination Interventions: Bed/chair exit alarm, Call light in reach, Elevated toilet seat

## 2018-02-20 NOTE — PROGRESS NOTES
TRANSFER - IN REPORT:    Verbal report received from [address], RN on Bolivar Martinez being received from [facility] for routine progression of care      Report consisted of patients Situation, Background, Assessment and Recommendations(SBAR). Information from the following report(s) Kardex, Recent Results and Cardiac Rhythm A. Fib was reviewed with the receiving nurse. Opportunity for questions and clarification was provided. Assessment completed upon patients arrival to unit and care assumed. Primary RN stated that she placed PPD test on right FA last night on patient making today's read the first 24 hours.

## 2018-02-20 NOTE — PROGRESS NOTES
Skin assessment completed. Patient sacrum dry and intact. Patient heels are dry and flaky. Bilat index toes are tucked under the big toes. Toe nails are long and yellow. Patient has scattered bruising BUE. Patient PPD test on right FA 24 hour read today. Negative read. Patient arrived to floor with 18 gauge IV in right FA.

## 2018-02-21 ENCOUNTER — ANESTHESIA (OUTPATIENT)
Dept: SURGERY | Age: 78
End: 2018-02-21
Payer: MEDICARE

## 2018-02-21 ENCOUNTER — ANESTHESIA EVENT (OUTPATIENT)
Dept: SURGERY | Age: 78
End: 2018-02-21
Payer: MEDICARE

## 2018-02-21 PROBLEM — I48.91 ATRIAL FIBRILLATION (HCC): Status: ACTIVE | Noted: 2018-02-21

## 2018-02-21 LAB
ANION GAP SERPL CALC-SCNC: 8 MMOL/L (ref 7–16)
ATRIAL RATE: 75 BPM
BASOPHILS # BLD: 0.1 K/UL (ref 0–0.2)
BASOPHILS NFR BLD: 1 % (ref 0–2)
BUN SERPL-MCNC: 27 MG/DL (ref 8–23)
CALCIUM SERPL-MCNC: 8.4 MG/DL (ref 8.3–10.4)
CALCULATED R AXIS, ECG10: -98 DEGREES
CALCULATED T AXIS, ECG11: 16 DEGREES
CHLORIDE SERPL-SCNC: 108 MMOL/L (ref 98–107)
CO2 SERPL-SCNC: 24 MMOL/L (ref 21–32)
CREAT SERPL-MCNC: 1.11 MG/DL (ref 0.8–1.5)
DIAGNOSIS, 93000: NORMAL
DIFFERENTIAL METHOD BLD: ABNORMAL
EOSINOPHIL # BLD: 0 K/UL (ref 0–0.8)
EOSINOPHIL NFR BLD: 1 % (ref 0.5–7.8)
ERYTHROCYTE [DISTWIDTH] IN BLOOD BY AUTOMATED COUNT: 13.4 % (ref 11.9–14.6)
GLUCOSE SERPL-MCNC: 114 MG/DL (ref 65–100)
HCT VFR BLD AUTO: 34.6 % (ref 41.1–50.3)
HGB BLD-MCNC: 11.5 G/DL (ref 13.6–17.2)
IMM GRANULOCYTES # BLD: 0 K/UL (ref 0–0.5)
IMM GRANULOCYTES NFR BLD AUTO: 0 % (ref 0–5)
INR PPP: 2.6
LYMPHOCYTES # BLD: 1.1 K/UL (ref 0.5–4.6)
LYMPHOCYTES NFR BLD: 18 % (ref 13–44)
MCH RBC QN AUTO: 29 PG (ref 26.1–32.9)
MCHC RBC AUTO-ENTMCNC: 33.2 G/DL (ref 31.4–35)
MCV RBC AUTO: 87.4 FL (ref 79.6–97.8)
MM INDURATION POC: 0 MM (ref 0–5)
MONOCYTES # BLD: 0.4 K/UL (ref 0.1–1.3)
MONOCYTES NFR BLD: 7 % (ref 4–12)
NEUTS SEG # BLD: 4.4 K/UL (ref 1.7–8.2)
NEUTS SEG NFR BLD: 73 % (ref 43–78)
PLATELET # BLD AUTO: 212 K/UL (ref 150–450)
PMV BLD AUTO: 8.9 FL (ref 10.8–14.1)
POTASSIUM SERPL-SCNC: 4.4 MMOL/L (ref 3.5–5.1)
PPD POC: NEGATIVE NEGATIVE
PROTHROMBIN TIME: 27.3 SEC (ref 11.5–14.5)
Q-T INTERVAL, ECG07: 426 MS
QRS DURATION, ECG06: 142 MS
QTC CALCULATION (BEZET), ECG08: 475 MS
RBC # BLD AUTO: 3.96 M/UL (ref 4.23–5.67)
SODIUM SERPL-SCNC: 140 MMOL/L (ref 136–145)
VENTRICULAR RATE, ECG03: 75 BPM
WBC # BLD AUTO: 5.9 K/UL (ref 4.3–11.1)

## 2018-02-21 PROCEDURE — 94760 N-INVAS EAR/PLS OXIMETRY 1: CPT

## 2018-02-21 PROCEDURE — 99218 HC RM OBSERVATION: CPT

## 2018-02-21 PROCEDURE — C1733 CATH, EP, OTHR THAN COOL-TIP: HCPCS

## 2018-02-21 PROCEDURE — C1894 INTRO/SHEATH, NON-LASER: HCPCS

## 2018-02-21 PROCEDURE — 74011250637 HC RX REV CODE- 250/637: Performed by: INTERNAL MEDICINE

## 2018-02-21 PROCEDURE — 85025 COMPLETE CBC W/AUTO DIFF WBC: CPT | Performed by: INTERNAL MEDICINE

## 2018-02-21 PROCEDURE — 93281 PM DEVICE PROGR EVAL MULTI: CPT

## 2018-02-21 PROCEDURE — 93005 ELECTROCARDIOGRAM TRACING: CPT | Performed by: INTERNAL MEDICINE

## 2018-02-21 PROCEDURE — 76060000032 HC ANESTHESIA 0.5 TO 1 HR: Performed by: INTERNAL MEDICINE

## 2018-02-21 PROCEDURE — 85610 PROTHROMBIN TIME: CPT | Performed by: INTERNAL MEDICINE

## 2018-02-21 PROCEDURE — 74011000250 HC RX REV CODE- 250: Performed by: INTERNAL MEDICINE

## 2018-02-21 PROCEDURE — 93650 ICAR CATH ABLTJ AV NODE FUNC: CPT

## 2018-02-21 PROCEDURE — 65390000012 HC CONDITION CODE 44 OBSERVATION

## 2018-02-21 PROCEDURE — 94640 AIRWAY INHALATION TREATMENT: CPT

## 2018-02-21 PROCEDURE — 74011250636 HC RX REV CODE- 250/636

## 2018-02-21 PROCEDURE — 36415 COLL VENOUS BLD VENIPUNCTURE: CPT | Performed by: INTERNAL MEDICINE

## 2018-02-21 PROCEDURE — 80048 BASIC METABOLIC PNL TOTAL CA: CPT | Performed by: INTERNAL MEDICINE

## 2018-02-21 PROCEDURE — 74011250637 HC RX REV CODE- 250/637: Performed by: NURSE PRACTITIONER

## 2018-02-21 RX ORDER — SODIUM CHLORIDE 0.9 % (FLUSH) 0.9 %
5-10 SYRINGE (ML) INJECTION AS NEEDED
Status: DISCONTINUED | OUTPATIENT
Start: 2018-02-21 | End: 2018-02-23 | Stop reason: HOSPADM

## 2018-02-21 RX ORDER — SODIUM CHLORIDE, SODIUM LACTATE, POTASSIUM CHLORIDE, CALCIUM CHLORIDE 600; 310; 30; 20 MG/100ML; MG/100ML; MG/100ML; MG/100ML
INJECTION, SOLUTION INTRAVENOUS
Status: DISCONTINUED | OUTPATIENT
Start: 2018-02-21 | End: 2018-02-21 | Stop reason: HOSPADM

## 2018-02-21 RX ORDER — PROPOFOL 10 MG/ML
INJECTION, EMULSION INTRAVENOUS AS NEEDED
Status: DISCONTINUED | OUTPATIENT
Start: 2018-02-21 | End: 2018-02-21 | Stop reason: HOSPADM

## 2018-02-21 RX ORDER — WARFARIN 2.5 MG/1
2.5 TABLET ORAL
Status: DISCONTINUED | OUTPATIENT
Start: 2018-02-23 | End: 2018-02-23 | Stop reason: HOSPADM

## 2018-02-21 RX ORDER — OXYCODONE AND ACETAMINOPHEN 5; 325 MG/1; MG/1
1 TABLET ORAL
Status: DISCONTINUED | OUTPATIENT
Start: 2018-02-21 | End: 2018-02-23 | Stop reason: HOSPADM

## 2018-02-21 RX ORDER — PROPOFOL 10 MG/ML
INJECTION, EMULSION INTRAVENOUS
Status: DISCONTINUED | OUTPATIENT
Start: 2018-02-21 | End: 2018-02-21 | Stop reason: HOSPADM

## 2018-02-21 RX ORDER — ACETAMINOPHEN 325 MG/1
650 TABLET ORAL
Status: DISCONTINUED | OUTPATIENT
Start: 2018-02-21 | End: 2018-02-23 | Stop reason: HOSPADM

## 2018-02-21 RX ORDER — WARFARIN SODIUM 5 MG/1
5 TABLET ORAL
Status: DISCONTINUED | OUTPATIENT
Start: 2018-02-22 | End: 2018-02-23 | Stop reason: HOSPADM

## 2018-02-21 RX ORDER — SODIUM CHLORIDE 0.9 % (FLUSH) 0.9 %
5-10 SYRINGE (ML) INJECTION EVERY 8 HOURS
Status: DISCONTINUED | OUTPATIENT
Start: 2018-02-21 | End: 2018-02-23 | Stop reason: HOSPADM

## 2018-02-21 RX ADMIN — MONTELUKAST SODIUM 10 MG: 10 TABLET, FILM COATED ORAL at 21:42

## 2018-02-21 RX ADMIN — BUDESONIDE 500 MCG: 0.5 INHALANT RESPIRATORY (INHALATION) at 19:30

## 2018-02-21 RX ADMIN — METOPROLOL TARTRATE 25 MG: 25 TABLET ORAL at 08:20

## 2018-02-21 RX ADMIN — DILTIAZEM HYDROCHLORIDE 60 MG: 60 TABLET, FILM COATED ORAL at 10:59

## 2018-02-21 RX ADMIN — OXYCODONE HYDROCHLORIDE AND ACETAMINOPHEN 1 TABLET: 5; 325 TABLET ORAL at 21:42

## 2018-02-21 RX ADMIN — DILTIAZEM HYDROCHLORIDE 60 MG: 60 TABLET, FILM COATED ORAL at 18:28

## 2018-02-21 RX ADMIN — STANDARDIZED SENNA CONCENTRATE AND DOCUSATE SODIUM 1 TABLET: 8.6; 5 TABLET, FILM COATED ORAL at 08:19

## 2018-02-21 RX ADMIN — BUDESONIDE 500 MCG: 0.5 INHALANT RESPIRATORY (INHALATION) at 08:00

## 2018-02-21 RX ADMIN — DILTIAZEM HYDROCHLORIDE 60 MG: 60 TABLET, FILM COATED ORAL at 08:20

## 2018-02-21 RX ADMIN — ACETAMINOPHEN 500 MG: 500 TABLET, FILM COATED ORAL at 10:58

## 2018-02-21 RX ADMIN — PANTOPRAZOLE SODIUM 40 MG: 40 TABLET, DELAYED RELEASE ORAL at 08:19

## 2018-02-21 RX ADMIN — ALBUTEROL SULFATE 2.5 MG: 2.5 SOLUTION RESPIRATORY (INHALATION) at 19:30

## 2018-02-21 RX ADMIN — WARFARIN SODIUM 2.5 MG: 5 TABLET ORAL at 18:31

## 2018-02-21 RX ADMIN — ALBUTEROL SULFATE 2.5 MG: 2.5 SOLUTION RESPIRATORY (INHALATION) at 08:00

## 2018-02-21 RX ADMIN — METOPROLOL TARTRATE 25 MG: 25 TABLET ORAL at 18:29

## 2018-02-21 RX ADMIN — ALBUTEROL SULFATE 2.5 MG: 2.5 SOLUTION RESPIRATORY (INHALATION) at 14:04

## 2018-02-21 RX ADMIN — SODIUM CHLORIDE, SODIUM LACTATE, POTASSIUM CHLORIDE, CALCIUM CHLORIDE: 600; 310; 30; 20 INJECTION, SOLUTION INTRAVENOUS at 16:42

## 2018-02-21 RX ADMIN — Medication 10 ML: at 13:45

## 2018-02-21 RX ADMIN — Medication 5 ML: at 18:40

## 2018-02-21 RX ADMIN — Medication 5 ML: at 22:32

## 2018-02-21 RX ADMIN — PROPOFOL 30 MG: 10 INJECTION, EMULSION INTRAVENOUS at 16:49

## 2018-02-21 RX ADMIN — PROPOFOL 100 MCG/KG/MIN: 10 INJECTION, EMULSION INTRAVENOUS at 16:49

## 2018-02-21 NOTE — PROGRESS NOTES
TRANSFER - IN REPORT:    Verbal report received from [id number], RN on Costa Caceres being received from [address] [place] [address] for routine progression of care. Report consisted of patients Situation, Background, Assessment and Recommendations(SBAR). Information from the following reports was reviewed: Kardex, Procedure Summary, MAR and Recent Results. Opportunity for questions and clarification was provided. Patient received to room 317 and connected to telemetry monitor. Assessment completed and plan of care reviewed. right venous groin site c/d/i. Site clean dry and intact without hematoma. BP cycling every 15 minutes. Patient oriented to room and call light. Patient voiced understanding of bedrest. Patient voiced understanding to use call light to communicate needs.

## 2018-02-21 NOTE — ANESTHESIA POSTPROCEDURE EVALUATION
Post-Anesthesia Evaluation and Assessment    Patient: Cristin Negron MRN: [id number]  SSN: [id number]    YOB: 1940  Age: 76 y.o. Sex: male       Cardiovascular Function/Vital Signs  Visit Vitals    /66    Pulse 75    Temp 36.8 °C (98.3 °F)    Resp 20    Ht 5' 7\" (1.702 m)    Wt 67.9 kg (149 lb 11.2 oz)    SpO2 99%    BMI 23.45 kg/m2       Patient is status post total IV anesthesia anesthesia for Procedure(s):  ABLATION OF ATRIAL FIBRILLATION. Nausea/Vomiting: None    Postoperative hydration reviewed and adequate. Pain:  Pain Scale 1: Numeric (0 - 10) (02/21/18 3975)  Pain Intensity 1: 0 (02/21/18 1745)   Managed    Neurological Status: At baseline    Mental Status and Level of Consciousness: Arousable    Pulmonary Status:   O2 Device: Room air (02/21/18 1804)   Adequate oxygenation and airway patent    Complications related to anesthesia: None    Post-anesthesia assessment completed.  No concerns    Signed By: Cornel Walters MD     February 21, 2018

## 2018-02-21 NOTE — PROGRESS NOTES
CM Specialist Yusef Carty met with patient and discussed admission status. Code 40 letter given and content explained due to patient status changing from Inpatient to Observation. Patient verbalized understanding of status change. Medicare Outpatient Observation Notice provided to the patient. Oral explanation was provided and all questions answered. Signed documents placed in the medical chart. Copies provided to patient.

## 2018-02-21 NOTE — PROGRESS NOTES
Attempted to see patient and he was off the floor. Noted progress notes stated possible SNF placement. Will follow up for choices and PT/OT notes. Notified nurse [facility], [facility] of need for PPD and need ok for PT/OT. Patient was recently at [facility]. Was d/c Jan 26 from [address] [place] [address] to [facility] rehab. Will follow up with admission coordinator for number of days patient used at the facility. CM following.

## 2018-02-21 NOTE — ANESTHESIA PREPROCEDURE EVALUATION
Anesthetic History               Review of Systems / Medical History  Patient summary reviewed, nursing notes reviewed and pertinent labs reviewed    Pulmonary    COPD: moderate               Neuro/Psych         TIA     Cardiovascular    Hypertension: well controlled  Valvular problems/murmurs: mitral insufficiency      Dysrhythmias (Permanent a-fib w frequent RVR)       Exercise tolerance: <4 METS     GI/Hepatic/Renal         Renal disease       Endo/Other             Other Findings            Physical Exam    Airway  Mallampati: II  TM Distance: > 6 cm  Neck ROM: decreased range of motion   Mouth opening: Normal     Cardiovascular    Rhythm: irregular  Rate: abnormal         Dental    Dentition: Full lower dentures and Full upper dentures     Pulmonary  Breath sounds clear to auscultation               Abdominal  GI exam deferred       Other Findings            Anesthetic Plan    ASA: 3  Anesthesia type: total IV anesthesia            Anesthetic plan and risks discussed with: Patient    Nephew present

## 2018-02-21 NOTE — PHYSICIAN ADVISORY
Letter of Determination:  Outpatient status receiving Observation Services    This patient was originally hospitalized as Inpatient Status on 2/20/2018 for scheduled atrial ablation. At this time this patient does not appear to meet the medical necessity requirements in CMS regulation Section 43 .3 to support an inpatient level of care. It is our recommendation that this patient's hospitalization status should be changed from INPATIENT to [place] receiving OBSERVATION services via Condition Code 44.      This may change due to the medical condition of the patient and new clinical evidence as the patients care progreses. The final decision regarding the patient's hospitalization status depends on the attending physician's judgement.     Blayne Landers MD, STAN,   Physician Alex Torres.

## 2018-02-21 NOTE — PROGRESS NOTES
TRANSFER - OUT REPORT:    Verbal report given to [facility] RN on Diana Irvin  being transferred to [facility] (unit) for routine progression of care       Report consisted of patients Situation, Background, Assessment and   Recommendations(SBAR). Information from the following report(s) SBAR, Procedure Summary and Recent Results was reviewed with the receiving nurse. Lines:   Peripheral IV 02/20/18 Right Arm (Active)   Site Assessment Clean, dry, & intact 2/21/2018 11:34 AM   Phlebitis Assessment 0 2/21/2018 11:34 AM   Infiltration Assessment 0 2/21/2018 11:34 AM   Dressing Status Clean, dry, & intact 2/21/2018 11:34 AM   Dressing Type Transparent;Tape 2/21/2018 11:34 AM   Hub Color/Line Status Patent 2/21/2018 11:34 AM   Alcohol Cap Used No 2/21/2018 11:34 AM       Peripheral IV 02/21/18 Left Arm (Active)        Opportunity for questions and clarification was provided.       Patient transported with:   [employer]

## 2018-02-21 NOTE — PROGRESS NOTES
Verbal bedside report given to Jimmy Doe oncoming RN. Patient's situation, background, assessment and recommendations provided. Opportunity for questions provided. Oncoming RN assumed care of patient. R. Groin site visualized.

## 2018-02-21 NOTE — PROCEDURES
PRE-ELECTROPHYSIOLOGY STUDY DIAGNOSES  1. Atrial Fibrillation  2. Tachy/Ken syndrome  3. Previous device implant     PROCEDURE PERFORMED  1. AV Node ablation. 2. Leigh Ann-Procedure Device reprogramming     Anesthesia: General     Estimated Blood Loss: Less than 10 mL     Specimens: * No specimens in log *      PROCEDURE IN DETAIL: The patient was brought into the EP lab in a fasting  state. The right groin was prepped and draped in the usual  sterile fashion. Access was obtained in the right femoral vein via the  Seldinger technique over which an 8 -Italian sheath was placed. Through the 8-Italian sheath, a large curved 8 mm Blazer II ablation catheter was  placed. The AV node was mapped and ablated with 61 W of energy, 60 degree heat for  60 seconds. The patient was monitored for 10 minutes. The device was reprogrammed.        The sheaths were then pulled. Hemostasis was achieved.  The patient recovered from their sedation, transferred from the lab in a stable condition.     IMPRESSION: Successful catheter mapping ablation of AV node to form complete AV Block

## 2018-02-21 NOTE — PROGRESS NOTES
Bedside and Verbal shift change report given to Margaret Anderson RN (oncoming nurse) by self Mahesh silverman). Report included the following information SBAR, Kardex, MAR and Recent Results.

## 2018-02-21 NOTE — PROGRESS NOTES
Problem: Falls - Risk of  Goal: *Absence of Falls  Document Heather Fall Risk and appropriate interventions in the flowsheet.    Outcome: Progressing Towards Goal  Fall Risk Interventions:  Mobility Interventions: Assess mobility with egress test, Bed/chair exit alarm, Patient to call before getting OOB         Medication Interventions: Patient to call before getting OOB, Teach patient to arise slowly, Assess postural VS orthostatic hypotension, Bed/chair exit alarm    Elimination Interventions: Bed/chair exit alarm, Call light in reach, Patient to call for help with toileting needs, Urinal in reach

## 2018-02-21 NOTE — PROGRESS NOTES
[facility] CARDIOLOGY PROGRESS NOTE           2/21/2018 7:21 AM    Admit Date: 2/20/2018    Admit Diagnosis: afib/rvr;A-fib ([address]      Subjective:   Patient with ongoing A. Fib with RVR    Objective:     Vitals:    02/20/18 1941 02/20/18 2243 02/21/18 0027 02/21/18 0605   BP: 112/74 111/68 95/57 133/68   Pulse: (!) 133 (!) 123 83 (!) 106   Resp: 20 20 20   Temp: 98.1 °F (36.7 °C)  97.7 °F (36.5 °C) 97.8 °F (36.6 °C)   SpO2: 95%  96% 97%   Weight:    149 lb 11.2 oz (67.9 kg)   Height:           Physical Exam:  General-Well Developed, Well Nourished, No Acute Distress, Alert & Oriented x 3, appropriate mood. Neck- supple, no JVD  CV- irregular rate and rhythm no MRG  Lung- clear bilaterally  Abd- soft, nontender, nondistended  Ext- no edema bilaterally.   Skin- warm and dry    Current Facility-Administered Medications   Medication Dose Route Frequency    acetaminophen (TYLENOL) tablet 500 mg  500 mg Oral Q6H PRN    albuterol (PROVENTIL VENTOLIN) nebulizer solution 1.25 mg  1.25 mg Nebulization Q6H PRN    metoprolol tartrate (LOPRESSOR) tablet 25 mg  25 mg Oral BID WITH MEALS    montelukast (SINGULAIR) tablet 10 mg  10 mg Oral DAILY    nitroglycerin (NITROSTAT) tablet 0.4 mg  0.4 mg SubLINGual Q5MIN PRN    pantoprazole (PROTONIX) tablet 40 mg  40 mg Oral ACB    senna-docusate (PERICOLACE) 8.6-50 mg per tablet 1 Tab  1 Tab Oral DAILY    [START ON 2/23/2018] warfarin (COUMADIN) tablet 2.5 mg  2.5 mg Oral Once per day on Mon Fri    warfarin (COUMADIN) tablet 5 mg  5 mg Oral Once per day on Sun Tue Wed Thu Sat    sodium chloride (NS) flush 5-10 mL  5-10 mL IntraVENous Q8H    sodium chloride (NS) flush 5-10 mL  5-10 mL IntraVENous PRN    dilTIAZem (CARDIZEM) IR tablet 60 mg  60 mg Oral TIDAC    budesonide (PULMICORT) 500 mcg/2 ml nebulizer suspension  500 mcg Nebulization BID RT    And    albuterol CONCENTRATE 2.5mg/0.5 mL neb soln  2.5 mg Nebulization Q6H RT     Data Review:   Recent Results (from the past 24 hour(s))   EKG, 12 LEAD, INITIAL    Collection Time: 02/20/18  5:03 PM   Result Value Ref Range    Ventricular Rate 134 BPM    Atrial Rate 131 BPM    QRS Duration 106 ms    Q-T Interval 316 ms    QTC Calculation (Bezet) 471 ms    Calculated R Axis -45 degrees    Calculated T Axis 81 degrees    Diagnosis       Atrial fibrillation with rapid ventricular response  Left axis deviation  Moderate voltage criteria for LVH, may be normal variant  Abnormal ECG  When compared with ECG of 22-JAN-2018 14:11,  Nonspecific T wave abnormality, improved in Anterolateral leads  Confirmed by Aimee López MD (), ARSH KUMAR ([id number]) on 2/20/2018 6:08:26 PM     PLEASE READ & DOCUMENT PPD TEST IN 24 HRS    Collection Time: 02/20/18  5:58 PM   Result Value Ref Range    PPD Negative Negative    mm Induration 0 mm   PROTHROMBIN TIME + INR    Collection Time: 02/20/18  9:40 PM   Result Value Ref Range    Prothrombin time 27.4 (H) 11.5 - 14.5 sec    INR 2.6     METABOLIC PANEL, BASIC    Collection Time: 02/21/18  4:16 AM   Result Value Ref Range    Sodium 140 136 - 145 mmol/L    Potassium 4.4 3.5 - 5.1 mmol/L    Chloride 108 (H) 98 - 107 mmol/L    CO2 24 21 - 32 mmol/L    Anion gap 8 7 - 16 mmol/L    Glucose 114 (H) 65 - 100 mg/dL    BUN 27 (H) 8 - 23 MG/DL    Creatinine 1.11 0.8 - 1.5 MG/DL    GFR est AA >60 >60 ml/min/1.73m2    GFR est non-AA >60 >60 ml/min/1.73m2    Calcium 8.4 8.3 - 10.4 MG/DL   CBC WITH AUTOMATED DIFF    Collection Time: 02/21/18  4:16 AM   Result Value Ref Range    WBC 5.9 4.3 - 11.1 K/uL    RBC 3.96 (L) 4.23 - 5.67 M/uL    HGB 11.5 (L) 13.6 - 17.2 g/dL    HCT 34.6 (L) 41.1 - 50.3 %    MCV 87.4 79.6 - 97.8 FL    MCH 29.0 26.1 - 32.9 PG    MCHC 33.2 31.4 - 35.0 g/dL    RDW 13.4 11.9 - 14.6 %    PLATELET 148 668 - 091 K/uL    MPV 8.9 (L) 10.8 - 14.1 FL    DF AUTOMATED      NEUTROPHILS 73 43 - 78 %    LYMPHOCYTES 18 13 - 44 %    MONOCYTES 7 4.0 - 12.0 %    EOSINOPHILS 1 0.5 - 7.8 %    BASOPHILS 1 0.0 - 2.0 % IMMATURE GRANULOCYTES 0 0.0 - 5.0 %    ABS. NEUTROPHILS 4.4 1.7 - 8.2 K/UL    ABS. LYMPHOCYTES 1.1 0.5 - 4.6 K/UL    ABS. MONOCYTES 0.4 0.1 - 1.3 K/UL    ABS. EOSINOPHILS 0.0 0.0 - 0.8 K/UL    ABS. BASOPHILS 0.1 0.0 - 0.2 K/UL    ABS. IMM. GRANS. 0.0 0.0 - 0.5 K/UL   PROTHROMBIN TIME + INR    Collection Time: 02/21/18  4:16 AM   Result Value Ref Range    Prothrombin time 27.3 (H) 11.5 - 14.5 sec    INR 2.6       Assessment:     Principal Problem:    Permanent atrial fibrillation ([facility] (10/20/2015)      Overview: controlled    Active Problems:    COPD (chronic obstructive pulmonary disease) ([facility] (10/20/2015)      HTN (hypertension) (10/20/2015)      Chronic diastolic heart failure ([facility] (2/20/2018)      A-fib ([facility] (2/20/2018)      Plan:   1. A. Fib with RVR - Failed medical therapy - Plan for Av Node Ablation  2. Discharge Planning - Will need Rehab - SW consulted    Glenna Ramirez. Maribell Knight M.D., F.A.C.C, F.H.R.S.   Cardiology/Electrophysiology

## 2018-02-22 LAB
INR PPP: 2
MM INDURATION POC: 0 MM (ref 0–5)
PPD POC: NEGATIVE NEGATIVE
PROTHROMBIN TIME: 22.3 SEC (ref 11.5–14.5)

## 2018-02-22 PROCEDURE — 74011250637 HC RX REV CODE- 250/637: Performed by: INTERNAL MEDICINE

## 2018-02-22 PROCEDURE — 97165 OT EVAL LOW COMPLEX 30 MIN: CPT

## 2018-02-22 PROCEDURE — G8980 MOBILITY D/C STATUS: HCPCS

## 2018-02-22 PROCEDURE — 94640 AIRWAY INHALATION TREATMENT: CPT

## 2018-02-22 PROCEDURE — 74011250637 HC RX REV CODE- 250/637: Performed by: NURSE PRACTITIONER

## 2018-02-22 PROCEDURE — 36415 COLL VENOUS BLD VENIPUNCTURE: CPT | Performed by: INTERNAL MEDICINE

## 2018-02-22 PROCEDURE — 85610 PROTHROMBIN TIME: CPT | Performed by: INTERNAL MEDICINE

## 2018-02-22 PROCEDURE — G8987 SELF CARE CURRENT STATUS: HCPCS

## 2018-02-22 PROCEDURE — 74011000250 HC RX REV CODE- 250: Performed by: INTERNAL MEDICINE

## 2018-02-22 PROCEDURE — 94760 N-INVAS EAR/PLS OXIMETRY 1: CPT

## 2018-02-22 PROCEDURE — 97161 PT EVAL LOW COMPLEX 20 MIN: CPT

## 2018-02-22 PROCEDURE — 99218 HC RM OBSERVATION: CPT

## 2018-02-22 PROCEDURE — G8979 MOBILITY GOAL STATUS: HCPCS

## 2018-02-22 PROCEDURE — G8978 MOBILITY CURRENT STATUS: HCPCS

## 2018-02-22 PROCEDURE — G8989 SELF CARE D/C STATUS: HCPCS

## 2018-02-22 PROCEDURE — G8988 SELF CARE GOAL STATUS: HCPCS

## 2018-02-22 RX ADMIN — Medication 5 ML: at 05:41

## 2018-02-22 RX ADMIN — BUDESONIDE 500 MCG: 0.5 INHALANT RESPIRATORY (INHALATION) at 19:59

## 2018-02-22 RX ADMIN — ALBUTEROL SULFATE 2.5 MG: 2.5 SOLUTION RESPIRATORY (INHALATION) at 13:57

## 2018-02-22 RX ADMIN — METOPROLOL TARTRATE 25 MG: 25 TABLET ORAL at 08:03

## 2018-02-22 RX ADMIN — Medication 10 ML: at 13:47

## 2018-02-22 RX ADMIN — Medication 10 ML: at 13:48

## 2018-02-22 RX ADMIN — ALBUTEROL SULFATE 2.5 MG: 2.5 SOLUTION RESPIRATORY (INHALATION) at 19:59

## 2018-02-22 RX ADMIN — PANTOPRAZOLE SODIUM 40 MG: 40 TABLET, DELAYED RELEASE ORAL at 08:04

## 2018-02-22 RX ADMIN — BUDESONIDE 500 MCG: 0.5 INHALANT RESPIRATORY (INHALATION) at 08:37

## 2018-02-22 RX ADMIN — STANDARDIZED SENNA CONCENTRATE AND DOCUSATE SODIUM 1 TABLET: 8.6; 5 TABLET, FILM COATED ORAL at 08:04

## 2018-02-22 RX ADMIN — METOPROLOL TARTRATE 25 MG: 25 TABLET ORAL at 17:38

## 2018-02-22 RX ADMIN — OXYCODONE HYDROCHLORIDE AND ACETAMINOPHEN 1 TABLET: 5; 325 TABLET ORAL at 16:14

## 2018-02-22 RX ADMIN — Medication 10 ML: at 22:22

## 2018-02-22 RX ADMIN — WARFARIN SODIUM 5 MG: 5 TABLET ORAL at 17:38

## 2018-02-22 RX ADMIN — MONTELUKAST SODIUM 10 MG: 10 TABLET, FILM COATED ORAL at 22:19

## 2018-02-22 RX ADMIN — OXYCODONE HYDROCHLORIDE AND ACETAMINOPHEN 1 TABLET: 5; 325 TABLET ORAL at 22:19

## 2018-02-22 RX ADMIN — ALBUTEROL SULFATE 2.5 MG: 2.5 SOLUTION RESPIRATORY (INHALATION) at 08:37

## 2018-02-22 RX ADMIN — Medication 10 ML: at 22:21

## 2018-02-22 NOTE — PROGRESS NOTES
Patient's R. groin site oozing, manual pressure held for 15 minutes. No signs of beeding new 4x4 gauze and tegaderm with sandbag applied to site and will continue to monitor.

## 2018-02-22 NOTE — PROGRESS NOTES
Problem: Mobility Impaired (Adult and Pediatric)  Goal: *Acute Goals and Plan of Care (Insert Text)  LTG:  (1.)Mr. Clary Alcocer will move from supine to sit and sit to supine, scoot up and down and roll side to side in bed with modified INDEPENDENT within 7 day(s). (2.)Mr. Clary Alcocer will transfer from bed to chair and chair to bed with modified INDEPENDENT using the least restrictive device within 7 day(s). (3.)Mr. Clary Alcocer will ambulate with modified INDEPENDENCE for 300+ feet with the least restrictive device within 7 day(s). (4.)Mr. Clary Alcocer will perform standing static and dynamic balance activities x 8 minutes with SUPERVISION to improve safety within 7 day(s). (5.)Mr. Clary Alcocer will ascend and descend 4 stairs using one hand rail(s) with SUPERVISION to improve functional mobility and safety within 7 day(s). PHYSICAL THERAPY: Initial Assessment, PM 2/22/2018  OBSERVATION: Hospital Day: 3  Payor: Alivia Jaramillo / Plan: [id number] PPO/PFFS / Product Type: [employer] Medicare /    Pacer Precautions  NAME/AGE/GENDER: Brandt Montana is a 76 y.o. male   PRIMARY DIAGNOSIS: afib/rvr  A-fib ([address]  Atrial fibrillation ([address]  A-FIB Permanent atrial fibrillation (HCC) Permanent atrial fibrillation (HCC)  Procedure(s) (LRB):  ABLATION OF ATRIAL FIBRILLATION (N/A)  1 Day Post-Op  ICD-10: Treatment Diagnosis:   · Other abnormalities of gait and mobility (R26.89)   Precaution/Allergies:  Levaquin [levofloxacin] and Prednisone      ASSESSMENT:     Mr. Clary Alcocer presents supine in bed and agreeable for PT assessment. Per patient he had a PM placed about a month ago, instructed patient to continue to follow PM precautions until cleared by cardiology. Patient transitioned to sit with with CGA. He stood with CGA and ambulated 150' with RW and slow, shuffling gait. At baseline patient ambulates with cane independently in home and community.   Per patient he has been living with niece and receiving help with bathing, has not returned to his baseline since previous hospital admission. He would like to be more independent and live on his own. Mr. Rhona Quintero would benefit from skilled physical therapy (medically necessary) to address his deficits and maximize his function. Mr. Rhona Quintero was discharged from our facility before further treatment could be provided in this setting. This section established at most recent assessment   PROBLEM LIST (Impairments causing functional limitations):  1. Decreased ADL/Functional Activities  2. Decreased Transfer Abilities  3. Decreased Ambulation Ability/Technique  4. Decreased Balance  5. Decreased Activity Tolerance   INTERVENTIONS PLANNED: (Benefits and precautions of physical therapy have been discussed with the patient.)  1. Balance Exercise  2. Bed Mobility  3. Family Education  4. Therapeutic Activites  5. Therapeutic Exercise/Strengthening  6. Transfer Training  7. education  8. Group Therapy     TREATMENT PLAN: Frequency/Duration: 3 times a week for duration of hospital stay  Rehabilitation Potential For Stated Goals: Excellent     RECOMMENDED REHABILITATION/EQUIPMENT: (at time of discharge pending progress): Due to the probability of continued deficits (see above) this patient will likely need continued skilled physical therapy after discharge. Equipment:    None at this time              HISTORY:   History of Present Injury/Illness (Reason for Referral):  Per MD note, Isael Amaya is a 76 y.o. male with known hx of DHF, chronic afib, recent placement of [facility] PM by Dr. Sruthi Chaney in January. He was actually scheduled to come for AV node ablation but developed worsening SOB last week and presented to [facility] on 2/17. He was admitted for pulmonary edema and afib with RVR. He was diuresed but afib remains rapid. He has had mild hypotension. He is now clinically much improved, euvolemic and without complaints. Plan for AV node ablation this admission now.  He denies cp, orthopnea on PND at present. Prior cath and nuclear stress test unremarkable with documented preserved EF by last echo in 2017. Review of records from [facility] notes therapeutic INR, normal bmp, mild anemia 10/30. \"  Past Medical History/Comorbidities:   Mr. Clary Alcocer  has a past medical history of Chronic obstructive pulmonary disease ([facility] Dyspnea (7/21/2016); Gastrointestinal disorder; and Heart failure ([facility] [address] [facility] Mr. Clary Alcocer  has a past surgical history that includes hx orthopaedic and hx gi. Social History/Living Environment:   Home Environment: Private residence  One/Two Story Residence: One story  Living Alone: No  Support Systems: Family member(s)  Patient Expects to be Discharged to[de-identified] Private residence  Current DME Used/Available at Home: sharee Pollack Kathye Danish, Erick Melgoza, [address] bed  Prior Level of Function/Work/Activity:  Lives at home with niece and her . Ambulates with cane independently in home and community. Sleeps in hospital bed. Modified independent. Number of Personal Factors/Comorbidities that affect the Plan of Care: 1-2: MODERATE COMPLEXITY   EXAMINATION:   Most Recent Physical Functioning:   Gross Assessment:  AROM: Generally decreased, functional  Strength: Generally decreased, functional  Coordination: Generally decreased, functional               Posture:  Posture (WDL): Exceptions to WDL  Posture Assessment:  Forward head, Rounded shoulders  Balance:  Sitting: Intact  Standing: Impaired  Standing - Static: Good  Standing - Dynamic : Fair Bed Mobility:  Supine to Sit: Contact guard assistance  Scooting: Contact guard assistance  Wheelchair Mobility:     Transfers:  Sit to Stand: Contact guard assistance  Stand to Sit: Contact guard assistance  Bed to Chair: Contact guard assistance  Gait:     Speed/[place]: Slow;Shuffled  Step Length: Right shortened;Left shortened  Distance (ft): 150 Feet (ft)  Assistive Device: Gait belt;Walker, rolling  Ambulation - Level of Assistance: Contact guard assistance      Body Structures Involved:  1. Heart  2. Muscles Body Functions Affected:  1. Cardio  2. Movement Related Activities and Participation Affected:  1. Mobility  2. Self Care  3. Domestic Life  4. Community, Social and [place]   Number of elements that affect the Plan of Care: 4+: HIGH COMPLEXITY   CLINICAL PRESENTATION:   Presentation: Evolving clinical presentation with changing clinical characteristics: MODERATE COMPLEXITY   CLINICAL DECISION MAKIN [place] Mobility Inpatient Short Form  How much difficulty does the patient currently have. .. Unable A Lot A Little None   1. Turning over in bed (including adjusting bedclothes, sheets and blankets)? [] 1   [] 2   [x] 3   [] 4   2. Sitting down on and standing up from a chair with arms ( e.g., wheelchair, bedside commode, etc.)   [] 1   [] 2   [x] 3   [] 4   3. Moving from lying on back to sitting on the side of the bed? [] 1   [] 2   [x] 3   [] 4   How much help from another person does the patient currently need. .. Total A Lot A Little None   4. Moving to and from a bed to a chair (including a wheelchair)? [] 1   [] 2   [x] 3   [] 4   5. Need to walk in hospital room? [] 1   [] 2   [x] 3   [] 4   6. Climbing 3-5 steps with a railing? [] 1   [] 2   [x] 3   [] 4   © , Trustees of [address] [place] [address], under license to [employer]. All rights reserved      Score:  Initial: 18 Most Recent: 18(Date: -- )    Interpretation of Tool:  Represents activities that are increasingly more difficult (i.e. Bed mobility, Transfers, Gait). Score 24 23 22-20 19-15 14-10 9-7 6     Modifier CH CI CJ CK CL CM CN      ?  Mobility - Walking and Moving Around:     - CURRENT STATUS: CK - 40%-59% impaired, limited or restricted    - GOAL STATUS: CJ - 20%-39% impaired, limited or restricted    - D/C STATUS:  CK - 40%-59% impaired, limited or restricted  Payor: [employer] / Plan: [facility] [employer] MEDICARE CHOICE PPO/PFFS / Product Type: Managed Care Medicare /      Medical Necessity:     · Patient is expected to demonstrate progress in strength, range of motion, balance, coordination and functional technique to increase independence with   and improve safety during all functional mobility. Reason for Services/Other Comments:  · Patient continues to require skilled intervention due to medical complications and recent decline in functional mobility. Use of outcome tool(s) and clinical judgement create a POC that gives a: Clear prediction of patient's progress: LOW COMPLEXITY            TREATMENT:   (In addition to Assessment/Re-Assessment sessions the following treatments were rendered)   Pre-treatment Symptoms/Complaints:    Pain: Initial:   Pain Intensity 1: 0  Post Session:  Unchanged. Assessment/Reassessment only, no treatment provided today    Braces/Orthotics/Lines/Etc:   · Eagle monitor  · O2 Device: Room air  Treatment/Session Assessment:    · Response to Treatment:  Grateful. · Interdisciplinary Collaboration:   o Physical Therapist  o Registered Nurse  o Certified Nursing Assistant/Patient Care Technician  · After treatment position/precautions:   o Up in chair  o Bed/Chair-wheels locked  o Call light within reach  o RN notified  o Nurse at bedside   · Compliance with Program/Exercises: compliant all of the time. · Recommendations/Intent for next treatment session: \"Next visit will focus on advancements to more challenging activities and reduction in assistance provided\".   Total Treatment Duration:  PT Patient Time In/Time Out  Time In: 1320  Time Out: [address], PT, DPT

## 2018-02-22 NOTE — PROGRESS NOTES
Verbal bedside report given to [place], oncoming RN. Patient's situation, background, assessment and recommendations provided. Opportunity for questions provided. Oncoming RN assumed care of patient.

## 2018-02-22 NOTE — PROGRESS NOTES
Shift change, with bedside reporting completed. Reinforced Safety precautions: Call for assistance prior to activity, and use of nonskid socks before getting out of bed (OOB). Verbalized understanding of safety instructions. Hand held call-light within reach. Teaching, informed to remain on bedrest until 2130 hr. Keep RLE flat on mattress, no bending or lifting off mattress, voiced understanding.

## 2018-02-22 NOTE — PROGRESS NOTES
Noted [facility] had requested to start a precert with [facility] health and rehab so will follow up with them to see if bed available and pre-cert started. Patient previously at [facility] in [facility]. CM following. Addendum Spoke with [facility] admission coordinator at [facility] and they have started pre-cert for patient from [facility] admission. Will need updated notes from PT/OT once seen today. Will send notes when available. CM [facility] by [employer].

## 2018-02-22 NOTE — PROGRESS NOTES
PT Note:  PT orders received and chart review initiated. Attempted evaluation, however, per RN patient on bedrest due to groin site oozing. Will attempt another time/day as schedule permits.   Nicolette Hutchinson, PT, DPT

## 2018-02-22 NOTE — PROGRESS NOTES
Problem: Self Care Deficits Care Plan (Adult)  Goal: *Acute Goals and Plan of Care (Insert Text)  1. Patient will complete total body bathing and dressing with supervision and adaptive equipment as needed. 2. Patient will complete toileting with supervision. 3. Patient will tolerate 30 minutes of OT treatment with 1-2 rest breaks to increase activity tolerance for ADLs. 4. Patient will complete functional transfers with modified independence and adaptive equipment as needed. 5. Patient will dynamic standing balance with ADL tasks with supervision to demonstrate decreased risk for falls. Timeframe: 7 visits       OCCUPATIONAL THERAPY: Initial Assessment and PM 2/22/2018  OBSERVATION: Hospital Day: 3  Payor: Rayshawn Castillo / Plan: [id number] PPO/PFFS / Product Type: [employer] Medicare /      NAME/AGE/GENDER: Tanya Shah is a 76 y.o. male   PRIMARY DIAGNOSIS:  afib/rvr  A-fib ([address]  Atrial fibrillation ([address]  A-FIB Permanent atrial fibrillation (HCC) Permanent atrial fibrillation (HCC)  Procedure(s) (LRB):  ABLATION OF ATRIAL FIBRILLATION (N/A)  1 Day Post-Op  ICD-10: Treatment Diagnosis:    · Generalized Muscle Weakness (M62.81)  · Other lack of cordination (R27.8)   Precautions/Allergies:     Levaquin [levofloxacin] and Prednisone      ASSESSMENT:     Mr. Martina Rios presents to the hospital with a-fib and is s/p ablation. Pt is very pleasant and sitting up in the chair upon arrival. Pt is oriented to person, place, and time. Pt states that he has been getting some home health nursing, aide, and therapy at the house. Pt lives with niece and her family but states \"I can tell they don't want me there. \" Pt states he is looking at getting an apartment at [facility]. \" Pt verbalizes that at home he was cooking some meals but burning the pans due to falling asleep. Pt states he manages his own medications but does appear to have some memory deficits.  Pt able to recall 1/3 words with 3-5 minute distraction. Pt demonstrated some limitations today during evaluation with donning/doffing socks due to pain along the surgical site. Pt also reports recent pacemaker placement with lifting restrictions. Pt able to stand from chair with CGA and complete functional mobility into the hallway with additional time and taking small steps. Pt reports that ideally he would like to become more independent with ADL/functional mobility. Pt states he is hoping he can go to rehab because he has \"no where else to go. \" Pt is currently functioning below baseline for ADL/functional transfers and will benefit from OT services to address stated goals and plan of care. This section established at most recent assessment   PROBLEM LIST (Impairments causing functional limitations):  1. Decreased Strength  2. Decreased ADL/Functional Activities  3. Decreased Transfer Abilities  4. Decreased Ambulation Ability/Technique  5. Decreased Balance  6. Increased Pain  7. Decreased Activity Tolerance  8. Increased Fatigue  9. Decreased Flexibility/Joint Mobility  10. Decreased [place] with Home Exercise Program  11. Decreased Cognition   INTERVENTIONS PLANNED: (Benefits and precautions of occupational therapy have been discussed with the patient.)  1. Activities of daily living training  2. Adaptive equipment training  3. Balance training  4. Clothing management  5. Cognitive training  6. Donning&doffing training  7. Group therapy  8. Neuromuscular re-eduation  9. Therapeutic activity  10. Therapeutic exercise     TREATMENT PLAN: Frequency/Duration: Follow patient 3 times per week to address above goals. Rehabilitation Potential For Stated Goals: Good     RECOMMENDED REHABILITATION/EQUIPMENT: (at time of discharge pending progress): Due to the probability of continued deficits (see above) this patient will likely need continued skilled occupational therapy after discharge.   Equipment:    tbd              OCCUPATIONAL PROFILE AND HISTORY:   History of Present Injury/Illness (Reason for Referral):  See H&P  Past Medical History/Comorbidities:   Mr. Catalino Strong  has a past medical history of Chronic obstructive pulmonary disease ([facility] [address] Dyspnea (7/21/2016); Gastrointestinal disorder; and Heart failure ([facility] [address] Mr. Catalino Strong  has a past surgical history that includes hx orthopaedic and hx gi. Social History/Living Environment:   Home Environment: Private residence  One/Two Story Residence: One story  Living Alone: No  Support Systems: Family member(s)  Patient Expects to be Discharged to[de-identified] Private residence  Current DME Used/Available at Home: Stef Ditch, straight, Commode, bedside, Walker, rollator, Walker, rolling  Tub or Shower Type: Tub/Shower combination  Prior Level of Function/Work/Activity:  Pt lives at home with niece and her spouse/children/grandchildren. Pt states he uses a cane for functional mobility. Pt has home health aide that comes 2x/week along with [employer] and [employer]. Pt reports no falls at home. Pt is needing some assistance with ADLs. Pt completes his own toileting, but uses urinal at times due to urgency. States he hasn't been driving lately but was driving previously. Personal Factors:          Past/Current Experience:  Hx of multiple surgeries (ablation/pacemeker placement); followed by [facility]        Social Background: Lives with niece but states it's not a good situation and he doesn't want to go back        Other factors that influence how disability is experienced by the patient:  Multiple co-morbidities    Number of Personal Factors/Comorbidities that affect the Plan of Care: Extensive review of physical, cognitive, and psychosocial performance (3+):  HIGH COMPLEXITY   ASSESSMENT OF OCCUPATIONAL PERFORMANCE[de-identified]   Activities of Daily Living:           Basic ADLs (From Assessment) Complex ADLs (From Assessment)   Basic ADL  Feeding: Supervision  Oral Facial Hygiene/Grooming: Minimum assistance  Bathing:  Moderate assistance  Upper Body Dressing: Minimum assistance  Lower Body Dressing: Moderate assistance  Toileting: Moderate assistance Instrumental ADL  Meal Preparation: Maximum assistance  Homemaking: Total assistance   Grooming/Bathing/Dressing Activities of Daily Living     Cognitive Retraining  Safety/Judgement: Awareness of environment                 Functional Transfers  Toilet Transfer : Minimum assistance  Tub Transfer: Moderate assistance  Shower Transfer: Minimum assistance     Bed/Mat Mobility  Supine to Sit: Contact guard assistance  Sit to Stand: Contact guard assistance  Bed to Chair: Contact guard assistance  Scooting: Supervision       Most Recent Physical Functioning:   Gross Assessment:  AROM: Generally decreased, functional (L side limited with recent pacemaker placment)  Strength: Generally decreased, functional (B UE (limited due to precautions of no lifting with LUE)  Coordination: Generally decreased, functional               Posture:  Posture (WDL): Exceptions to WDL  Posture Assessment: Forward head, Rounded shoulders  Balance:  Sitting: Intact  Standing: Impaired  Standing - Static: Good  Standing - Dynamic : Fair Bed Mobility:  Supine to Sit: Contact guard assistance  Scooting: Supervision  Wheelchair Mobility:     Transfers:  Sit to Stand: Contact guard assistance  Stand to Sit: Contact guard assistance  Bed to Chair: Contact guard assistance              Patient Vitals for the past 6 hrs:   BP SpO2 Pulse   02/22/18 1246 124/75 99 % 75   02/22/18 1358 - 100 % -       Mental Status  Neurologic State: Alert  Orientation Level: Oriented to person, Oriented to place, Oriented to time  Cognition: Follows commands, Memory loss  Perception: Appears intact  Perseveration: No perseveration noted  Safety/Judgement: Awareness of environment                          Physical Skills Involved:  1. Balance  2. Strength  3. Activity Tolerance  4.  Pain (acute) Cognitive Skills Affected (resulting in the inability to perform in a timely and safe manner):  1. Executive Function  2. Short Term Recall  3. Divided Attention Psychosocial Skills Affected:  1. Habits/Routines  2. Environmental Adaptation  3. Self-Awareness   Number of elements that affect the Plan of Care: 5+:  HIGH COMPLEXITY   CLINICAL DECISION MAKING:   [facility] AM-PAC 6 Clicks   Daily Activity Inpatient Short Form  How much help from another person does the patient currently need. .. Total A Lot A Little None   1. Putting on and taking off regular lower body clothing? [] 1   [x] 2   [] 3   [] 4   2. Bathing (including washing, rinsing, drying)? [] 1   [x] 2   [] 3   [] 4   3. Toileting, which includes using toilet, bedpan or urinal?   [] 1   [x] 2   [] 3   [] 4   4. Putting on and taking off regular upper body clothing? [] 1   [] 2   [x] 3   [] 4   5. Taking care of personal grooming such as brushing teeth? [] 1   [] 2   [x] 3   [] 4   6. Eating meals? [] 1   [] 2   [x] 3   [] 4   © 2007, Trustees of [facility], under license to [employer]. All rights reserved      Score:  Initial: 15 Most Recent: X (Date: -- )    Interpretation of Tool:  Represents activities that are increasingly more difficult (i.e. Bed mobility, Transfers, Gait). Score 24 23 22-20 19-15 14-10 9-7 6     Modifier CH CI CJ CK CL CM CN      ? Self Care:     - CURRENT STATUS: CK - 40%-59% impaired, limited or restricted    - GOAL STATUS: CJ - 20%-39% impaired, limited or restricted    - D/C STATUS:  CK - 40%-59% impaired, limited or restricted  Payor: [employer] / Plan: [id number] PPO/PFFS / Product Type: [facility] Medicare /     Mr. Victoria Peters was discharged from our facility before further treatment could be provided in this setting. Medical Necessity:     · Patient demonstrates good rehab potential due to higher previous functional level.   Reason for Services/Other Comments:  · Patient continues to require skilled intervention due to decreased independence with ADL/functional transfers. Use of outcome tool(s) and clinical judgement create a POC that gives a: LOW COMPLEXITY         TREATMENT:   (In addition to Assessment/Re-Assessment sessions the following treatments were rendered)     Pre-treatment Symptoms/Complaints:    Pain: Initial:   Pain Intensity 1: 0  Post Session:  0     Assessment/Reassessment only, no treatment provided today    Braces/Orthotics/Lines/Etc:   · O2 Device: Room air  Treatment/Session Assessment:    · Response to Treatment:  Evaluation only. · Interdisciplinary Collaboration:   o Occupational Therapist  o Registered Nurse  · After treatment position/precautions:   o Up in chair  o Bed/Chair-wheels locked  o Call light within reach  o RN notified   · Compliance with Program/Exercises: Will assess as treatment progresses. · Recommendations/Intent for next treatment session: \"Next visit will focus on advancements to more challenging activities and reduction in assistance provided\".   Total Treatment Duration:  OT Patient Time In/Time Out  Time In: 1439  Time Out: [address], OT

## 2018-02-23 ENCOUNTER — APPOINTMENT (OUTPATIENT)
Dept: ULTRASOUND IMAGING | Age: 78
End: 2018-02-23
Attending: PHYSICIAN ASSISTANT
Payer: MEDICARE

## 2018-02-23 VITALS
RESPIRATION RATE: 18 BRPM | TEMPERATURE: 97.7 F | BODY MASS INDEX: 23.45 KG/M2 | HEART RATE: 76 BPM | SYSTOLIC BLOOD PRESSURE: 134 MMHG | HEIGHT: 67 IN | WEIGHT: 149.4 LBS | DIASTOLIC BLOOD PRESSURE: 77 MMHG | OXYGEN SATURATION: 97 %

## 2018-02-23 PROCEDURE — 93978 VASCULAR STUDY: CPT

## 2018-02-23 PROCEDURE — 74011250637 HC RX REV CODE- 250/637: Performed by: NURSE PRACTITIONER

## 2018-02-23 PROCEDURE — 99218 HC RM OBSERVATION: CPT

## 2018-02-23 PROCEDURE — 94760 N-INVAS EAR/PLS OXIMETRY 1: CPT

## 2018-02-23 PROCEDURE — 94640 AIRWAY INHALATION TREATMENT: CPT

## 2018-02-23 PROCEDURE — 74011000250 HC RX REV CODE- 250: Performed by: INTERNAL MEDICINE

## 2018-02-23 RX ORDER — WARFARIN SODIUM 5 MG/1
5 TABLET ORAL
Qty: 60 TAB | Refills: 1 | Status: ON HOLD
Start: 2018-02-26 | End: 2018-03-02

## 2018-02-23 RX ADMIN — Medication 10 ML: at 05:40

## 2018-02-23 RX ADMIN — ALBUTEROL SULFATE 2.5 MG: 2.5 SOLUTION RESPIRATORY (INHALATION) at 03:22

## 2018-02-23 RX ADMIN — PANTOPRAZOLE SODIUM 40 MG: 40 TABLET, DELAYED RELEASE ORAL at 08:09

## 2018-02-23 RX ADMIN — METOPROLOL TARTRATE 25 MG: 25 TABLET ORAL at 08:09

## 2018-02-23 RX ADMIN — STANDARDIZED SENNA CONCENTRATE AND DOCUSATE SODIUM 1 TABLET: 8.6; 5 TABLET, FILM COATED ORAL at 08:09

## 2018-02-23 RX ADMIN — ALBUTEROL SULFATE 2.5 MG: 2.5 SOLUTION RESPIRATORY (INHALATION) at 07:35

## 2018-02-23 RX ADMIN — BUDESONIDE 500 MCG: 0.5 INHALANT RESPIRATORY (INHALATION) at 07:35

## 2018-02-23 NOTE — PROGRESS NOTES
Patient is to be transported at 1230 to [facility] and rehab. AVS provided to  for transport.

## 2018-02-23 NOTE — PROGRESS NOTES
[facility] CARDIOLOGY PROGRESS NOTE           2/23/2018 7:10 AM    Admit Date: 2/20/2018    Admit Diagnosis: afib/rvr;A-fib ([address] Atrial fibrillation (HCC);A-FIB      Subjective:   No complaints this AM, no chest pain or shortness of breath    Interval History: (History of pertinent interval events obtained from nursing staff)  No events overnight    ROS:  GEN:  No fever or chills  Cardiovascular:  As noted above  Pulmonary:  As noted above  Neuro:  No new focal motor or sensory loss      Objective:     Vitals:    02/22/18 2041 02/23/18 0016 02/23/18 0323 02/23/18 0617   BP: 116/76 133/84  143/81   Pulse: 77 78  75   Resp: 18 18 18   Temp: 98.2 °F (36.8 °C) 97.4 °F (36.3 °C)  97.7 °F (36.5 °C)   SpO2: 98% 99% 99% 97%   Weight:    67.8 kg (149 lb 6.4 oz)   Height:           Physical Exam:  General-Well Developed, Well Nourished, No Acute Distress, Alert & Oriented x 3, appropriate mood. Neck- supple, no JVD  CV- regular rate, paced rhythm no MRG  Lung- clear bilaterally  Abd- soft, nontender, nondistended  Ext- no edema bilaterally.   Skin- warm and dry    Current Facility-Administered Medications   Medication Dose Route Frequency    sodium chloride (NS) flush 5-10 mL  5-10 mL IntraVENous Q8H    sodium chloride (NS) flush 5-10 mL  5-10 mL IntraVENous PRN    acetaminophen (TYLENOL) tablet 650 mg  650 mg Oral Q4H PRN    oxyCODONE-acetaminophen (PERCOCET) 5-325 mg per tablet 1 Tab  1 Tab Oral Q4H PRN    warfarin (COUMADIN) tablet 2.5 mg  2.5 mg Oral Once per day on Mon Wed Fri    warfarin (COUMADIN) tablet 5 mg  5 mg Oral Once per day on Sun Tue Thu Sat    acetaminophen (TYLENOL) tablet 500 mg  500 mg Oral Q6H PRN    albuterol (PROVENTIL VENTOLIN) nebulizer solution 1.25 mg  1.25 mg Nebulization Q6H PRN    metoprolol tartrate (LOPRESSOR) tablet 25 mg  25 mg Oral BID WITH MEALS    montelukast (SINGULAIR) tablet 10 mg  10 mg Oral DAILY    nitroglycerin (NITROSTAT) tablet 0.4 mg  0.4 mg SubLINGual Q5MIN PRN    pantoprazole (PROTONIX) tablet 40 mg  40 mg Oral ACB    senna-docusate (PERICOLACE) 8.6-50 mg per tablet 1 Tab  1 Tab Oral DAILY    sodium chloride (NS) flush 5-10 mL  5-10 mL IntraVENous Q8H    sodium chloride (NS) flush 5-10 mL  5-10 mL IntraVENous PRN    budesonide (PULMICORT) 500 mcg/2 ml nebulizer suspension  500 mcg Nebulization BID RT    And    albuterol CONCENTRATE 2.5mg/0.5 mL neb soln  2.5 mg Nebulization Q6H RT     Data Review:   Recent Results (from the past 24 hour(s))   PLEASE READ & DOCUMENT PPD TEST IN 72 HRS    Collection Time: 02/22/18  5:00 PM   Result Value Ref Range    PPD Negative Negative    mm Induration 0 mm       EKG:  (EKG has been independently visualized by me with interpretation below)  Assessment:     Principal Problem:    Permanent atrial fibrillation ([address] (10/20/2015)      Overview: controlled    Active Problems:    COPD (chronic obstructive pulmonary disease) ([address] (10/20/2015)      HTN (hypertension) (10/20/2015)      Chronic diastolic heart failure (HCC) (2/20/2018)      A-fib ([address] (2/20/2018)      Atrial fibrillation ([address] (2/21/2018)      Plan:        1. A. Fib with RVR - Failed medical therapy - Post Av Node Ablation. cont Coumadin, INR 2.0  2. Discharge Planning - Will need Rehab/placement - SW consulted      [facility].  Maurice DENNISON  Cardiology/Electrophysiology

## 2018-02-23 NOTE — PROGRESS NOTES
Verbal bedside report received from Ida Gleason, [facility]. Assumed care of patient.

## 2018-02-23 NOTE — PROGRESS NOTES
Problem: Falls - Risk of  Goal: *Absence of Falls  Document Heather Fall Risk and appropriate interventions in the flowsheet. Outcome: Progressing Towards Goal  Pt progressing towards goal. No falls since admission. Bed low and locked. Call light within reach. Side rails x 2. Gripper socks applied. Personal belongings within reach. Pt verbalizes understanding to call for assistance.      Fall Risk Interventions:  Mobility Interventions: Bed/chair exit alarm         Medication Interventions: Patient to call before getting OOB    Elimination Interventions: Call light in reach    History of Falls Interventions: Door open when patient unattended

## 2018-02-23 NOTE — DISCHARGE INSTRUCTIONS
Learning About Fluid Overload  What is fluid overload? Fluid overload means that your body has too much water. The extra fluid in your body can raise your blood pressure and force your heart to work harder. It can also make it hard for you to breathe. Most of your body is made up of water. The body uses minerals like sodium and potassium to help organs such as your heart, kidneys, and liver balance how much water you need. For example, the heart pumps blood to move water around the body. And the kidneys work to get rid of the water that the body doesn't need. Health conditions like kidney disease, heart failure, and cirrhosis can cause fluid overload. Other things can cause extra fluid to build up. IV fluids, some medicines, too much salt (sodium) from food, and certain medical treatments can sometimes cause this fluid increase. What are the symptoms? Some of the most common symptoms are:  · Gaining weight over a short period of time. · Swelling in the ankles or legs. · Shortness of breath. How is it treated? The goal of treatment is to remove the extra fluid in your body. Your treatment will depend on the cause. Your doctor may:  · Give you medicines, such as diuretics (also called \"water pills\"). They help your body get rid of the extra fluid. · Restrict your fluid or salt intake. Follow-up care is a key part of your treatment and safety. Be sure to make and go to all appointments, and call your doctor if you are having problems. It's also a good idea to know your test results and keep a list of the medicines you take. Where can you learn more? Go to http://mary-les.info/. Enter O110 in the search box to learn more about \"Learning About Fluid Overload. \"  Current as of: September 21, 2016  Content Version: 11.4  © 3652-0641 [employer].  Care instructions adapted under license by [employer] (which disclaims liability or warranty for this information). If you have questions about a medical condition or this instruction, always ask your healthcare professional. [address] any warranty or liability for your use of this information. Electrophysiology Study and Catheter Ablation: What to Expect at [address] had an electrophysiology study for a problem with your heartbeat. You may also have had a catheter ablation to try to correct the problem. You may have swelling, bruising, or a small lump around the site where the catheters went into your body. These should go away in 3 to 4 weeks. Do not exercise hard or lift anything heavy for a week. You may be able to go back to work and to your normal routine in 1 or 2 days. This care sheet gives you a general idea about how long it will take for you to recover. But each person recovers at a different pace. Follow the steps below to get better as quickly as possible. How can you care for yourself at home? Activity  ? · For 1 week, do not lift anything that would make you strain. This may include heavy grocery bags and milk containers, a heavy briefcase or backpack, cat litter or dog food bags, a vacuum , or a child. ? · For 1 week, do not exercise hard or do any activity that could strain your blood vessels or the site where the catheters went into your body. ? · Ask your doctor when it is okay to have sex. ? · You may shower 24 to 48 hours after the procedure, if your doctor okays it. Pat the incision dry. Do not take a bath for 1 week, or until your doctor tells you it isokay. Diet  ? · You can eat your normal diet. If your stomach is upset, try bland, low-fat foods like plain rice, broiled chicken, toast, and yogurt. ? · Drink plenty of fluids (unless your doctor tells you not to). Medicines  ? · Your doctor will tell you if and when you can restart your medicines. He or she will also give you instructions about taking any new medicines.    ? · If you take blood thinners, such as warfarin (Coumadin), clopidogrel (Plavix), or aspirin, be sure to talk to your doctor. He or she will tell you if and when to start taking those medicines again. Make sure that you understand exactly what your doctor wants you to do. ? · Ask your doctor if you can take acetaminophen (Tylenol) for pain. Do not take aspirin for 3 days, unless your doctor says it is okay. ? · Check with your doctor before you take aspirin or anti-inflammatory medicines to reduce pain and swelling. These include ibuprofen (Advil, Motrin) and naproxen (Aleve). ? · Make sure you know which heart medicines to continue and which ones to stop. Ask your doctor if you are not sure. ?Catheter site care  ? · You can remove your bandages the day after the procedure. Follow-up care is a key part of your treatment and safety. Be sure to make and go to all appointments, and call your doctor if you are having problems. It's also a good idea to know your test results and keep a list of the medicines you take. When should you call for help? Call 911 anytime you think you may need emergency care. For example, call if:  ? · You passed out (lost consciousness). ? · You have symptoms of a heart attack. These may include:  ¨ Chest pain or pressure, or a strange feeling in the chest.  ¨ Sweating. ¨ Shortness of breath. ¨ Nausea or vomiting. ¨ Pain, pressure, or a strange feeling in the back, neck, jaw, or upper belly, or in one or both shoulders or arms. ¨ Lightheadedness or sudden weakness. ¨ A fast or irregular heartbeat. After you call 911, the  may tell you to chew 1 adult-strength or 2 to 4 low-dose aspirin. Wait for an ambulance. Do not try to drive yourself. ? · You have symptoms of a stroke. These may include:  ¨ Sudden numbness, tingling, weakness, or loss of movement in your face, arm, or leg, especially on jose side of your body. ¨ Sudden vision changes.   ¨ Sudden trouble speaking. ¨ Sudden confusion or trouble understanding simple statements. ¨ Sudden problems with walking or balance. ¨ A sudden, severe headache that is different from past headaches. ?Call your doctor now or seek immediate medical care if:  ? · You are bleeding from the area where the catheter was put in your artery. ? · You have a fast-growing, painful lump at the catheter site. ? · You have signs of infection, such as:  ¨ Increased pain, swelling, warmth, or redness. ¨ Red streaks leading from the catheter site. ¨ Pus draining from the catheter site. ¨ A fever. ? · Your leg or arm looks blue or feels cold, numb, or tingly. ? Watch closely for any changes in your health, and be sure to contact your doctor if you have any problems. Where can you learn more? Go to http://mary-les.info/. Enter [phone] in the search box to learn more about \"Electrophysiology Study and Catheter Ablation: What to Expect at Home. \"  Current as of: September 21, 2016  Content Version: 11.4  © 7013-5496 [employer]. Care instructions adapted under license by [employer] (which disclaims liability or warranty for this information). If you have questions about a medical condition or this instruction, always ask your healthcare professional. [address] any warranty or liability for your use of this information. Atrial Flutter: Care Instructions  Your Care Instructions    Atrial flutter is a type of heartbeat problem (arrhythmia) that usually causes a fast heart rate. In atrial flutter, a problem with the heart's electrical system causes the two upper parts of the heart (the right atrium and the left atrium) to flutter, or beat very fast. Atrial flutter might be diagnosed using an an electrocardiogram (EKG). An EKG translates the heart's electrical activity into line tracings on paper. Treating atrial flutter is important for several reasons.  The change in heartbeat can cause blood clots. The clots can travel from your heart to your brain and cause a stroke. A fast heartbeat can make you feel lightheaded, dizzy, and weak. And over time, it can also increase your risk for heart failure. Atrial flutter is often the result of another heart condition, such as coronary artery disease or some other heart rhythm problems. Making changes to improve your heart health will help you stay healthy and active. Your doctor may prescribe medicines to help slow down your heartbeat. You may also take medicine to help prevent a stroke. In some cases, a procedure called catheter ablation is done to stop atrial flutter. Follow-up care is a key part of your treatment and safety. Be sure to make and go to all appointments, and call your doctor if you are having problems. It's also a good idea to know your test results and keep a list of the medicines you take. How can you care for yourself at home? Medicines  ? · Take your medicines exactly as prescribed. Call your doctor if you think you are having a problem with your medicine. You will get more details on the specific medicines your doctor prescribes. ? · If your doctor has given you a blood thinner to prevent a stroke, be sure you get instructions about how to take your medicine safely. Blood thinners can cause serious bleeding problems. ? · Do not take any vitamins, over-the-counter drugs, or herbal products without talking to your doctor first.   ? Lifestyle changes  ? · Do not smoke. Smoking can increase your chance of a stroke and heart attack. If you need help quitting, talk to your doctor about stop-smoking programs and medicines. These can increase your chances of quitting for good. ? · Eat a heart-healthy diet. ? · Stay at a healthy weight. Lose weight if you need to.   ? · Limit alcohol to 2 drinks a day for men and 1 drink a day for women. Too much alcohol can cause health problems. ? · Avoid colds and flu.  Get a pneumococcal vaccine shot. If you have had one before, ask your doctor whether you need another dose. Get a flu shot every year. If you must be around people with colds or flu, wash your hands often. Activity  ? · Talk to your doctor about what type and level of exercise is safe for you. Start light exercise if your doctor says it is okay. Walking is a good choice. Try for at least 30 minutes on most days of the week. You also may want to swim, bike, or do other activities. ? · When you exercise, watch for signs that your heart is working too hard. You are pushing too hard if you can't talk while you exercise. If you become short of breath or dizzy or have chest pain, sit down and rest right away. When should you call for help? Call 911 anytime you think you may need emergency care. For example, call if:  ? · You have symptoms of a stroke. These may include:  ¨ Sudden numbness, tingling, weakness, or loss of movement in your face, arm, or leg, especially on only one side of your body. ¨ Sudden vision changes. ¨ Sudden trouble speaking. ¨ Sudden confusion or trouble understanding simple statements. ¨ Sudden problems with walking or balance. ¨ A sudden, severe headache that is different from past headaches. ? · You passed out (lost consciousness). ?Call your doctor now or seek immediate medical care if:  ? · You have new or increased shortness of breath. ? · You feel dizzy or lightheaded, or you feel like you may faint. ? · Your heart rate becomes irregular. ? · You can feel your heart flutter in your chest or skip heartbeats. Tell your doctor if these symptoms are new or worse. ? Watch closely for changes in your health, and be sure to contact your doctor if you have any problems. Where can you learn more? Go to http://mary-les.info/. Enter B443 in the search box to learn more about \"Atrial Flutter: Care Instructions. \"  Current as of: September 21, 2016  Content Version: 11.4  © 3308-7975 [employer]. Care instructions adapted under license by [employer] (which disclaims liability or warranty for this information). If you have questions about a medical condition or this instruction, always ask your healthcare professional. [address] any warranty or liability for your use of this information.

## 2018-02-23 NOTE — PROGRESS NOTES
TRANSFER - OUT REPORT:    Verbal report given to Flora Hernandez on Brandt Montana being transferred to [facility] for routine progression of care       Report consisted of patients Situation, Background, Assessment and Recommendations (SBAR). Information from the following report(s) SBAR, Kardex, ED Summary and Cardiac Rhythm NSR was reviewed with the receiving nurse. Opportunity for questions and clarification was provided. Patient transport to facility at 1230 today.

## 2018-02-23 NOTE — PROGRESS NOTES
Care Management Interventions  PCP Verified by CM: Yes  Mode of Transport at Discharge: BLS  Transition of Care Consult (CM Consult): SNF  Partner SNF: No  Reason Why Partner SNF Not Chosen: Location  Current Support Network: Lives Alone  Confirm Follow Up Transport: Family  Plan discussed with Pt/Family/Caregiver: Yes  Freedom of Choice Offered: Yes  Discharge Location  Discharge Placement: Skilled nursing facility  Patient ready for d/c and precert has been obtained from [facility] [employer] [facility]. Updated patient he was approved for rehab by insurance and patient in agreement with transfer to [facility] and rehab. Transport set up with [facility] ambulance for 1230. Notified [facility] per patient request of discharge and he voiced agreement and understanding patient to be d/c to SNF.  Patient to be d/c to [facility] and rehab room 533Q

## 2018-02-23 NOTE — PROGRESS NOTES
Patient requested to see CM about rehab. He states not sure he wants to go to rehab at [facility] and would like to go to [facility]. Explained to patient that [facility] has no beds and his insurance has approved [facility] which is the understanding is where he wanted to go. Patient stated really liked [facility] not sure wants to go to rehab. He spoke with his family and has decided he will go to [facility] and if does not like it will go home with home health from there. Encouraged patient if he has concerns at the rehab to let his nurse know or ask to see  at [employer]. He voiced understanding and states \"will see how it goes for him at [facility] as it is closer to where he lives.

## 2018-02-23 NOTE — DISCHARGE SUMMARY
[facility] Discharge Summary     Patient ID:  Mason Franco  [id number]  32 y.o.  1940    Admit date: 2/20/2018    Discharge date:  2/23/2018    Admitting Physician: Mayra Ye MD     Discharge Physician: DELMY Ramachandran/Dr. Pedrito Curran    Admission Diagnoses: afib/rvr  A-fib [facility])  Atrial fibrillation ([facility]  A-FIB    Discharge Diagnoses:    Diagnosis    Atrial fibrillation ([address]    Chronic diastolic heart failure ([address]    A-fib ([address]    Cardiomyopathy ([facility]    Abdominal aortic aneurysm without rupture (HCC)    Dyspnea    Chronic obstructive pulmonary disease (HCC)    Shortness of breath    Headache    Chest pain    Chronic renal failure    COPD (chronic obstructive pulmonary disease) (HCC)    Permanent atrial fibrillation (HCC)    TIA (transient ischemic attack)    Mitral valve regurgitation    HTN (hypertension)       Cardiology Procedures this admission:  AV node ablation   Consults: None    Hospital Course: Patient transferred from [facility] to [facility] with complaints of palpitations and shortness of breath. He had been diuresed but HR was persistently high w a fib w RVR. The patient was noted to be in atrial fibrillation with RVR on arrival. He had undergone [facility] PM w plans for AV node ablation. He had been on coumadin for prophylaxis. The patient was monitored on telemetry. On 2-21 patient was taken to the cath lab by Dr Pattricia Riedel and AV node ablation was performed. He was monitored on tele after procedure. He was seen by social work, PT/OT and placement for rehab arranged. The morning of 2/23/18, the patient was feeling much better and remained v paced. The patient was seen and examined by Dr. Pedrito Curran and was determined stable and ready for discharge home. The patient was instructed on the importance of medication compliance and outpatient follow up.   The patient will follow up with [facility] Dr. Sariah Veliz March 6 at 10:45- [facility] office. PT/INR to be monitored every 3 days at rehab and called to [facility] office. DISPOSITION: The patient is being discharged home in stable condition on a low saturated fat, low cholesterol and low salt diet. The patient is instructed to advance activities as tolerated to the limit of fatigue or shortness of breath. The patient is instructed to call the office or return to the ER for immediate evaluation for any severe shortness of breath, chest pain, prolonged palpitations, near syncope or syncope. Discharge Exam:   Visit Vitals    /77    Pulse 76    Temp 97.7 °F (36.5 °C)    Resp 18    Ht 5' 7\" (1.702 m)    Wt 67.8 kg (149 lb 6.4 oz)    SpO2 97%    BMI 23.4 kg/m2     Patient has been seen by Dr. Pete Fan: see his progress note for exam details. Recent Results (from the past 24 hour(s))   PLEASE READ & DOCUMENT PPD TEST IN 72 HRS    Collection Time: 02/22/18  5:00 PM   Result Value Ref Range    PPD Negative Negative    mm Induration 0 mm     INR 2-22-18 was 2    Patient Instructions:   Current Discharge Medication List      CONTINUE these medications which have CHANGED    Details   !! warfarin (COUMADIN) 5 mg tablet Take 1 Tab by mouth four (4) days a week. 5 mg tues, Thursday, sat and Sunday  Qty: 60 Tab, Refills: 1       !! - Potential duplicate medications found. Please discuss with provider. CONTINUE these medications which have NOT CHANGED    Details   !! warfarin (COUMADIN) 2.5 mg tablet Take 2.5 mg by mouth every Monday, Wednesday, Friday. omeprazole (PRILOSEC) 20 mg capsule Take 20 mg by mouth daily. senna-docusate (SENNA PLUS) 8.6-50 mg per tablet Take 1 Tab by mouth daily. montelukast (SINGULAIR) 10 mg tablet Take 10 mg by mouth daily. nitroglycerin (NITROSTAT) 0.4 mg SL tablet 1 Tab by SubLINGual route every five (5) minutes as needed for Chest Pain.   Qty: 1 Bottle, Refills: 2      albuterol (PROAIR HFA) 90 mcg/actuation inhaler Take  by inhalation. furosemide (LASIX) 40 mg tablet Take 1 tablet daily or as directed  Qty: 135 Tab, Refills: 3    Associated Diagnoses: Edema, unspecified type      guaiFENesin SR (MUCINEX) 600 mg SR tablet Take 400 mg by mouth as needed. !! fluticasone-vilanterol (BREO ELLIPTA) 100-25 mcg/dose inhaler Take 1 Puff by inhalation daily. albuterol (ACCUNEB) 1.25 mg/3 mL nebu 1.25 mg by Nebulization route every six (6) hours as needed. acetaminophen (TYLENOL) 500 mg tablet Take  by mouth every six (6) hours as needed for Pain. !! fluticasone-vilanterol (BREO ELLIPTA) 100-25 mcg/dose inhaler Take 1 Puff by inhalation daily. !! - Potential duplicate medications found. Please discuss with provider.       STOP taking these medications       dilTIAZem (CARDIZEM CD) 360 mg ER capsule Comments:   Reason for Stopping:                 Signed:  Gerry Allan PA-C  2/23/2018  10:19 AM

## 2018-02-23 NOTE — PROGRESS NOTES
Bedside and Verbal shift change report given to [facility], [facility], RN (oncoming nurse) by self (offgoing nurse). Report included the following information SBAR, Kardex, MAR and Recent Results. S/p right groin ablation site - dressing dry and intact, old ooze noted on right corner, no bleeding or hematoma noted.

## 2018-02-23 NOTE — PROGRESS NOTES
Bedside and Verbal shift change report given to self (oncoming nurse) by [facility], [facility], RN (offgoing nurse). Report included the following information SBAR, Kardex, MAR and Recent Results. S/p right groin ablation site visualized - dressing dry and intact. Noted old ooze on left corner of gauze. No bleeding or hematoma noted.

## 2018-02-26 ENCOUNTER — HOSPITAL ENCOUNTER (INPATIENT)
Age: 78
LOS: 4 days | Discharge: NURSING, REHAB OR LONG-TERM CARE | DRG: 287 | End: 2018-03-02
Attending: INTERNAL MEDICINE | Admitting: INTERNAL MEDICINE
Payer: MEDICARE

## 2018-02-26 PROBLEM — I21.4 NSTEMI (NON-ST ELEVATED MYOCARDIAL INFARCTION) (HCC): Status: ACTIVE | Noted: 2018-02-26

## 2018-02-26 LAB
ANION GAP SERPL CALC-SCNC: 11 MMOL/L (ref 7–16)
BUN SERPL-MCNC: 26 MG/DL (ref 8–23)
CALCIUM SERPL-MCNC: 8.8 MG/DL (ref 8.3–10.4)
CHLORIDE SERPL-SCNC: 106 MMOL/L (ref 98–107)
CO2 SERPL-SCNC: 23 MMOL/L (ref 21–32)
CREAT SERPL-MCNC: 1.09 MG/DL (ref 0.8–1.5)
ERYTHROCYTE [DISTWIDTH] IN BLOOD BY AUTOMATED COUNT: 13.3 % (ref 11.9–14.6)
GLUCOSE SERPL-MCNC: 103 MG/DL (ref 65–100)
HCT VFR BLD AUTO: 37.1 % (ref 41.1–50.3)
HGB BLD-MCNC: 12.4 G/DL (ref 13.6–17.2)
INR PPP: 1.6
MCH RBC QN AUTO: 29.1 PG (ref 26.1–32.9)
MCHC RBC AUTO-ENTMCNC: 33.4 G/DL (ref 31.4–35)
MCV RBC AUTO: 87.1 FL (ref 79.6–97.8)
PLATELET # BLD AUTO: 225 K/UL (ref 150–450)
PMV BLD AUTO: 9.1 FL (ref 10.8–14.1)
POTASSIUM SERPL-SCNC: 4.3 MMOL/L (ref 3.5–5.1)
PROTHROMBIN TIME: 18.7 SEC (ref 11.5–14.5)
RBC # BLD AUTO: 4.26 M/UL (ref 4.23–5.67)
SODIUM SERPL-SCNC: 140 MMOL/L (ref 136–145)
TROPONIN I SERPL-MCNC: 1.83 NG/ML (ref 0.02–0.05)
WBC # BLD AUTO: 6 K/UL (ref 4.3–11.1)

## 2018-02-26 PROCEDURE — 74011000250 HC RX REV CODE- 250: Performed by: INTERNAL MEDICINE

## 2018-02-26 PROCEDURE — 80048 BASIC METABOLIC PNL TOTAL CA: CPT | Performed by: PHYSICIAN ASSISTANT

## 2018-02-26 PROCEDURE — 74011250637 HC RX REV CODE- 250/637: Performed by: INTERNAL MEDICINE

## 2018-02-26 PROCEDURE — 85027 COMPLETE CBC AUTOMATED: CPT | Performed by: PHYSICIAN ASSISTANT

## 2018-02-26 PROCEDURE — 94760 N-INVAS EAR/PLS OXIMETRY 1: CPT

## 2018-02-26 PROCEDURE — 74011250636 HC RX REV CODE- 250/636: Performed by: INTERNAL MEDICINE

## 2018-02-26 PROCEDURE — 84484 ASSAY OF TROPONIN QUANT: CPT | Performed by: PHYSICIAN ASSISTANT

## 2018-02-26 PROCEDURE — 94640 AIRWAY INHALATION TREATMENT: CPT

## 2018-02-26 PROCEDURE — 85610 PROTHROMBIN TIME: CPT | Performed by: PHYSICIAN ASSISTANT

## 2018-02-26 PROCEDURE — 74011250636 HC RX REV CODE- 250/636: Performed by: NURSE PRACTITIONER

## 2018-02-26 PROCEDURE — 65660000000 HC RM CCU STEPDOWN

## 2018-02-26 PROCEDURE — 36415 COLL VENOUS BLD VENIPUNCTURE: CPT | Performed by: PHYSICIAN ASSISTANT

## 2018-02-26 RX ORDER — METOPROLOL TARTRATE 25 MG/1
25 TABLET, FILM COATED ORAL 2 TIMES DAILY
Status: DISCONTINUED | OUTPATIENT
Start: 2018-02-26 | End: 2018-03-01

## 2018-02-26 RX ORDER — SODIUM CHLORIDE 0.9 % (FLUSH) 0.9 %
5-10 SYRINGE (ML) INJECTION EVERY 8 HOURS
Status: DISCONTINUED | OUTPATIENT
Start: 2018-02-26 | End: 2018-02-27 | Stop reason: SDUPTHER

## 2018-02-26 RX ORDER — SODIUM CHLORIDE 0.9 % (FLUSH) 0.9 %
5-10 SYRINGE (ML) INJECTION EVERY 8 HOURS
Status: DISCONTINUED | OUTPATIENT
Start: 2018-02-26 | End: 2018-03-02 | Stop reason: HOSPADM

## 2018-02-26 RX ORDER — ALBUTEROL SULFATE 90 UG/1
2 AEROSOL, METERED RESPIRATORY (INHALATION)
Status: DISCONTINUED | OUTPATIENT
Start: 2018-02-26 | End: 2018-03-02 | Stop reason: HOSPADM

## 2018-02-26 RX ORDER — ACETAMINOPHEN 325 MG/1
650 TABLET ORAL
Status: DISCONTINUED | OUTPATIENT
Start: 2018-02-26 | End: 2018-03-02 | Stop reason: HOSPADM

## 2018-02-26 RX ORDER — SODIUM CHLORIDE 9 MG/ML
75 INJECTION, SOLUTION INTRAVENOUS CONTINUOUS
Status: DISCONTINUED | OUTPATIENT
Start: 2018-02-26 | End: 2018-02-28

## 2018-02-26 RX ORDER — MONTELUKAST SODIUM 10 MG/1
10 TABLET ORAL DAILY
Status: DISCONTINUED | OUTPATIENT
Start: 2018-02-27 | End: 2018-03-02 | Stop reason: HOSPADM

## 2018-02-26 RX ORDER — ASPIRIN 81 MG/1
81 TABLET ORAL DAILY
Status: DISCONTINUED | OUTPATIENT
Start: 2018-02-27 | End: 2018-03-01

## 2018-02-26 RX ORDER — SODIUM CHLORIDE 0.9 % (FLUSH) 0.9 %
5-10 SYRINGE (ML) INJECTION AS NEEDED
Status: DISCONTINUED | OUTPATIENT
Start: 2018-02-26 | End: 2018-02-27 | Stop reason: SDUPTHER

## 2018-02-26 RX ORDER — HEPARIN SODIUM 5000 [USP'U]/ML
4000 INJECTION, SOLUTION INTRAVENOUS; SUBCUTANEOUS ONCE
Status: COMPLETED | OUTPATIENT
Start: 2018-02-26 | End: 2018-02-26

## 2018-02-26 RX ORDER — HEPARIN SODIUM 5000 [USP'U]/100ML
12-25 INJECTION, SOLUTION INTRAVENOUS
Status: DISCONTINUED | OUTPATIENT
Start: 2018-02-26 | End: 2018-02-27

## 2018-02-26 RX ORDER — SODIUM CHLORIDE 0.9 % (FLUSH) 0.9 %
5-10 SYRINGE (ML) INJECTION AS NEEDED
Status: DISCONTINUED | OUTPATIENT
Start: 2018-02-26 | End: 2018-03-02 | Stop reason: HOSPADM

## 2018-02-26 RX ORDER — ONDANSETRON 2 MG/ML
4 INJECTION INTRAMUSCULAR; INTRAVENOUS
Status: DISCONTINUED | OUTPATIENT
Start: 2018-02-26 | End: 2018-03-02 | Stop reason: HOSPADM

## 2018-02-26 RX ORDER — NITROGLYCERIN 0.4 MG/1
0.4 TABLET SUBLINGUAL
Status: DISCONTINUED | OUTPATIENT
Start: 2018-02-26 | End: 2018-03-02 | Stop reason: HOSPADM

## 2018-02-26 RX ORDER — BUDESONIDE 0.5 MG/2ML
500 INHALANT ORAL
Status: DISCONTINUED | OUTPATIENT
Start: 2018-02-26 | End: 2018-03-02 | Stop reason: HOSPADM

## 2018-02-26 RX ORDER — MORPHINE SULFATE 4 MG/ML
2 INJECTION, SOLUTION INTRAMUSCULAR; INTRAVENOUS
Status: DISCONTINUED | OUTPATIENT
Start: 2018-02-26 | End: 2018-03-02 | Stop reason: HOSPADM

## 2018-02-26 RX ORDER — PANTOPRAZOLE SODIUM 40 MG/1
40 TABLET, DELAYED RELEASE ORAL
Status: DISCONTINUED | OUTPATIENT
Start: 2018-02-27 | End: 2018-03-02 | Stop reason: HOSPADM

## 2018-02-26 RX ORDER — ALBUTEROL SULFATE 2.5 MG/.5ML
2.5 SOLUTION RESPIRATORY (INHALATION)
Status: DISCONTINUED | OUTPATIENT
Start: 2018-02-26 | End: 2018-03-02 | Stop reason: HOSPADM

## 2018-02-26 RX ADMIN — BUDESONIDE 500 MCG: 0.5 INHALANT RESPIRATORY (INHALATION) at 20:29

## 2018-02-26 RX ADMIN — NITROGLYCERIN 1 INCH: 20 OINTMENT TOPICAL at 19:55

## 2018-02-26 RX ADMIN — Medication 10 ML: at 21:06

## 2018-02-26 RX ADMIN — METOPROLOL TARTRATE 25 MG: 25 TABLET ORAL at 21:05

## 2018-02-26 RX ADMIN — SODIUM CHLORIDE 75 ML/HR: 900 INJECTION, SOLUTION INTRAVENOUS at 18:44

## 2018-02-26 RX ADMIN — Medication 10 ML: at 21:07

## 2018-02-26 RX ADMIN — HEPARIN SODIUM 4000 UNITS: 5000 INJECTION, SOLUTION INTRAVENOUS; SUBCUTANEOUS at 22:29

## 2018-02-26 RX ADMIN — ALBUTEROL SULFATE 2.5 MG: 2.5 SOLUTION RESPIRATORY (INHALATION) at 20:29

## 2018-02-26 RX ADMIN — Medication 10 ML: at 18:50

## 2018-02-26 RX ADMIN — HEPARIN SODIUM AND DEXTROSE 12 UNITS/KG/HR: 5000; 5 INJECTION INTRAVENOUS at 22:29

## 2018-02-26 NOTE — PROGRESS NOTES
Assessment completed upon patients arrival to unit and care assumed. Patient received to room 312. Patient connected to monitor and assessment completed. Plan of care reviewed. Patient oriented to room and call light. Patient educated on falls risk, gripper socks applied, and instructed to call for assistance before getting out of bed. Patient aware to use call light to communicate any chest pain or needs. Admission skin assessment completed with second RN and reveals the following: Small abrasion (shiny patch of skin) on left upper back. BLE's are flaky with hardened skin on lower extremities and heels. Toenails with thick, chipped, yellow nails. Sacrum is intact with no breakdown. Right groin closed puncture site from ablation done last admission.

## 2018-02-26 NOTE — IP AVS SNAPSHOT
[address] [facility] 
 
 
 [address] 
[phone] Patient: Fercho Felix MRN: [id number] GJK:[birth year] About your hospitalization You were admitted on:  February 26, 2018 You last received care in the:  [facility] 3 TELEMETRY You were discharged on:  March 2, 2018 Why you were hospitalized Your primary diagnosis was:  Elevated Troponin Level Your diagnoses also included:  Chest Pain, Permanent Atrial Fibrillation (Hcc), Htn (Hypertension) Follow-up Information Follow up With Details Comments Contact Info Natacha Aggarwal MD  3-6-18 at [address] office [address]  [address] [facility] [address] 
[phone] Leticia Mccain MD  As needed [address] [facility] [phone] [id number] [phone] Your Scheduled Appointments Tuesday March 06, 2018 10:45 AM [facility] HOSPITAL FOLLOW-UP with Natacha Aggarwal MD  
1 [facility] ([address] [place] [address]) [address] [facility] [id number] [phone] Tuesday April 10, 2018  2:00 PM EDT RESEARCH with 8929 [facility] OFFICE ([address] [place] [address]) [address]  
[phone] Tuesday April 10, 2018  2:00 PM EDT  
OFFICE DEVICE CHECKS with GVLLE DEVICE 39 [facility] CARDIOLOGY [place] OFFICE ([address] [place] [address]) [address]  
[phone] This upcoming device check will take place IN OUR OFFICE. Please arrive 15 minutes early to review any necessary paperwork requirements. If you have any further questions or need to change this appointment, we are happy to help and can be reached at [phone]. Tuesday April 10, 2018  2:45 PM EDT Office Visit with Jazmyn Silva MD  
[facility] CARDIOLOGY 8001 [facility] OFFICE ([address] [place] [address]) [address]  
[phone] Wednesday April 18, 2018 10:30 AM EDT Office Visit with Gibran Cisse MD  
1 [facility] ([address] [place] [address]) [address] [place] [id number] [phone] Discharge Orders Procedure Order Date Status Priority Quantity Spec Type Associated Dx CBC W/O DIFF 03/02/18 0738 Normal Routine 1 Whole Blood Comments:  Dx: Normochromic anemia A check martita indicates which time of day the medication should be taken. My Medications START taking these medications Instructions Each Dose to Equal  
 Morning Noon Evening Bedtime  
 magnesium oxide 400 mg tablet Commonly known as:  MAG-OX Take 1 Tab by mouth two (2) times a day. 400 mg CHANGE how you take these medications Instructions Each Dose to Equal  
 Morning Noon Evening Bedtime  
 furosemide 40 mg tablet Commonly known as:  LASIX What changed:   
- how much to take - when to take this 
- reasons to take this 
- additional instructions Take 1 tablet daily or as directed  
     
   
   
   
  
 warfarin 5 mg tablet Commonly known as:  COUMADIN What changed:   
- when to take this 
- additional instructions - Another medication with the same name was removed. Continue taking this medication, and follow the directions you see here. Take 1 Tab by mouth nightly. 5 mg CONTINUE taking these medications Instructions Each Dose to Equal  
 Morning Noon Evening Bedtime  
 acetaminophen 500 mg tablet Commonly known as:  TYLENOL Take  by mouth every six (6) hours as needed for Pain. * albuterol 1.25 mg/3 mL Nebu Commonly known as:  ACCUNEB  
   
 1.25 mg by Nebulization route every six (6) hours as needed. 1.25 mg  
    
   
   
   
  
 * PROAIR HFA 90 mcg/actuation inhaler Generic drug:  albuterol Take  by inhalation. BREO ELLIPTA 100-25 mcg/dose inhaler Generic drug:  fluticasone-vilanterol Take 1 Puff by inhalation daily. 1 Puff  
    
  
   
   
   
  
 guaiFENesin  mg SR tablet Commonly known as:  Johnathon & Johnathon Take 400 mg by mouth as needed. 400 mg  
    
   
   
   
  
 nitroglycerin 0.4 mg SL tablet Commonly known as:  NITROSTAT  
   
 1 Tab by SubLINGual route every five (5) minutes as needed for Chest Pain. 0.4 mg  
    
   
   
   
  
 omeprazole 20 mg capsule Commonly known as:  PRILOSEC Take 20 mg by mouth daily. 20 mg SENNA PLUS 8.6-50 mg per tablet Generic drug:  senna-docusate Take 1 Tab by mouth as needed. 1 Tab SINGULAIR 10 mg tablet Generic drug:  montelukast  
   
 Take 10 mg by mouth daily. 10 mg  
    
  
   
   
   
  
 * Notice: This list has 2 medication(s) that are the same as other medications prescribed for you. Read the directions carefully, and ask your doctor or other care provider to review them with you. STOP taking these medications   
 metoprolol tartrate 25 mg tablet Commonly known as:  LOPRESSOR Where to Get Your Medications Information on where to get these meds will be given to you by the nurse or doctor. ! Ask your nurse or doctor about these medications  
  magnesium oxide 400 mg tablet  
 warfarin 5 mg tablet Discharge Instructions Cardiac Catheterization/Angiography Discharge Instructions *Check the puncture site frequently for swelling or bleeding. If you see any bleeding, lie down and apply pressure over the area with a clean town or washcloth. Notify your doctor for any redness, swelling, drainage or oozing from the puncture site. Notify your doctor for any fever or chills.  
 
*If the leg or arm with the puncture becomes cold, numb or painful, call [facility] Cardiology at [phone]. *Activity should be limited for the next 48 hours. Climb stairs as little as possible and avoid any stooping, bending or strenuous activity for 48 hours. No heavy lifting (anything over 10 pounds) for three days. *Do not drive for 48 hours. *You may resume your usual diet. Drink more fluids than usual. 
 
*Have a responsible person drive you home and stay with you for at least 24 hours after your heart catheterization/angiography. *You may remove the bandage from your Right, Groin and Arm in 24 hours. You may shower in 24 hours. No tub baths, hot tubs or swimming for one week. Do not place any lotions, creams, powders, ointments over the puncture site for one week. You may place a clean band-aid over the puncture site each day for 5 days. Change this daily. Angina: Care Instructions Your Care Instructions You have a problem called angina. Angina happens when there is not enough blood flow to your heart muscle. Angina is a sign of coronary artery disease (CAD). CAD occurs when blood vessels that supply the heart become narrowed. Having CAD increases your risk of a heart attack. Chest pain or pressure is the most common symptom of angina. But some people have other symptoms, like: 
· Pain, pressure, or a strange feeling in the back, neck, jaw, or upper belly, or in one or both shoulders or arms. · Shortness of breath. · Nausea or vomiting. · Lightheadedness or sudden weakness. · Fast or irregular heartbeat. Women are somewhat more likely than men to have angina symptoms like shortness of breath, nausea, and back or jaw pain. Angina can be dangerous. That's why it is important to pay attention to your symptoms. Know what is typical for you, learn how to control your symptoms, and understand when you need to get treatment. A change in your usual pattern of symptoms is an emergency. It may mean that you are having a heart attack. The doctor has checked you carefully, but problems can develop later. If you notice any problems or new symptoms, get medical treatment right away. Follow-up care is a key part of your treatment and safety. Be sure to make and go to all appointments, and call your doctor if you are having problems. It's also a good idea to know your test results and keep a list of the medicines you take. How can you care for yourself at home? Medicines ? · If your doctor has given you nitroglycerin for angina symptoms, keep it with you at all times. If you have symptoms, sit down and rest, and take the first dose of nitroglycerin as directed. If your symptoms get worse or are not getting better within 5 minutes, call 911 right away. Stay on the phone. The emergency  will give you further instructions. ? · If your doctor advises it, take 1 low-dose aspirin a day to prevent heart attack. ? · Be safe with medicines. Take your medicines exactly as prescribed. Call your doctor if you think you are having a problem with your medicine. You will get more details on the specific medicines your doctor prescribes. ? Lifestyle changes ? · Do not smoke. If you need help quitting, talk to your doctor about stop-smoking programs and medicines. These can increase your chances of quitting for good. ? · Eat a heart-healthy diet that is low in saturated fat and salt, and is high in fiber. Talk to your doctor or a dietitian about healthy eating. ? · Stay at a healthy weight. Or lose weight if you need to. Activity ? · Talk to your doctor about a level of activity that is safe for you. ? · If an activity causes angina symptoms, stop and rest.  
When should you call for help? Call 911 anytime you think you may need emergency care. For example, call if: 
? · You passed out (lost consciousness). ? · You have symptoms of a heart attack. These may include: ¨ Chest pain or pressure, or a strange feeling in the chest. 
 ¨ Sweating. ¨ Shortness of breath. ¨ Nausea or vomiting. ¨ Pain, pressure, or a strange feeling in the back, neck, jaw, or upper belly or in one or both shoulders or arms. ¨ Lightheadedness or sudden weakness. ¨ A fast or irregular heartbeat. After you call 911, the  may tell you to chew 1 adult-strength or 2 to 4 low-dose aspirin. Wait for an ambulance. Do not try to drive yourself. ? · You have angina symptoms that do not go away with rest or are not getting better within 5 minutes after you take a dose of nitroglycerin. ?Call your doctor now or seek immediate medical care if: 
? · You are having angina symptoms more often than usual, or they are different or worse than usual.  
? · You feel dizzy or lightheaded, or you feel like you may faint. ? Watch closely for changes in your health, and be sure to contact your doctor if you have any problems. Where can you learn more? Go to http://mary-les.info/. Enter H129 in the search box to learn more about \"Angina: Care Instructions. \" Current as of: September 21, 2016 Content Version: 11.4 © 2048-1299 [employer]. Care instructions adapted under license by [employer] (which disclaims liability or warranty for this information). If you have questions about a medical condition or this instruction, always ask your healthcare professional. [place] [address] any warranty or liability for your use of this information. DASH Diet: Care Instructions Your Care Instructions The DASH diet is an eating plan that can help lower your blood pressure. DASH stands for Dietary Approaches to Stop Hypertension. Hypertension is high blood pressure. The DASH diet focuses on eating foods that are high in calcium, potassium, and magnesium. These nutrients can lower blood pressure.  The foods that are highest in these nutrients are fruits, vegetables, low-fat dairy products, nuts, seeds, and legumes. But taking calcium, potassium, and magnesium supplements instead of eating foods that are high in those nutrients does not have the same effect. The DASH diet also includes whole grains, fish, and poultry. The DASH diet is one of several lifestyle changes your doctor may recommend to lower your high blood pressure. Your doctor may also want you to decrease the amount of sodium in your diet. Lowering sodium while following the DASH diet can lower blood pressure even further than just the DASH diet alone. Follow-up care is a key part of your treatment and safety. Be sure to make and go to all appointments, and call your doctor if you are having problems. It's also a good idea to know your test results and keep a list of the medicines you take. How can you care for yourself at home? Following the DASH diet · Eat 4 to 5 servings of fruit each day. A serving is 1 medium-sized piece of fruit, ½ cup chopped or canned fruit, 1/4 cup dried fruit, or 4 ounces (½ cup) of fruit juice. Choose fruit more often than fruit juice. · Eat 4 to 5 servings of vegetables each day. A serving is 1 cup of lettuce or raw leafy vegetables, ½ cup of chopped or cooked vegetables, or 4 ounces (½ cup) of vegetable juice. Choose vegetables more often than vegetable juice. · Get 2 to 3 servings of low-fat and fat-free dairy each day. A serving is 8 ounces of milk, 1 cup of yogurt, or 1 ½ ounces of cheese. · Eat 6 to 8 servings of grains each day. A serving is 1 slice of bread, 1 ounce of dry cereal, or ½ cup of cooked rice, pasta, or cooked cereal. Try to choose whole-grain products as much as possible. · Limit lean meat, poultry, and fish to 2 servings each day. A serving is 3 ounces, about the size of a deck of cards. · Eat 4 to 5 servings of nuts, seeds, and legumes (cooked dried beans, lentils, and split peas) each week.  A serving is 1/3 cup of nuts, 2 tablespoons of seeds, or ½ cup of cooked beans or peas. · Limit fats and oils to 2 to 3 servings each day. A serving is 1 teaspoon of vegetable oil or 2 tablespoons of salad dressing. · Limit sweets and added sugars to 5 servings or less a week. A serving is 1 tablespoon jelly or jam, ½ cup sorbet, or 1 cup of lemonade. · Eat less than 2,300 milligrams (mg) of sodium a day. If you limit your sodium to 1,500 mg a day, you can lower your blood pressure even more. Tips for success · Start small. Do not try to make dramatic changes to your diet all at once. You might feel that you are missing out on your favorite foods and then be more likely to not follow the plan. Make small changes, and stick with them. Once those changes become habit, add a few more changes. · Try some of the following: ¨ Make it a goal to eat a fruit or vegetable at every meal and at snacks. This will make it easy to get the recommended amount of fruits and vegetables each day. ¨ Try yogurt topped with fruit and nuts for a snack or healthy dessert. ¨ Add lettuce, tomato, cucumber, and onion to sandwiches. ¨ Combine a ready-made pizza crust with low-fat mozzarella cheese and lots of vegetable toppings. Try using tomatoes, squash, spinach, broccoli, carrots, cauliflower, and onions. ¨ Have a variety of cut-up vegetables with a low-fat dip as an appetizer instead of chips and dip. ¨ Sprinkle sunflower seeds or chopped almonds over salads. Or try adding chopped walnuts or almonds to cooked vegetables. ¨ Try some vegetarian meals using beans and peas. Add garbanzo or kidney beans to salads. Make burritos and tacos with mashed vuong beans or black beans. Where can you learn more? Go to http://mary-les.info/. Enter N295 in the search box to learn more about \"DASH Diet: Care Instructions. \" Current as of: September 21, 2016 Content Version: 11.4 © 0242-7200 Healthwise[employer] Incorporated.  Care instructions adapted under license by Aurelio Baumann (which disclaims liability or warranty for this information). If you have questions about a medical condition or this instruction, always ask your healthcare professional. [address] any warranty or liability for your use of this information. Parkmobile Announcement We are excited to announce that we are making your provider's discharge notes available to you in Parkmobile. You will see these notes when they are completed and signed by the physician that discharged you from your recent hospital stay. If you have any questions or concerns about any information you see in Parkmobile, please call the Health Information Department where you were seen or reach out to your Primary Care Provider for more information about your plan of care. Introducing [facility]! [facility] introduces Parkmobile patient portal. Now you can access parts of your medical record, email your doctor's office, and request medication refills online. 1. In your internet browser, go to [email or web address] 2. Click on the First Time User? Click Here link in the Sign In box. You will see the New Member Sign Up page. 3. Enter your Parkmobile Access Code exactly as it appears below. You will not need to use this code after youve completed the sign-up process. If you do not sign up before the expiration date, you must request a new code. · Parkmobile Access Code: [email or web address] Expires: 4/9/2018  9:32 AM 
 
4. Enter the last four digits of your Social Security Number (xxxx) and Date of Birth (mm/dd/yyyy) as indicated and click Submit. You will be taken to the next sign-up page. 5. Create a Parkmobile ID. This will be your Parkmobile login ID and cannot be changed, so think of one that is secure and easy to remember. 6. Create a Parkmobile password. You can change your password at any time. 7. Enter your Password Reset Question and Answer. This can be used at a later time if you forget your password. 8. Enter your e-mail address. You will receive e-mail notification when new information is available in [address] 9. Click Sign Up. You can now view and download portions of your medical record. 10. Click the Download Summary menu link to download a portable copy of your medical information. If you have questions, please visit the Frequently Asked Questions section of the [facility] website. Remember, [facility] is NOT to be used for urgent needs. For medical emergencies, dial 911. Now available from your iPhone and Android! Providers Seen During Your Hospitalization Provider Specialty Primary office phone Edwardo Frazier MD Cardiology [phone] Your Primary Care Physician (PCP) Primary Care Physician Office Phone Office Fax Marzena Larry [phone] You are allergic to the following Allergen Reactions Levaquin (Levofloxacin) Other (comments) Prednisone Palpitations Recent Documentation Weight BMI Smoking Status 66.6 kg 23.01 kg/m2 Never Smoker Emergency Contacts Name Discharge Info Relation Home Work Mobile Connor Fletcher DISCHARGE CAREGIVER [3] Other Relative [6] [phone] Maximiliano Irwin DISCHARGE CAREGIVER [3] Other Relative [6] [phone] Lynn Irwin DISCHARGE CAREGIVER [3] Other Relative [6] [phone] Patient Belongings The following personal items are in your possession at time of discharge: 
  Dental Appliances: Lowers, Uppers  Visual Aid: Glasses, At bedside      Home Medications: Kept at bedside   Jewelry: None  Clothing: Pants, Shirt, Socks    Other Valuables: Cell Phone Please provide this summary of care documentation to your next provider.  
  
  
 
  
Signatures-by signing, you are acknowledging that this After Visit Summary has been reviewed with you and you have received a copy. Patient Signature:  ____________________________________________________________ Date:  ____________________________________________________________  
  
Monica Saver Provider Signature:  ____________________________________________________________ Date:  ____________________________________________________________

## 2018-02-26 NOTE — PROGRESS NOTES
TRANSFER - IN REPORT:    Verbal report received from Basilia Ponce RN  on Jovita Alarcon being received from [facility] for routine progression of care. Report consisted of patients Situation, Background, Assessment and Recommendations(SBAR). Information from the following report(s) SBAR, Kardex, [facility] and Recent Results was reviewed. Opportunity for questions and clarification was provided.

## 2018-02-26 NOTE — PROGRESS NOTES
Note dictated- NSTEMI-  worse. Left heart catheterization with possible angioplasty and alternative therapies discussed. Risks and benefits of the procedure including bleeding, arterial injury, infection, MI, stoke, death, emergent cabg, acute renal failure, contrast allergic reaction were discussed and questions answered.

## 2018-02-26 NOTE — IP AVS SNAPSHOT
Kenrick Maciel 
 
 
 [address] 
[phone] Patient: Chetan Mcbride MRN: [id number] [facility]:[birth year]7002 A check martita indicates which time of day the medication should be taken. My Medications START taking these medications Instructions Each Dose to Equal  
 Morning Noon Evening Bedtime  
 magnesium oxide 400 mg tablet Commonly known as:  MAG-OX Take 1 Tab by mouth two (2) times a day. 400 mg CHANGE how you take these medications Instructions Each Dose to Equal  
 Morning Noon Evening Bedtime  
 furosemide 40 mg tablet Commonly known as:  LASIX What changed:   
- how much to take - when to take this 
- reasons to take this 
- additional instructions Take 1 tablet daily or as directed  
     
   
   
   
  
 warfarin 5 mg tablet Commonly known as:  COUMADIN What changed:   
- when to take this 
- additional instructions - Another medication with the same name was removed. Continue taking this medication, and follow the directions you see here. Take 1 Tab by mouth nightly. 5 mg CONTINUE taking these medications Instructions Each Dose to Equal  
 Morning Noon Evening Bedtime  
 acetaminophen 500 mg tablet Commonly known as:  TYLENOL Take  by mouth every six (6) hours as needed for Pain. * albuterol 1.25 mg/3 mL Nebu Commonly known as:  ACCUNEB  
   
 1.25 mg by Nebulization route every six (6) hours as needed. 1.25 mg  
    
   
   
   
  
 * PROAIR HFA 90 mcg/actuation inhaler Generic drug:  albuterol Take  by inhalation. BREO ELLIPTA 100-25 mcg/dose inhaler Generic drug:  fluticasone-vilanterol Take 1 Puff by inhalation daily. 1 Puff  
    
  
   
   
   
  
 guaiFENesin  mg SR tablet Commonly known as:  Johnathon [employer] Take 400 mg by mouth as needed. 400 mg  
    
   
   
   
  
 nitroglycerin 0.4 mg SL tablet Commonly known as:  NITROSTAT  
   
 1 Tab by SubLINGual route every five (5) minutes as needed for Chest Pain. 0.4 mg  
    
   
   
   
  
 omeprazole 20 mg capsule Commonly known as:  PRILOSEC Take 20 mg by mouth daily. 20 mg SENNA PLUS 8.6-50 mg per tablet Generic drug:  senna-docusate Take 1 Tab by mouth as needed. 1 Tab SINGULAIR 10 mg tablet Generic drug:  montelukast  
   
 Take 10 mg by mouth daily. 10 mg  
    
  
   
   
   
  
 * Notice: This list has 2 medication(s) that are the same as other medications prescribed for you. Read the directions carefully, and ask your doctor or other care provider to review them with you. STOP taking these medications   
 metoprolol tartrate 25 mg tablet Commonly known as:  LOPRESSOR Where to Get Your Medications Information on where to get these meds will be given to you by the nurse or doctor. ! Ask your nurse or doctor about these medications  
  magnesium oxide 400 mg tablet  
 warfarin 5 mg tablet

## 2018-02-27 PROBLEM — R77.8 ELEVATED TROPONIN LEVEL: Status: ACTIVE | Noted: 2018-02-26

## 2018-02-27 LAB
ACT BLD: 197 SECS (ref 70–128)
ANION GAP SERPL CALC-SCNC: 10 MMOL/L (ref 7–16)
APTT PPP: 91.2 SEC (ref 23.2–35.3)
BUN SERPL-MCNC: 27 MG/DL (ref 8–23)
CALCIUM SERPL-MCNC: 8.5 MG/DL (ref 8.3–10.4)
CHLORIDE SERPL-SCNC: 107 MMOL/L (ref 98–107)
CHOLEST SERPL-MCNC: 151 MG/DL
CO2 SERPL-SCNC: 23 MMOL/L (ref 21–32)
CREAT SERPL-MCNC: 0.9 MG/DL (ref 0.8–1.5)
ERYTHROCYTE [DISTWIDTH] IN BLOOD BY AUTOMATED COUNT: 13.3 % (ref 11.9–14.6)
GLUCOSE SERPL-MCNC: 88 MG/DL (ref 65–100)
HCT VFR BLD AUTO: 32.2 % (ref 41.1–50.3)
HDLC SERPL-MCNC: 76 MG/DL (ref 40–60)
HDLC SERPL: 2 {RATIO}
HGB BLD-MCNC: 10.7 G/DL (ref 13.6–17.2)
INR PPP: 1.8
LDLC SERPL CALC-MCNC: 65.8 MG/DL
LIPID PROFILE,FLP: ABNORMAL
MCH RBC QN AUTO: 28.9 PG (ref 26.1–32.9)
MCHC RBC AUTO-ENTMCNC: 33.2 G/DL (ref 31.4–35)
MCV RBC AUTO: 87 FL (ref 79.6–97.8)
PLATELET # BLD AUTO: 218 K/UL (ref 150–450)
PMV BLD AUTO: 9.3 FL (ref 10.8–14.1)
POTASSIUM SERPL-SCNC: 3.9 MMOL/L (ref 3.5–5.1)
PROTHROMBIN TIME: 20.1 SEC (ref 11.5–14.5)
RBC # BLD AUTO: 3.7 M/UL (ref 4.23–5.67)
SODIUM SERPL-SCNC: 140 MMOL/L (ref 136–145)
TRIGL SERPL-MCNC: 46 MG/DL (ref 35–150)
TROPONIN I SERPL-MCNC: 1.44 NG/ML (ref 0.02–0.05)
TROPONIN I SERPL-MCNC: 1.67 NG/ML (ref 0.02–0.05)
VLDLC SERPL CALC-MCNC: 9.2 MG/DL (ref 6–23)
WBC # BLD AUTO: 5.3 K/UL (ref 4.3–11.1)

## 2018-02-27 PROCEDURE — 74011636320 HC RX REV CODE- 636/320: Performed by: INTERNAL MEDICINE

## 2018-02-27 PROCEDURE — B2151ZZ FLUOROSCOPY OF LEFT HEART USING LOW OSMOLAR CONTRAST: ICD-10-PCS | Performed by: INTERNAL MEDICINE

## 2018-02-27 PROCEDURE — 74011250636 HC RX REV CODE- 250/636

## 2018-02-27 PROCEDURE — 74011250636 HC RX REV CODE- 250/636: Performed by: INTERNAL MEDICINE

## 2018-02-27 PROCEDURE — 93458 L HRT ARTERY/VENTRICLE ANGIO: CPT

## 2018-02-27 PROCEDURE — 94640 AIRWAY INHALATION TREATMENT: CPT

## 2018-02-27 PROCEDURE — 84484 ASSAY OF TROPONIN QUANT: CPT | Performed by: PHYSICIAN ASSISTANT

## 2018-02-27 PROCEDURE — 74011000250 HC RX REV CODE- 250: Performed by: INTERNAL MEDICINE

## 2018-02-27 PROCEDURE — 80061 LIPID PANEL: CPT | Performed by: PHYSICIAN ASSISTANT

## 2018-02-27 PROCEDURE — B2111ZZ FLUOROSCOPY OF MULTIPLE CORONARY ARTERIES USING LOW OSMOLAR CONTRAST: ICD-10-PCS | Performed by: INTERNAL MEDICINE

## 2018-02-27 PROCEDURE — 80048 BASIC METABOLIC PNL TOTAL CA: CPT | Performed by: PHYSICIAN ASSISTANT

## 2018-02-27 PROCEDURE — 93306 TTE W/DOPPLER COMPLETE: CPT

## 2018-02-27 PROCEDURE — 36415 COLL VENOUS BLD VENIPUNCTURE: CPT | Performed by: PHYSICIAN ASSISTANT

## 2018-02-27 PROCEDURE — 85610 PROTHROMBIN TIME: CPT | Performed by: PHYSICIAN ASSISTANT

## 2018-02-27 PROCEDURE — 99152 MOD SED SAME PHYS/QHP 5/>YRS: CPT

## 2018-02-27 PROCEDURE — C1894 INTRO/SHEATH, NON-LASER: HCPCS

## 2018-02-27 PROCEDURE — 85347 COAGULATION TIME ACTIVATED: CPT

## 2018-02-27 PROCEDURE — 94760 N-INVAS EAR/PLS OXIMETRY 1: CPT

## 2018-02-27 PROCEDURE — 74011250637 HC RX REV CODE- 250/637: Performed by: INTERNAL MEDICINE

## 2018-02-27 PROCEDURE — 77030004534 HC CATH ANGI DX INFN CARD -A

## 2018-02-27 PROCEDURE — 85730 THROMBOPLASTIN TIME PARTIAL: CPT | Performed by: PHYSICIAN ASSISTANT

## 2018-02-27 PROCEDURE — 85027 COMPLETE CBC AUTOMATED: CPT | Performed by: PHYSICIAN ASSISTANT

## 2018-02-27 PROCEDURE — 77030015766

## 2018-02-27 PROCEDURE — 65660000000 HC RM CCU STEPDOWN

## 2018-02-27 PROCEDURE — 77030019569 HC BND COMPR RAD TERU -B

## 2018-02-27 PROCEDURE — 77010033678 HC OXYGEN DAILY

## 2018-02-27 PROCEDURE — 99153 MOD SED SAME PHYS/QHP EA: CPT

## 2018-02-27 PROCEDURE — C1769 GUIDE WIRE: HCPCS

## 2018-02-27 PROCEDURE — 4A023N7 MEASUREMENT OF CARDIAC SAMPLING AND PRESSURE, LEFT HEART, PERCUTANEOUS APPROACH: ICD-10-PCS | Performed by: INTERNAL MEDICINE

## 2018-02-27 RX ORDER — FENTANYL CITRATE 50 UG/ML
25-100 INJECTION, SOLUTION INTRAMUSCULAR; INTRAVENOUS
Status: DISCONTINUED | OUTPATIENT
Start: 2018-02-27 | End: 2018-02-27

## 2018-02-27 RX ORDER — HEPARIN SODIUM 200 [USP'U]/100ML
3 INJECTION, SOLUTION INTRAVENOUS CONTINUOUS
Status: DISCONTINUED | OUTPATIENT
Start: 2018-02-27 | End: 2018-02-27

## 2018-02-27 RX ORDER — MIDAZOLAM HYDROCHLORIDE 1 MG/ML
.5-5 INJECTION, SOLUTION INTRAMUSCULAR; INTRAVENOUS
Status: DISCONTINUED | OUTPATIENT
Start: 2018-02-27 | End: 2018-02-27

## 2018-02-27 RX ORDER — MORPHINE SULFATE 4 MG/ML
2 INJECTION, SOLUTION INTRAMUSCULAR; INTRAVENOUS ONCE
Status: DISCONTINUED | OUTPATIENT
Start: 2018-02-27 | End: 2018-02-27

## 2018-02-27 RX ORDER — LIDOCAINE HYDROCHLORIDE 20 MG/ML
1-20 INJECTION, SOLUTION INFILTRATION; PERINEURAL
Status: DISCONTINUED | OUTPATIENT
Start: 2018-02-27 | End: 2018-03-02 | Stop reason: HOSPADM

## 2018-02-27 RX ORDER — MAG HYDROX/ALUMINUM HYD/SIMETH 200-200-20
30 SUSPENSION, ORAL (FINAL DOSE FORM) ORAL
Status: DISCONTINUED | OUTPATIENT
Start: 2018-02-27 | End: 2018-03-02 | Stop reason: HOSPADM

## 2018-02-27 RX ADMIN — METOPROLOL TARTRATE 25 MG: 25 TABLET ORAL at 08:21

## 2018-02-27 RX ADMIN — HEPARIN SODIUM 3 ML/HR: 200 INJECTION, SOLUTION INTRAVENOUS at 11:48

## 2018-02-27 RX ADMIN — LIDOCAINE HYDROCHLORIDE 60 MG: 20 INJECTION, SOLUTION INFILTRATION; PERINEURAL at 11:48

## 2018-02-27 RX ADMIN — HEPARIN SODIUM 2 ML: 10000 INJECTION, SOLUTION INTRAVENOUS; SUBCUTANEOUS at 12:03

## 2018-02-27 RX ADMIN — MIDAZOLAM HYDROCHLORIDE 1 MG: 1 INJECTION, SOLUTION INTRAMUSCULAR; INTRAVENOUS at 11:48

## 2018-02-27 RX ADMIN — Medication 5 ML: at 05:12

## 2018-02-27 RX ADMIN — MONTELUKAST SODIUM 10 MG: 10 TABLET, FILM COATED ORAL at 08:21

## 2018-02-27 RX ADMIN — PANTOPRAZOLE SODIUM 40 MG: 40 TABLET, DELAYED RELEASE ORAL at 08:21

## 2018-02-27 RX ADMIN — BUDESONIDE 500 MCG: 0.5 INHALANT RESPIRATORY (INHALATION) at 07:21

## 2018-02-27 RX ADMIN — IOPAMIDOL 85 ML: 755 INJECTION, SOLUTION INTRAVENOUS at 12:24

## 2018-02-27 RX ADMIN — METOPROLOL TARTRATE 25 MG: 25 TABLET ORAL at 17:24

## 2018-02-27 RX ADMIN — NITROGLYCERIN 1 INCH: 20 OINTMENT TOPICAL at 01:41

## 2018-02-27 RX ADMIN — SODIUM CHLORIDE 75 ML/HR: 900 INJECTION, SOLUTION INTRAVENOUS at 06:53

## 2018-02-27 RX ADMIN — NITROGLYCERIN 1 INCH: 20 OINTMENT TOPICAL at 09:47

## 2018-02-27 RX ADMIN — Medication 10 ML: at 14:47

## 2018-02-27 RX ADMIN — ACETAMINOPHEN 650 MG: 325 TABLET ORAL at 08:27

## 2018-02-27 RX ADMIN — ASPIRIN 81 MG: 81 TABLET, COATED ORAL at 08:22

## 2018-02-27 RX ADMIN — ALBUTEROL SULFATE 2.5 MG: 2.5 SOLUTION RESPIRATORY (INHALATION) at 20:04

## 2018-02-27 RX ADMIN — Medication 10 ML: at 21:39

## 2018-02-27 RX ADMIN — ALBUTEROL SULFATE 2.5 MG: 2.5 SOLUTION RESPIRATORY (INHALATION) at 16:55

## 2018-02-27 RX ADMIN — BUDESONIDE 500 MCG: 0.5 INHALANT RESPIRATORY (INHALATION) at 20:04

## 2018-02-27 RX ADMIN — ALBUTEROL SULFATE 2.5 MG: 2.5 SOLUTION RESPIRATORY (INHALATION) at 07:21

## 2018-02-27 NOTE — PROGRESS NOTES
Bedside and Verbal shift change report given to self (oncoming nurse) by Trev Raya RN (offgoing nurse). Report included the following information SBAR, Kardex, MAR and Recent Results.

## 2018-02-27 NOTE — PROCEDURES
[address]    Arely Villalba  MR#: [id number]  : 1940  ACCOUNT #: [de-identified]   DATE OF SERVICE: 2018    PROCEDURE PERFORMED:  Left heart catheterization, selective coronary angiography, left ventriculogram.    INDICATION:  Abnormal troponin. COMPLICATIONS:  None. ACCESS:  From the right radial.  The patient had severely tortuous brachiocephalic artery. We were only able to image the left system from the right radial. The right system was imaged from the right femoral, as well as the LV gram.      CATHETERS:  TIG 4, pigtail and [address] were used. TIME:  11:37 to 12:11. SEDATION:  1 mg Versed was given by Prabha Srivastava. MEASUREMENTS:  Aortic pressure 89/62. LVEDP of 8. CONTRAST:  85 mL. FINDINGS:  Left ventriculogram done in BAKER projection shows EF of 50%, with some asynchronous wall motion consistent with pacing. There was no gradient on pullback. Left main arises normally. It is a large caliber vessel, it divides into LAD and circumflex. There is no disease in the left main. LAD courses the apex. It is a large caliber vessel supplying 3 diagonals. There is minimal nonobstructive disease in the LAD system. Circumflex system and AV groove supplies 2 OMs and is nondominant. The circumflex has mild nonobstructive 10% disease. Right coronary artery is a large patulous artery that is dominant, supplies a large posterior descending and posterolateral.  There is diffuse mild disease in the right system. CONCLUSION:  Mild nonobstructive coronary artery disease, with preserved left ventricular systolic function. Continue medical therapy.       MD NORA Trivedi / CARLY  D: 2018 12:15     T: 2018 12:58  JOB #: [id number]

## 2018-02-27 NOTE — PROGRESS NOTES
TRANSFER - OUT REPORT:    Verbal report given to baljit rn/ tony rn(name) on Eduardo Washington  being transferred to cpru(unit) for routine progression of care       Report consisted of patients Situation, Background, Assessment and   Recommendations(SBAR). Information from the following report(s) SBAR was reviewed with the receiving nurse. Lines:   Peripheral IV 02/27/18 Right Hand (Active)   Site Assessment Clean, dry, & intact 2/27/2018  4:20 AM   Phlebitis Assessment 0 2/27/2018  4:20 AM   Infiltration Assessment 0 2/27/2018  4:20 AM   Dressing Status Clean, dry, & intact 2/27/2018  4:20 AM   Dressing Type Tape;Transparent 2/27/2018  4:20 AM   Hub Color/Line Status Patent; Flushed; Infusing 2/27/2018  4:20 AM   Alcohol Cap Used No 2/27/2018  4:20 AM        Opportunity for questions and clarification was provided.       Patient transported with:   Registered Nurse   [facility]  [address] [facility]  No intervention  Right wrist tr band 12ml  Right femoral artery 5fr sheath sutured in attached to art line  No bleeding or hematoma  Versed 1mg  [email or web address]

## 2018-02-27 NOTE — PROGRESS NOTES
TRANSFER - IN REPORT:    Verbal report received from Bobby Valera RN on Brandt Montana  being received from [address] [place] [address] for routine progression of care. Report consisted of patients Situation, Background, Assessment and   Recommendations(SBAR). Information from the following reports was reviewed: Kardex, Procedure Summary, MAR and Recent Results. Opportunity for questions and clarification was provided. Assessment completed upon patients arrival to unit and care assumed. Patient received to room 312 and assessment completed. Patient connected to telemetry monitor and eagle with BP cycling every 15 minutes. Patient oriented to room and plan of care reviewed. Patient voiced understanding of bedrest. Right groin site benign, dressing dry and intact, no hematoma. R. Radial with 10 mls of air. Patient provided with clear liquids. Patient voiced understanding to use call light to communicate needs.

## 2018-02-27 NOTE — PROGRESS NOTES
Radial compression band removed at 1645 after slowly reducing air from 12 cc to zero as per hospital protocol. No bleeding or hematoma noted. 2 x 2 gauze with tegaderm placed over puncture site. The affected extremity is warm and dry to the touch. Frequent vital signs documented per flowsheet. Patient instructed to call if any bleeding noted on gauze. Patient verbalized understanding the nursing instructions.

## 2018-02-27 NOTE — PROGRESS NOTES
5FR arterial sheath removed from right groin using sterile technique. Manual pressure held over site for 20 minutes. Hemostasis achieved. Right groin without bleeding without hematoma. Sterile gauze placed over site and secured with tegaderm. 5lb sandbag placed over site. Patient instructed to keep right leg straight and still and head on pillow. Patient verbalizes understanding.

## 2018-02-27 NOTE — PROGRESS NOTES
Verbal bedside report received from Patricia Oliveira [facility]. Assumed care of patient. Heparin IV drip verified at bedside with outgoing RN.

## 2018-02-27 NOTE — PHYSICIAN ADVISORY
Letter of Determination: Inpatient Status Appropriate    This patient was originally hospitalized as Inpatient Status on 2/26/2018 for non-ST segment elevation myocardial infarction. This patient is appropriate for Inpatient Admission based on medical necessity. The patient's stay was medically necessary based on laboratory studies significant for troponin-I level to 1.83 ng/ml, and treatment plan which included intravenous heparin by continuous infusion, and urgent cardiac catheterization. Consistent with CMS guidelines, patient meets for inpatient status. It is our recommendation that this patient's hospitalization status should be INPATIENT status.      The final decision regarding the patient's hospitalization status depends on the attending physician's judgement.     Radha Villalobos MD, STAN,   Physician Alex Torres.

## 2018-02-27 NOTE — PROGRESS NOTES
Bedside and Verbal shift change report given to [facility], [facility], RN (oncoming nurse) by self (offgoing nurse). Report included the following information SBAR, Kardex, MAR and Recent Results.

## 2018-02-27 NOTE — PROGRESS NOTES
[facility] CARDIOLOGY PROGRESS NOTE           2/27/2018 12:35 PM    Admit Date: 2/26/2018      Subjective:   Patient with stable CAD and moderate mLAD and otherwise normal.  EF 50%. ROS:  Cardiovascular:  As noted above    Objective:      Vitals:    02/27/18 0821 02/27/18 0922 02/27/18 1213 02/27/18 1234   BP: 121/70 120/70 128/77 149/80   Pulse: 76 82 76 77   Resp:  18 16    Temp:  98 °F (36.7 °C)     SpO2:  100% 98% 95%   Weight:           Physical Exam:  General-No Acute Distress  Neck- supple, no JVD  CV- regular rate and rhythm no MRG  Lung- clear bilaterally  Abd- soft, nontender, nondistended  Ext- no edema bilaterally. Skin- warm and dry    Data Review:   Recent Labs      02/27/18   0833  02/27/18   0432  02/27/18   0030  02/26/18 2002 02/26/18   1908   NA   --   140   --    --   140   K   --   3.9   --    --   4.3   BUN   --   27*   --    --   26*   CREA   --   0.90   --    --   1.09   GLU   --   88   --    --   103*   WBC   --   5.3   --   6.0   --    HGB   --   10.7*   --   12.4*   --    HCT   --   32.2*   --   37.1*   --    PLT   --   218   --   225   --    INR   --   1.8   --   1.6   --    TROIQ  1.44*   --   1.67*   --   1.83*   CHOL   --   151   --    --    --    LDLC   --   65.8   --    --    --    HDL   --   76*   --    --    --        Assessment/Plan:     Principal Problem:    Elevated troponin level (2/26/2018)    Mild CAD and no lesion to account for elevated troponin. Check echo and if stable later today. Plan DC with close follow up in office. Active Problems:    Chest pain (63/01/7963)    Uncertain etiology. Check echo to assess effusion. Permanent atrial fibrillation ([address] (10/20/2015)    S/P AV node ablation and resume warfarin      HTN (hypertension) (10/20/2015)    Stable    Likely DC home if echo stable and no effusion.         Joseph Key MD  2/27/2018 12:35 PM

## 2018-02-27 NOTE — H&P
Rajeev Masseil Owusu Dress  MR#: [id number]  : 1940  ACCOUNT #: [de-identified]   ADMIT DATE: 2018    CHIEF COMPLAINT:  Chest pain. HISTORY OF PRESENT ILLNESS:  The patient is a 76 year gentleman who was just discharged last Friday after having a BiV pacemaker and AV node ablation. He was at rehab when he developed chest pain last night. He had 2 episodes that were left-sided, radiated to his shoulder, were described as a tightness. There were no aggravating or alleviating factors. They were associated with shortness of breath and diaphoresis. He had a mild episode of chest pain, but since nitroglycerin was placed in the [facility] ER, he has had no further chest pain. PAST MEDICAL HISTORY:  Permanent atrial fibrillation with a recent Bi-V pacemaker placement and AV janet ablation as noted above. He also has peripheral vascular disease with a triple A, mitral valve regurgitation, chronic renal failure, chronic diastolic congestive heart failure, and COPD. SOCIAL HISTORY:  Does not smoke. FAMILY HISTORY:  Negative for premature coronary artery disease. REVIEW OF SYSTEMS:  GENERAL:  No fevers or chills. HEAD:  No headache. NOSE:  No rhinorrhea. RESPIRATORY:  No cough. GASTROINTESTINAL:  He has had some diarrhea. GENITOURINARY:  No dysuria. ENDOCRINE:  No temperature intolerance. MUSCULOSKELETAL:  No myalgias or arthralgias. SKIN:  No rashes. EYES:  No visual changes  NEUROLOGIC:  No CVA symptoms. PHYSICAL EXAMINATION:  VITAL SIGNS:  Blood pressure 133/80, heart rate 75. GENERAL:  Well-developed, well-nourished male in no acute distress. PSYCHIATRIC:  Alert and oriented x3 with appropriate mood and affect. HEENT:  Sclerae clear. HEART:  S1, S2 is regular. LUNGS:  Clear. ABDOMEN:  Soft. EXTREMITIES:  No edema. SKIN:  Warm and dry. NECK:  Supple. Trachea midline. EKG shows a paced rhythm.     LABORATORIES: Peak troponin is 1.4. This is the only labs sent from [facility]. We are requesting records at this time. DISCHARGE MEDICATIONS:  When he left the hospital a few days ago include Prilosec 20 mg a day, Coumadin 5 mg 4 days a week and 2.5 the other days, Singulair 10 mg a day, Lasix 40 mg a day, Mucinex 600 mg as needed, Breo inhaler as needed, and albuterol inhaler as needed. ASSESSMENT:  A 68-year-old gentleman with non-ST elevation myocardial infarction. He is currently chest pain free. We will hydrate him overnight. Plan for catheterization tomorrow. We will continue nitro placed and aspirin and depending on his INR, will make the determination of risk/benefit analysis with heparin.       MD DARRYL Damico/CARLY  D: 02/26/2018 17:56     T: 02/26/2018 18:21  JOB #: [id number]

## 2018-02-27 NOTE — PROGRESS NOTES
Bedside and Verbal shift change report given to self (oncoming nurse) by Benji Gamboa RN (offgoing nurse). Report included the following information SBAR, Kardex, MAR and Recent Results.

## 2018-02-27 NOTE — PROGRESS NOTES
Care Management Interventions  PCP Verified by CM: Yes  Transition of Care Consult (CM Consult): Discharge Planning, SNF  Current Support Network: Other (lives with niece and nephew)  Confirm Follow Up Transport: Family  Plan discussed with Pt/Family/Caregiver: Yes  Freedom of Choice Offered: Yes  Discharge Location  Discharge Placement: Skilled nursing facility  Met with patient for d/c planning. Patient alert and oriented x 3, requires some assistance for ADL's and lives with niece and nephew. Patient has cane at home and was recently discharged to [facility] 2/23. I called and spoke with Sarita Loredo the admission coordinator at [facility] and patient can return but will require a new pre-cert from [facility]. Patient prefers to go to [facility] but spoke with Karolyn Reddy and they have no beds. Will need PT/OT ordered to send information to [facility]. Will let patient know no beds at [facility].  CM following

## 2018-02-27 NOTE — PROGRESS NOTES
Notified by Singh Francois that due to patient being a hard stick, CBC and PTT could not be drawn. Notified oncoming RN, Benji Gamboa.

## 2018-02-28 LAB
ANION GAP SERPL CALC-SCNC: 9 MMOL/L (ref 7–16)
BUN SERPL-MCNC: 23 MG/DL (ref 8–23)
CALCIUM SERPL-MCNC: 8.4 MG/DL (ref 8.3–10.4)
CHLORIDE SERPL-SCNC: 108 MMOL/L (ref 98–107)
CO2 SERPL-SCNC: 23 MMOL/L (ref 21–32)
CREAT SERPL-MCNC: 1.09 MG/DL (ref 0.8–1.5)
ERYTHROCYTE [DISTWIDTH] IN BLOOD BY AUTOMATED COUNT: 13.4 % (ref 11.9–14.6)
GLUCOSE SERPL-MCNC: 109 MG/DL (ref 65–100)
HCT VFR BLD AUTO: 32.4 % (ref 41.1–50.3)
HGB BLD-MCNC: 10.7 G/DL (ref 13.6–17.2)
INR PPP: 1.7
INR PPP: 1.8
MCH RBC QN AUTO: 28.5 PG (ref 26.1–32.9)
MCHC RBC AUTO-ENTMCNC: 33 G/DL (ref 31.4–35)
MCV RBC AUTO: 86.4 FL (ref 79.6–97.8)
PLATELET # BLD AUTO: 212 K/UL (ref 150–450)
PMV BLD AUTO: 9.4 FL (ref 10.8–14.1)
POTASSIUM SERPL-SCNC: 3.9 MMOL/L (ref 3.5–5.1)
PROTHROMBIN TIME: 19.8 SEC (ref 11.5–14.5)
PROTHROMBIN TIME: 20.4 SEC (ref 11.5–14.5)
RBC # BLD AUTO: 3.75 M/UL (ref 4.23–5.67)
SODIUM SERPL-SCNC: 140 MMOL/L (ref 136–145)
WBC # BLD AUTO: 5.7 K/UL (ref 4.3–11.1)

## 2018-02-28 PROCEDURE — 85027 COMPLETE CBC AUTOMATED: CPT | Performed by: PHYSICIAN ASSISTANT

## 2018-02-28 PROCEDURE — 74011250637 HC RX REV CODE- 250/637: Performed by: NURSE PRACTITIONER

## 2018-02-28 PROCEDURE — 94640 AIRWAY INHALATION TREATMENT: CPT

## 2018-02-28 PROCEDURE — 94760 N-INVAS EAR/PLS OXIMETRY 1: CPT

## 2018-02-28 PROCEDURE — 97161 PT EVAL LOW COMPLEX 20 MIN: CPT

## 2018-02-28 PROCEDURE — 97535 SELF CARE MNGMENT TRAINING: CPT

## 2018-02-28 PROCEDURE — 74011000250 HC RX REV CODE- 250: Performed by: INTERNAL MEDICINE

## 2018-02-28 PROCEDURE — 80048 BASIC METABOLIC PNL TOTAL CA: CPT | Performed by: PHYSICIAN ASSISTANT

## 2018-02-28 PROCEDURE — 36415 COLL VENOUS BLD VENIPUNCTURE: CPT | Performed by: PHYSICIAN ASSISTANT

## 2018-02-28 PROCEDURE — 97166 OT EVAL MOD COMPLEX 45 MIN: CPT

## 2018-02-28 PROCEDURE — 97530 THERAPEUTIC ACTIVITIES: CPT

## 2018-02-28 PROCEDURE — 74011250637 HC RX REV CODE- 250/637: Performed by: INTERNAL MEDICINE

## 2018-02-28 PROCEDURE — 85610 PROTHROMBIN TIME: CPT | Performed by: PHYSICIAN ASSISTANT

## 2018-02-28 PROCEDURE — 65660000000 HC RM CCU STEPDOWN

## 2018-02-28 RX ORDER — WARFARIN SODIUM 5 MG/1
5 TABLET ORAL
Status: COMPLETED | OUTPATIENT
Start: 2018-02-28 | End: 2018-02-28

## 2018-02-28 RX ORDER — WARFARIN SODIUM 5 MG/1
5 TABLET ORAL EVERY EVENING
Status: DISCONTINUED | OUTPATIENT
Start: 2018-02-28 | End: 2018-03-02 | Stop reason: HOSPADM

## 2018-02-28 RX ADMIN — ACETAMINOPHEN 650 MG: 325 TABLET ORAL at 16:21

## 2018-02-28 RX ADMIN — BUDESONIDE 500 MCG: 0.5 INHALANT RESPIRATORY (INHALATION) at 08:00

## 2018-02-28 RX ADMIN — ALBUTEROL SULFATE 2.5 MG: 2.5 SOLUTION RESPIRATORY (INHALATION) at 14:45

## 2018-02-28 RX ADMIN — ALBUTEROL SULFATE 2.5 MG: 2.5 SOLUTION RESPIRATORY (INHALATION) at 08:00

## 2018-02-28 RX ADMIN — ASPIRIN 81 MG: 81 TABLET, COATED ORAL at 10:09

## 2018-02-28 RX ADMIN — ALUMINUM HYDROXIDE, MAGNESIUM HYDROXIDE, AND SIMETHICONE 30 ML: 200; 200; 20 SUSPENSION ORAL at 15:40

## 2018-02-28 RX ADMIN — MONTELUKAST SODIUM 10 MG: 10 TABLET, FILM COATED ORAL at 10:08

## 2018-02-28 RX ADMIN — ACETAMINOPHEN 650 MG: 325 TABLET ORAL at 10:11

## 2018-02-28 RX ADMIN — ALBUTEROL SULFATE 2.5 MG: 2.5 SOLUTION RESPIRATORY (INHALATION) at 20:39

## 2018-02-28 RX ADMIN — Medication 10 ML: at 14:00

## 2018-02-28 RX ADMIN — Medication 10 ML: at 22:00

## 2018-02-28 RX ADMIN — PANTOPRAZOLE SODIUM 40 MG: 40 TABLET, DELAYED RELEASE ORAL at 05:45

## 2018-02-28 RX ADMIN — BUDESONIDE 500 MCG: 0.5 INHALANT RESPIRATORY (INHALATION) at 20:39

## 2018-02-28 RX ADMIN — Medication 5 ML: at 05:43

## 2018-02-28 RX ADMIN — METOPROLOL TARTRATE 25 MG: 25 TABLET ORAL at 17:04

## 2018-02-28 RX ADMIN — WARFARIN SODIUM 5 MG: 5 TABLET ORAL at 17:04

## 2018-02-28 RX ADMIN — METOPROLOL TARTRATE 25 MG: 25 TABLET ORAL at 10:09

## 2018-02-28 RX ADMIN — WARFARIN SODIUM 5 MG: 5 TABLET ORAL at 02:48

## 2018-02-28 NOTE — PROGRESS NOTES
Clarified with [facility] [place] [place] regarding patient's coumadin dosing different from home dose.  Orders received to to give 5 mg coumadin this PM and will reassess in AM.

## 2018-02-28 NOTE — PROGRESS NOTES
Verbal bedside report given to Maty Rodriguez and Marianela oncoming RN. Patient's situation, background, assessment and recommendations provided. Opportunity for questions provided. Oncoming RN assumed care of patient. R. Groin and R. Radial sites visualized.

## 2018-02-28 NOTE — PROGRESS NOTES
Notified PT at 11 this morning of need for patient to be seen this morning. He is on the list to be seen. Once patient is seen by PT will send notes to [facility] to [facility] [facility] [place]. Addendurem Faxed clinical to [address] [place] [address] and rehab for them to start pre-cert with [facility].

## 2018-02-28 NOTE — PROGRESS NOTES
[facility] CARDIOLOGY PROGRESS NOTE           2/28/2018 12:35 PM    Admit Date: 2/26/2018      Subjective:   Patient with stable CAD and moderate mLAD and otherwise normal.  EF 50%. ROS:  Cardiovascular:  As noted above    Objective:      Vitals:    02/28/18 0448 02/28/18 0800 02/28/18 0858 02/28/18 1008   BP: 100/67  99/61 104/57   Pulse: 75  76    Resp: 20  17    Temp: 98.3 °F (36.8 °C)  98.3 °F (36.8 °C)    SpO2: 98% 96% 98%    Weight: 67.4 kg (148 lb 9.6 oz)          Physical Exam:  General-No Acute Distress  Neck- supple, no JVD  CV- regular rate and rhythm no MRG  Lung- clear bilaterally  Abd- soft, nontender, nondistended  Ext- no edema bilaterally. Skin- warm and dry    Data Review:   Recent Labs      02/28/18   0427  02/27/18   0833  02/27/18   0432  02/27/18   0030   NA  140   --   140   --    K  3.9   --   3.9   --    BUN  23   --   27*   --    CREA  1.09   --   0.90   --    GLU  109*   --   88   --    WBC  5.7   --   5.3   --    HGB  10.7*   --   10.7*   --    HCT  32.4*   --   32.2*   --    PLT  212   --   218   --    INR  1.8   --   1.8   --    TROIQ   --   1.44*   --   1.67*   CHOL   --    --   151   --    LDLC   --    --   65.8   --    HDL   --    --   76*   --        Assessment/Plan:     Principal Problem:    Elevated troponin level (2/26/2018)    Mild CAD and no lesion to account for elevated troponin. Likely related to recent ablation. No additional work necessary    Active Problems:    Chest pain (99/38/5209)    Uncertain etiology.   ? GI and will treat with PPI      Permanent atrial fibrillation ([address] (10/20/2015)    S/P AV node ablation and resume warfarin      HTN (hypertension) (10/20/2015)    Lizzette Bates MD  2/28/2018 12:35 PM

## 2018-02-28 NOTE — PROGRESS NOTES
Problem: Self Care Deficits Care Plan (Adult)  Goal: *Acute Goals and Plan of Care (Insert Text)  1. Patient will complete full body bathing and dressing with modified independence, additional time, and adaptive equipment as needed. 2. Patient will complete toileting with modified independence. 3. Patient will tolerate 23 minutes of OT treatment with less than 2 rest breaks to increase activity tolerance for ADLs. 4. Patient will complete functional transfers with modified independence and adaptive equipment as needed. 5. Patient will complete grooming tasks in standing at sink level after setup with no loss of balance. Timeframe: 7 visits       OCCUPATIONAL THERAPY: Initial Assessment, Daily Note, Treatment Day: Day of Assessment and AM 2/28/2018  INPATIENT: Hospital Day: 3  Payor: Ahmet Kaba / Plan: [facility] HUMANA MEDICARE CHOICE PPO/PFFS / Product Type: Managed Care Medicare /      NAME/AGE/GENDER: Chetan Mcbride is a 76 y.o. male   PRIMARY DIAGNOSIS:  ACS  Elevated Troponin  A-Fib  NSTEMI (non-ST elevated myocardial infarction) (HCC) Elevated troponin level Elevated troponin level        ICD-10: Treatment Diagnosis:    · Generalized Muscle Weakness (M62.81)  · Other lack of cordination (R27.8)  · Difficulty in walking, Not elsewhere classified (R26.2)   Precautions/Allergies:    falls, Levaquin [levofloxacin] and Prednisone      ASSESSMENT:     Mr. Nhi Watkins is 77 yo AA R dominant male who was recently hospitalized here at [facility] last week for a heart procedure. Had discharged to rehab in [place] on 2/23. Had an acute NSTEMI and underwent hearth cath. A & O x4. Pleasant and cooperative, but talkative. Reports he has been in rehab and doing well until he had chest pain, but normally lives in a 1 level home with 4 steps or ramp access with his niece and nephew who help with ADLs and IADLs. Has been sponge bathing in front of the sink. Used a SC for ambulation.   Reports he would like to go home, but is unsure he will have support there. Today, presents supine in bed watching TV. Agreeable to OT evaluation and getting up. Asked to have help using the urinal, declined offer to walk into the bathroom to void in toilet. Given warm wash cloth and pt washed face and hands. Encouraged pt to attempt to do more for himself if he really wants to return home. Reports still having some abdominal and headache pains, 6/10. RN alert and aware. B UEs are WFLs for basic self care tasks, though R wrist is swollen from access site. Pt can make complete fists, but reports pain increases in his R wrist when he does so. Bed mobility at [facility], unsteady with dynamic sitting balance. Sit to stand at Lisha Pitcher A and needed mod A to position and hold urinal in place for voiding. OT emptied and cleaned urinal.  Min A to take steps over to the recliner. Unsteady on feet. All needs in reach and feet elevated. Pt asked OT to plug in his sell phone for him. OT did and pt was talking on the phone upon OT leaving. Pt is functioning below his normal baseline and would benefit from skilled OT to maximize his independence with self care, activity tolerance, safety and functional mobility. Will follow in acute. Pt would benefit from further rehab as well. Discussed above findings with RN and SW. This section established at most recent assessment   PROBLEM LIST (Impairments causing functional limitations):  1. Decreased Strength  2. Decreased ADL/Functional Activities  3. Decreased Transfer Abilities  4. Decreased Ambulation Ability/Technique  5. Decreased Balance  6. Increased Pain  7. Decreased Activity Tolerance  8. Edema/Girth  9. Decreased Skin Integrity/Hygeine   INTERVENTIONS PLANNED: (Benefits and precautions of occupational therapy have been discussed with the patient.)  1. Activities of daily living training  2. Adaptive equipment training  3. Balance training  4. Clothing management  5. Cognitive training  6.  Community reintergration  7. Donning&doffing training  8. Group therapy  9. Therapeutic activity  10. Therapeutic exercise  11. Positioning to decreased swelling     TREATMENT PLAN: Frequency/Duration: Follow patient 3x per week to address above goals. Rehabilitation Potential For Stated Goals: Good     RECOMMENDED REHABILITATION/EQUIPMENT: (at time of discharge pending progress): Due to the probability of continued deficits (see above) this patient will likely need continued skilled occupational therapy after discharge. Equipment:    TBD based on progress, RW may be beneficial              OCCUPATIONAL PROFILE AND HISTORY:   History of Present Injury/Illness (Reason for Referral): HISTORY OF PRESENT ILLNESS:  The patient is a 76 year gentleman who was just discharged last Friday after having a BiV pacemaker and AV node ablation. He was at rehab when he developed chest pain last night. He had 2 episodes that were left-sided, radiated to his shoulder, were described as a tightness. There were no aggravating or alleviating factors. They were associated with shortness of breath and diaphoresis. He had a mild episode of chest pain, but since nitroglycerin was placed in the [facility] ER, he has had no further chest pain. Past Medical History/Comorbidities:   Mr. Magui Guy  has a past medical history of Chronic obstructive pulmonary disease ([facility] Dyspnea (7/21/2016); Gastrointestinal disorder; and Heart failure ([facility] [address] [facility] Mr. Magui Guy  has a past surgical history that includes hx orthopaedic and hx gi.   Social History/Living Environment:   Home Environment: Private residence  # Steps to Enter: 4  Wheelchair Ramp: Yes  One/Two Story Residence: One story  Living Alone: No  Support Systems: Family member(s), Other (comments) (lives with niece and nephew)  Patient Expects to be Discharged to[de-identified] Rehabilitation facility  Current DME Used/Available at Home: [place], straight, Walker, rollator, Walker  Tub or Shower Type: Tub/Shower combination (but only sponge bathes)  Prior Level of Function/Work/Activity:  Pt lives in a 1 level home with 4 steps or ramp access with his niece and nephew who help with ADLs and IADLs. Has been sponge bathing in front of the sink. Used a SC for ambulation. Does not drive. Wants to move into his own place. Dominant Side:         RIGHT   Number of Personal Factors/Comorbidities that affect the Plan of Care: Extensive review of physical, cognitive, and psychosocial performance (3+):  HIGH COMPLEXITY   ASSESSMENT OF OCCUPATIONAL PERFORMANCE[de-identified]   Activities of Daily Living:         Voiding at bedside  Basic ADLs (From Assessment) Complex ADLs (From Assessment)   Basic ADL  Feeding: Setup, Additional time  Oral Facial Hygiene/Grooming: Minimum assistance  Bathing: Moderate assistance  Upper Body Dressing: Minimum assistance  Lower Body Dressing: Moderate assistance  Toileting: Moderate assistance Instrumental ADL  Meal Preparation: Maximum assistance  Homemaking: Maximum assistance  Medication Management: Minimum assistance  Financial Management: Minimum assistance   Grooming/Bathing/Dressing Activities of Daily Living     Cognitive Retraining  Safety/Judgement: Awareness of environment; Fall prevention           Toileting  Toileting Assistance: Moderate assistance  Bladder Hygiene: Moderate assistance  Bowel Hygiene: Moderate assistance  Clothing Management: Moderate assistance  Cues: Tactile cues provided;Physical assistance for pants down;Physical assistance for pants up;Verbal cues provided;Visual cues provided     Functional Transfers  Toilet Transfer :  Moderate assistance  Tub Transfer: Maximum assistance  Shower Transfer: Minimum assistance     Bed/Mat Mobility  Rolling: Stand-by assistance  Supine to Sit: Contact guard assistance  Sit to Supine:  (up to recliner after using urinal and remained in recliner)  Sit to Stand: Minimum assistance  Bed to Chair: Minimum assistance  Scooting: Contact guard assistance Most Recent Physical Functioning:   Gross Assessment:  AROM: Generally decreased, functional (R wrist swollen at Cath incertion site)  Strength: Generally decreased, functional  Coordination: Generally decreased, functional  Tone: Normal  Sensation: Intact               Posture:     Balance:  Sitting: Impaired  Sitting - Static: Good (unsupported)  Sitting - Dynamic: Fair (occasional)  Standing: Impaired;Pull to stand; With support  Standing - Static: Fair  Standing - Dynamic : Fair (-) Bed Mobility:  Rolling: Stand-by assistance  Supine to Sit: Contact guard assistance  Sit to Supine:  (up to recliner after using urinal and remained in recliner)  Scooting: Contact guard assistance  Wheelchair Mobility:     Transfers:  Sit to Stand: Minimum assistance  Stand to Sit: Minimum assistance  Bed to Chair: Minimum assistance          Patient Vitals for the past 6 hrs:   BP BP Patient Position SpO2 Pulse   02/28/18 0448 100/67 At rest 98 % 75   02/28/18 0800 - - 96 % -   02/28/18 0858 99/61 - 98 % 76   02/28/18 1008 104/57 - - -       Mental Status  Neurologic State: Alert  Orientation Level: Appropriate for age, Oriented X4  Cognition: Appropriate decision making, Appropriate for age attention/concentration, Appropriate safety awareness, Follows commands  Perception: Appears intact  Perseveration: No perseveration noted  Safety/Judgement: Awareness of environment, Fall prevention                          Physical Skills Involved:  1. Range of Motion  2. Balance  3. Strength  4. Activity Tolerance  5. Fine Motor Control  6. Pain (acute)  7. Edema  8. Skin Integrity Cognitive Skills Affected (resulting in the inability to perform in a timely and safe manner): 1. none Psychosocial Skills Affected:  1. Habits/Routines  2. Environmental Adaptation  3. Emotional Regulation  4. Self-Awareness  5. Awareness of Others  6.  Social Roles   Number of elements that affect the Plan of Care: 5+:  HIGH COMPLEXITY   CLINICAL DECISION MAKIN[address] [place] [id number] AM-PAC 6 Clicks   Daily Activity Inpatient Short Form  How much help from another person does the patient currently need. .. Total A Lot A Little None   1. Putting on and taking off regular lower body clothing? [] 1   [x] 2   [] 3   [] 4   2. Bathing (including washing, rinsing, drying)? [] 1   [x] 2   [] 3   [] 4   3. Toileting, which includes using toilet, bedpan or urinal?   [] 1   [x] 2   [] 3   [] 4   4. Putting on and taking off regular upper body clothing? [] 1   [] 2   [x] 3   [] 4   5. Taking care of personal grooming such as brushing teeth? [] 1   [] 2   [x] 3   [] 4   6. Eating meals? [] 1   [] 2   [x] 3   [] 4   © , Trustees of [address] [place], [place] [id number], under license to [employer] All rights reserved      Score:  Initial: 15, completed 2018 Most Recent: X (Date: -- )    Interpretation of Tool:  Represents activities that are increasingly more difficult (i.e. Bed mobility, Transfers, Gait). Score 24 23 22-20 19-15 14-10 9-7 6     Modifier CH CI CJ CK CL CM CN      ? Self Care:     - CURRENT STATUS: CK - 40%-59% impaired, limited or restricted    - GOAL STATUS: CI - 1%-19% impaired, limited or restricted    - D/C STATUS:  ---------------To be determined---------------  Payor: HUMANA MEDICARE / Plan: [facility] HUMANA MEDICARE CHOICE PPO/PFFS / Product Type: Managed Care Medicare /      Medical Necessity:     · Patient demonstrates good rehab potential due to higher previous functional level. Reason for Services/Other Comments:  · Patient continues to require skilled intervention due to s/p above and decreased ADLs and mobility. Use of outcome tool(s) and clinical judgement create a POC that gives a: MODERATE COMPLEXITY         TREATMENT:   (In addition to Assessment/Re-Assessment sessions the following treatments were rendered)     Pre-treatment Symptoms/Complaints:  \"I really want to move out and get a place of my own. \"  Pain: Initial: Pain Intensity 1: 6  Pain Location 1: Head, Abdomen  Pain Intervention(s) 1: Ambulation/Increased Activity, Nurse notified, Repositioned, Rest (up in recliner)  Post Session:  unchanged     Self Care: (15 mins): Procedure(s) (per grid) utilized to improve and/or restore self-care/home management as related to dressing, bathing, toileting, grooming and self feeding. Required moderate visual, verbal, manual, tactile and   cueing to facilitate activities of daily living skills and compensatory activities. Therapeutic Activity: (    10 mins): Therapeutic activities including Bed transfers, Chair transfers, Ambulation on level ground and safety training to improve mobility, strength, balance, coordination and activity tolerance . Required moderate   to promote dynamic balance in standing. Evaluation: 15 mins    Braces/Orthotics/Lines/Etc:   · IV  · O2 Device: Room air  Treatment/Session Assessment:    · Response to Treatment:  tolerated well, unsteady on his feet, c/o pain, very talkative, seems needy. · Interdisciplinary Collaboration:   o Physical Therapist  o Occupational Therapist  o Registered Nurse  o   o Certified Nursing Assistant/Patient Care Technician  · After treatment position/precautions:   o Up in chair  o Bed/Chair-wheels locked  o Bed in low position  o Call light within reach  o RN notified  o B feet elevated, tray table beside pt, all needs in reach   · Compliance with Program/Exercises: Will assess as treatment progresses. Compliant today. · Recommendations/Intent for next treatment session: \"Next visit will focus on advancements to more challenging activities and reduction in assistance provided\".   Total Treatment Duration:  40 mins  OT Patient Time In/Time Out  Time In: 0920  Time Out: Wanda 39, OT  Osiris Mendez, MS, OTR/L

## 2018-02-28 NOTE — PROGRESS NOTES
Problem: Mobility Impaired (Adult and Pediatric)  Goal: *Acute Goals and Plan of Care (Insert Text)  LTG:  (1.)Mr. Nas Corral will move from supine to sit and sit to supine, scoot up and down and roll side to side in bed with INDEPENDENT within 7 day(s). (2.)Mr. Nas Corral will transfer from bed to chair and chair to bed with INDEPENDENT using the least restrictive device within 7 day(s). (3.)Mr. Nas Corral will ambulate with modified INDEPENDENCE for 500+ feet with the least restrictive device within 7 day(s). (4.)Mr. Nas Corral will perform standing static and dynamic balance activities x 8 minutes with SUPERVISION to improve safety within 7 day(s). (5.)Mr. Nas Corral will ascend and descend 4 stairs using one hand rail(s) with SUPERVISION to improve functional mobility and safety within 7 day(s). PHYSICAL THERAPY: Initial Assessment, PM 2/28/2018  INPATIENT: Hospital Day: 3  Payor: Porter Galvez / Plan: [employer] HUMANA MEDICARE CHOICE PPO/PFFS / Product Type: Managed Care Medicare /      NAME/AGE/GENDER: Yasmany Lou is a 76 y.o. male   PRIMARY DIAGNOSIS: ACS  Elevated Troponin  A-Fib  NSTEMI (non-ST elevated myocardial infarction) (HCC) Elevated troponin level Elevated troponin level        ICD-10: Treatment Diagnosis:   · Other abnormalities of gait and mobility (R26.89)   Precaution/Allergies:  Levaquin [levofloxacin] and Prednisone      ASSESSMENT:     Mr. Nas Corral presents sitting in recliner, admitted due to NSTEMI. Patient pleasant and agreeable for PT assessment. Patient stood with CG to min assist.  He ambulated 250' with cane and min assist.  HR increased from 75 to 80 and BP increased from 90/62 to 105/64. Patient's dynamic standing balance poor, and he is at increased risk of falls. He experienced LOB while turning head during ambulation. He ambulates with short, shuffling steps. Instructed patient that he needs to use a RW at all times for safety.   Patient has spent prolonged time in acute care and rehab over the past couple months and has declined in functional mobility. He is very motivated to live independently. Mr. Lida Benavides would benefit from skilled physical therapy (medically necessary) to address his deficits and maximize his function. This section established at most recent assessment   PROBLEM LIST (Impairments causing functional limitations):  1. Decreased ADL/Functional Activities  2. Decreased Transfer Abilities  3. Decreased Ambulation Ability/Technique  4. Decreased Balance  5. Decreased Activity Tolerance   INTERVENTIONS PLANNED: (Benefits and precautions of physical therapy have been discussed with the patient.)  1. Balance Exercise  2. Bed Mobility  3. Gait Training  4. Home Exercise Program (HEP)  5. Therapeutic Activites  6. Therapeutic Exercise/Strengthening  7. Transfer Training  8. education  9. Group Therapy     TREATMENT PLAN: Frequency/Duration: 3 times a week for duration of hospital stay  Rehabilitation Potential For Stated Goals: Good     RECOMMENDED REHABILITATION/EQUIPMENT: (at time of discharge pending progress): Due to the probability of continued deficits (see above) this patient will likely need continued skilled physical therapy after discharge. Equipment:    needs RW-says he has one at home              HISTORY:   History of Present Injury/Illness (Reason for Referral):  Per MD note, \"The patient is a 76 year gentleman who was just discharged last Friday after having a BiV pacemaker and AV node ablation. He was at rehab when he developed chest pain last night. He had 2 episodes that were left-sided, radiated to his shoulder, were described as a tightness. There were no aggravating or alleviating factors. They were associated with shortness of breath and diaphoresis. He had a mild episode of chest pain, but since nitroglycerin was placed in the [facility] ER, he has had no further chest pain. \"  Past Medical History/Comorbidities:   Mr. Lida Benavides  has a past medical history of Chronic obstructive pulmonary disease ([facility] [address] Dyspnea (7/21/2016); Gastrointestinal disorder; and Heart failure ([facility] [address] Mr. Nhi Watkins  has a past surgical history that includes hx orthopaedic and hx gi. Social History/Living Environment:   Home Environment: Private residence  # Steps to Enter: 4  Wheelchair Ramp: Yes  One/Two Story Residence: One story  Living Alone: No  Support Systems: Family member(s), Other (comments) (lives with niece and nephew)  Patient Expects to be Discharged to[de-identified] Rehabilitation facility  Current DME Used/Available at Home: [place], straight, Walker, rollator, Walker  Tub or Shower Type: Tub/Shower combination (but only sponge bathes)  Prior Level of Function/Work/Activity:  Patient has been living with niece and her spouse. Ambulates independently with cane. Number of Personal Factors/Comorbidities that affect the Plan of Care: 1-2: MODERATE COMPLEXITY   EXAMINATION:   Most Recent Physical Functioning:   Gross Assessment: B LE's  AROM: Generally decreased, functional  Strength: Generally decreased, functional  Sensation: Impaired               Posture:  Posture (WDL): Exceptions to WDL  Posture Assessment:  Forward head, Rounded shoulders  Balance:  Sitting: Impaired  Sitting - Static: Good (unsupported)  Sitting - Dynamic: Fair (occasional)  Standing: Impaired  Standing - Static: Fair  Standing - Dynamic : Poor Bed Mobility:     Wheelchair Mobility:     Transfers:  Sit to Stand: Contact guard assistance;Minimum assistance  Stand to Sit: Contact guard assistance  Gait:     Base of Support: Widened  Speed/Zuleyka: Slow;Shuffled;Pace decreased (<100 feet/min)  Step Length: Right shortened;Left shortened  Gait Abnormalities: Altered arm swing;Decreased step clearance;Shuffling gait  Distance (ft): 250 Feet (ft)  Assistive Device: Cane, straight;Gait belt  Ambulation - Level of Assistance: Minimal assistance  Interventions: Safety awareness training;Verbal cues      Body Structures Involved:  1. Heart  2. Muscles Body Functions Affected:  1. Cardio  2. Movement Related Activities and Participation Affected:  1. Mobility  2. Self Care  3. Domestic Life  4. Community, Social and [place]   Number of elements that affect the Plan of Care: 4+: HIGH COMPLEXITY   CLINICAL PRESENTATION:   Presentation: Stable and uncomplicated: LOW COMPLEXITY   CLINICAL DECISION MAKIN [facility] Mobility Inpatient Short Form  How much difficulty does the patient currently have. .. Unable A Lot A Little None   1. Turning over in bed (including adjusting bedclothes, sheets and blankets)? [] 1   [] 2   [] 3   [x] 4   2. Sitting down on and standing up from a chair with arms ( e.g., wheelchair, bedside commode, etc.)   [] 1   [] 2   [x] 3   [] 4   3. Moving from lying on back to sitting on the side of the bed? [] 1   [] 2   [] 3   [x] 4   How much help from another person does the patient currently need. .. Total A Lot A Little None   4. Moving to and from a bed to a chair (including a wheelchair)? [] 1   [] 2   [x] 3   [] 4   5. Need to walk in hospital room? [] 1   [] 2   [x] 3   [] 4   6. Climbing 3-5 steps with a railing? [] 1   [] 2   [x] 3   [] 4   © , Trustees of [address] [place] [address], under license to [employer]. All rights reserved      Score:  Initial: 20 Most Recent: X (Date: -- )    Interpretation of Tool:  Represents activities that are increasingly more difficult (i.e. Bed mobility, Transfers, Gait). Score 24 23 22-20 19-15 14-10 9-7 6     Modifier CH CI CJ CK CL CM CN      ?  Mobility - Walking and Moving Around:     - CURRENT STATUS: CJ - 20%-39% impaired, limited or restricted    - GOAL STATUS: CI - 1%-19% impaired, limited or restricted    - D/C STATUS:  ---------------To be determined---------------  Payor: HUMANA MEDICARE / Plan: [facility] HUMANA MEDICARE CHOICE PPO/PFFS / Product Type: Managed Care Medicare /      Medical Necessity: · Patient is expected to demonstrate progress in balance and functional technique to increase independence with   and improve safety during all functional mobility. Reason for Services/Other Comments:  · Patient continues to require skilled intervention due to medical complications. Use of outcome tool(s) and clinical judgement create a POC that gives a: Clear prediction of patient's progress: LOW COMPLEXITY            TREATMENT:   (In addition to Assessment/Re-Assessment sessions the following treatments were rendered)   Pre-treatment Symptoms/Complaints:    Pain: Initial:   Pain Intensity 1: 1  Pain Location 1: Chest  Pain Intervention(s) 1: Ambulation/Increased Activity  Post Session:  Unchanged. Assessment/Reassessment only, no treatment provided today    Braces/Orthotics/Lines/Etc:   · Eagle monitor  · O2 Device: Room air  Treatment/Session Assessment:    · Response to Treatment:  See above. · Interdisciplinary Collaboration:   o Physical Therapist  o Occupational Therapist  o Registered Nurse  o Certified Nursing Assistant/Patient Care Technician  · After treatment position/precautions:   o Up in chair  o Bed/Chair-wheels locked  o Caregiver at bedside  o Call light within reach   · Compliance with Program/Exercises: compliant all of the time. · Recommendations/Intent for next treatment session: \"Next visit will focus on advancements to more challenging activities and reduction in assistance provided\".   Total Treatment Duration:  PT Patient Time In/Time Out  Time In: 1312  Time Out: Destiny Bartlett PT, DPT

## 2018-02-28 NOTE — PROGRESS NOTES
Problem: Nutrition Deficit  Goal: *Optimize nutritional status  Nutrition  Reason for assessment: Referral received from nursing admission Malnutrition Screening Tool for recently lost 2-13# without trying and eating poorly due to decreased appetite. Assessment:   Diet order(s): Cardiac with ensure enlive TID  Food/Nutrition Patient History:  The patient reports a UBW of ~170 pounds. He reports that weight loss has occurred over the past ~4-6 months since he \"got sick. \"  Per patient, he was consuming ~1 meal per day and states that this was often take out meals ([employer], ingles take out meals, etc.)  He reports that he was not able to cook meals, had increased SOB, etc.  He states that he started to drink boost as a meal but he thinks it makes him nauseated. He was recently admitted (discharged Friday to rehab) for AV node ablation and pacemaker placement. He reports that at rehab he did not like some of the foods served. He is hopeful to be going home with home health vs back to rehab. He states that he is planning on using his slow cooker so that he does not have to do much preparation. We talked about cooking hardboiled eggs, using dry beans, frozen meals, vegetables, and high calorie/protein snacks to supplement poor meal intake throughout the day. Anthropometrics:    Vitals 2/20/2018   Height 5' 7\"   ,  Weight: 67.4 kg (148 lb 9.6 oz), Weight Source: Standing scale (comment), Body mass index is 23.27 kg/(m^2). BMI class of normal weight for age. No edema noted.    WT / BMI 2/28/2018 2/23/2018 2/6/2018   WEIGHT 148 lb 9.6 oz 149 lb 6.4 oz 156 lb 9.6 oz     WT / BMI 1/26/2018 12/18/2017 11/14/2017 11/8/2017   WEIGHT 150 lb 12.8 oz 156 lb 156 lb 159 lb     WT / BMI 10/11/2017 9/22/2017 8/17/2017 8/16/2017 8/12/2017   WEIGHT 159 lb 160 lb 159 lb 162 lb 12.8 oz 169 lb     WT / BMI 8/1/2017 7/26/2017 7/11/2017 5/9/2017 2/8/2017   WEIGHT 173 lb 173 lb 171 lb 3.2 oz 179 lb 178 lb   Per weights in EMR, patient with a potential 30 pound, 16.8% clinically insignificant weight loss over ~1 year and a recent 8 pound, 5.1% clinically insignificant weight loss over ~3 months. Macronutrient needs:  EER:  [id number] kcal /day (23-28 kcal/kg listed BW)  EPR:  67-81 grams protein/day (1-1.2 grams/kg IBW)(GFR >60)  Intake/Comparative Standards: No recorded meal intake. Nutrition Diagnosis: Inadequate oral intake r/t decreased ability to consume sufficient oral intake as evidenced by patient report of eating ~1 meal per day for the past 4-6 months and weight loss noted above. Intervention:  Meals and snacks: Continue current diet. Add preferences. Nutrition Supplement Therapy: continue ensure enlive TID   Discharge nutrition plan: Too soon to determine.     [address], [place], [place], [phone]

## 2018-02-28 NOTE — PROGRESS NOTES
Chart check completed. Discussed Coumadin orders with Shelby Valdovinos NP. No new orders received at this time. INR already scheduled for this am.    Chart reviewed by Shelby Valdovinos NP. New orders received for Coumadin 5mg now - which is patients home dose for a Tuesday night.

## 2018-02-28 NOTE — PROGRESS NOTES
Problem: Falls - Risk of  Goal: *Absence of Falls  Document Heather Fall Risk and appropriate interventions in the flowsheet.    Outcome: Progressing Towards Goal  Fall Risk Interventions:  Mobility Interventions: Bed/chair exit alarm, Communicate number of staff needed for ambulation/transfer, Patient to call before getting OOB         Medication Interventions: Bed/chair exit alarm    Elimination Interventions: Patient to call for help with toileting needs

## 2018-02-28 NOTE — PROGRESS NOTES
Problem: Falls - Risk of  Goal: *Absence of Falls  Document Heather Fall Risk and appropriate interventions in the flowsheet.    Outcome: Progressing Towards Goal  Fall Risk Interventions:  Mobility Interventions: Bed/chair exit alarm, Communicate number of staff needed for ambulation/transfer, Patient to call before getting OOB, OT consult for ADLs, PT Consult for mobility concerns         Medication Interventions: Bed/chair exit alarm, Teach patient to arise slowly, Patient to call before getting OOB    Elimination Interventions: Bed/chair exit alarm, Call light in reach, Patient to call for help with toileting needs, Toileting schedule/hourly rounds

## 2018-02-28 NOTE — PROGRESS NOTES
Verbal bedside report given to Westley Silverio and Zhanna Ramirez, oncoming RN. Patient's situation, background, assessment and recommendations provided. Opportunity for questions provided. Oncoming RN assumed care of patient.

## 2018-02-28 NOTE — PROGRESS NOTES
Bedside and Verbal shift change report given to self (oncoming nurse) by Davi Tran RN (offgoing nurse). Report included the following information SBAR, Kardex, MAR and Recent Results.   Right groin and right radial cath sites [facility]

## 2018-02-28 NOTE — PROGRESS NOTES
Bedside and Verbal shift change report given to [facility] heoung RN (oncoming nurse) by self (offgoing nurse). Report included the following information SBAR, Kardex, MAR and Recent Results.

## 2018-02-28 NOTE — PROGRESS NOTES
Problem: Cath Lab Procedures: Post-Cath Day of Procedure (Initiate SCIP Measures for Post-Op Care)  Goal: *Optimal pain control at patient's stated goal  Outcome: Resolved/Met Date Met: 02/28/18  Patient states pain is 0/10.

## 2018-03-01 LAB
ANION GAP SERPL CALC-SCNC: 10 MMOL/L (ref 7–16)
BASOPHILS # BLD: 0.1 K/UL (ref 0–0.2)
BASOPHILS NFR BLD: 1 % (ref 0–2)
BUN SERPL-MCNC: 19 MG/DL (ref 8–23)
CALCIUM SERPL-MCNC: 8.3 MG/DL (ref 8.3–10.4)
CHLORIDE SERPL-SCNC: 109 MMOL/L (ref 98–107)
CO2 SERPL-SCNC: 23 MMOL/L (ref 21–32)
CREAT SERPL-MCNC: 0.99 MG/DL (ref 0.8–1.5)
DIFFERENTIAL METHOD BLD: ABNORMAL
EOSINOPHIL # BLD: 0 K/UL (ref 0–0.8)
EOSINOPHIL NFR BLD: 1 % (ref 0.5–7.8)
ERYTHROCYTE [DISTWIDTH] IN BLOOD BY AUTOMATED COUNT: 13.5 % (ref 11.9–14.6)
GLUCOSE SERPL-MCNC: 98 MG/DL (ref 65–100)
HCT VFR BLD AUTO: 30.9 % (ref 41.1–50.3)
HGB BLD-MCNC: 10.3 G/DL (ref 13.6–17.2)
IMM GRANULOCYTES # BLD: 0 K/UL (ref 0–0.5)
IMM GRANULOCYTES NFR BLD AUTO: 1 % (ref 0–5)
INR PPP: 1.9
LYMPHOCYTES # BLD: 0.5 K/UL (ref 0.5–4.6)
LYMPHOCYTES NFR BLD: 11 % (ref 13–44)
MCH RBC QN AUTO: 28.9 PG (ref 26.1–32.9)
MCHC RBC AUTO-ENTMCNC: 33.3 G/DL (ref 31.4–35)
MCV RBC AUTO: 86.6 FL (ref 79.6–97.8)
MONOCYTES # BLD: 0.6 K/UL (ref 0.1–1.3)
MONOCYTES NFR BLD: 12 % (ref 4–12)
NEUTS SEG # BLD: 3.9 K/UL (ref 1.7–8.2)
NEUTS SEG NFR BLD: 74 % (ref 43–78)
PLATELET # BLD AUTO: 183 K/UL (ref 150–450)
PMV BLD AUTO: 9 FL (ref 10.8–14.1)
POTASSIUM SERPL-SCNC: 4 MMOL/L (ref 3.5–5.1)
PROTHROMBIN TIME: 21.5 SEC (ref 11.5–14.5)
RBC # BLD AUTO: 3.57 M/UL (ref 4.23–5.67)
SODIUM SERPL-SCNC: 142 MMOL/L (ref 136–145)
WBC # BLD AUTO: 5.2 K/UL (ref 4.3–11.1)

## 2018-03-01 PROCEDURE — 74011000250 HC RX REV CODE- 250: Performed by: INTERNAL MEDICINE

## 2018-03-01 PROCEDURE — 36415 COLL VENOUS BLD VENIPUNCTURE: CPT | Performed by: INTERNAL MEDICINE

## 2018-03-01 PROCEDURE — 94640 AIRWAY INHALATION TREATMENT: CPT

## 2018-03-01 PROCEDURE — 94760 N-INVAS EAR/PLS OXIMETRY 1: CPT

## 2018-03-01 PROCEDURE — 74011250637 HC RX REV CODE- 250/637: Performed by: INTERNAL MEDICINE

## 2018-03-01 PROCEDURE — 77010033678 HC OXYGEN DAILY

## 2018-03-01 PROCEDURE — 80048 BASIC METABOLIC PNL TOTAL CA: CPT | Performed by: INTERNAL MEDICINE

## 2018-03-01 PROCEDURE — 85025 COMPLETE CBC W/AUTO DIFF WBC: CPT | Performed by: INTERNAL MEDICINE

## 2018-03-01 PROCEDURE — 74011250636 HC RX REV CODE- 250/636: Performed by: NURSE PRACTITIONER

## 2018-03-01 PROCEDURE — 85610 PROTHROMBIN TIME: CPT | Performed by: INTERNAL MEDICINE

## 2018-03-01 PROCEDURE — 65660000000 HC RM CCU STEPDOWN

## 2018-03-01 RX ORDER — SODIUM CHLORIDE 9 MG/ML
250 INJECTION, SOLUTION INTRAVENOUS ONCE
Status: COMPLETED | OUTPATIENT
Start: 2018-03-01 | End: 2018-03-01

## 2018-03-01 RX ORDER — METOPROLOL TARTRATE 25 MG/1
12.5 TABLET, FILM COATED ORAL 2 TIMES DAILY
Status: DISCONTINUED | OUTPATIENT
Start: 2018-03-01 | End: 2018-03-01

## 2018-03-01 RX ADMIN — ASPIRIN 81 MG: 81 TABLET, COATED ORAL at 08:26

## 2018-03-01 RX ADMIN — ALBUTEROL SULFATE 2.5 MG: 2.5 SOLUTION RESPIRATORY (INHALATION) at 07:18

## 2018-03-01 RX ADMIN — BUDESONIDE 500 MCG: 0.5 INHALANT RESPIRATORY (INHALATION) at 07:18

## 2018-03-01 RX ADMIN — ALBUTEROL SULFATE 2.5 MG: 2.5 SOLUTION RESPIRATORY (INHALATION) at 01:09

## 2018-03-01 RX ADMIN — ALBUTEROL SULFATE 2.5 MG: 2.5 SOLUTION RESPIRATORY (INHALATION) at 14:54

## 2018-03-01 RX ADMIN — Medication 5 ML: at 14:20

## 2018-03-01 RX ADMIN — MONTELUKAST SODIUM 10 MG: 10 TABLET, FILM COATED ORAL at 08:26

## 2018-03-01 RX ADMIN — SODIUM CHLORIDE 250 ML: 900 INJECTION, SOLUTION INTRAVENOUS at 08:26

## 2018-03-01 RX ADMIN — WARFARIN SODIUM 5 MG: 5 TABLET ORAL at 17:27

## 2018-03-01 RX ADMIN — Medication 5 ML: at 20:52

## 2018-03-01 RX ADMIN — ALBUTEROL SULFATE 2.5 MG: 2.5 SOLUTION RESPIRATORY (INHALATION) at 19:25

## 2018-03-01 RX ADMIN — BUDESONIDE 500 MCG: 0.5 INHALANT RESPIRATORY (INHALATION) at 19:25

## 2018-03-01 RX ADMIN — PANTOPRAZOLE SODIUM 40 MG: 40 TABLET, DELAYED RELEASE ORAL at 08:26

## 2018-03-01 RX ADMIN — Medication 10 ML: at 05:19

## 2018-03-01 RX ADMIN — ACETAMINOPHEN 650 MG: 325 TABLET ORAL at 14:20

## 2018-03-01 NOTE — PROGRESS NOTES
Verbal bedside report received from Pieter Stewart, [facility]. Patient's situation, background, assessment and recommendations were provided. Kardex, Mar, and recent results also reviewed. Opportunity for questions provided. Assumed care of patient.

## 2018-03-01 NOTE — PROGRESS NOTES
Bedside and Verbal shift change report given to self (oncoming nurse) by Nadia Hastings RN (offgoing nurse). Report included the following information SBAR, Kardex, MAR and Recent Results.

## 2018-03-01 NOTE — PROGRESS NOTES
Patient complain of SOB. Patient respirations even and unlabored. Lung sounds unchanged. VSS- O2 sat 96% on room air. Patient placed on 2L/O2/NC for comfort. Patient in semi fowlers.

## 2018-03-01 NOTE — PROGRESS NOTES
End of shift note. Patient did well today. No complaints of pain or SOB. O2 weaned down. Now on r/a. Sat up in chair for good part of day today. BPs have normalized.

## 2018-03-01 NOTE — PROGRESS NOTES
Bedside and Verbal shift change report given to self (oncoming nurse) by Jessica Gould RN (offgoing nurse). Report included the following information SBAR, Kardex, MAR and Recent Results.

## 2018-03-01 NOTE — PROGRESS NOTES
Bedside and Verbal shift change report given to Corrine Roberson RN (oncoming nurse) by self Paulette silverman). Report included the following information SBAR, Kardex, MAR and Recent Results. Bed alarm on and functioning.

## 2018-03-01 NOTE — PROGRESS NOTES
Problem: Falls - Risk of  Goal: *Absence of Falls  Document Heather Fall Risk and appropriate interventions in the flowsheet. Outcome: Progressing Towards Goal  Fall Risk Interventions:  Mobility Interventions: Bed/chair exit alarm, Patient to call before getting OOB         Medication Interventions: Bed/chair exit alarm, Evaluate medications/consider consulting pharmacy, Patient to call before getting OOB, Teach patient to arise slowly    Elimination Interventions: Bed/chair exit alarm, Elevated toilet seat, Call light in reach, Toilet paper/wipes in reach, Patient to call for help with toileting needs             Problem: Cath Lab Procedures: Post-Cath Day 1  Goal: Activity/Safety  Outcome: Resolved/Met Date Met: 02/28/18  Right radial and right groin WDL post cath. Awaiting discharge to possible rehab facility. Problem: Pressure Injury - Risk of  Goal: *Prevention of pressure ulcer  Outcome: Progressing Towards Goal  Sacrum WDL. Allevyn pulled back and underlying skin is intact just fragile. Patient changes position in bed on his own.

## 2018-03-01 NOTE — PROGRESS NOTES
Called and spoke with Ivory Flynn at [facility] and rehab that [facility] has approved patient to come to them and bed available when patient medically stable.

## 2018-03-01 NOTE — PROGRESS NOTES
[facility] CARDIOLOGY PROGRESS NOTE           3/1/2018 7:05 AM    Admit Date: 2/26/2018      Subjective:   Resting in bed. C/P dizziness with hypotension this am.     ROS:  Cardiovascular:  As noted above    Objective:      Vitals:    02/28/18 2106 03/01/18 0039 03/01/18 0109 03/01/18 0441   BP: 127/52 135/80  99/60   Pulse: 78 75  76   Resp: 17 17  17   Temp: 98.9 °F (37.2 °C) 98.9 °F (37.2 °C)  99.6 °F (37.6 °C)   SpO2: 99% 96% 97% 94%   Weight:    67.4 kg (148 lb 8 oz)       Physical Exam:  General-No Acute Distress  Neck- supple, no JVD  CV- regular rate and rhythm no MRG  Lung- clear bilaterally  Abd- soft, nontender, nondistended  Ext- no edema bilaterally. Skin- warm and dry    Data Review:   Recent Labs      03/01/18   0405  02/28/18   1717  02/28/18   0427  02/27/18   0833  02/27/18   0432  02/27/18   0030   NA  142   --   140   --   140   --    K  4.0   --   3.9   --   3.9   --    BUN  19   --   23   --   27*   --    CREA  0.99   --   1.09   --   0.90   --    GLU  98   --   109*   --   88   --    WBC  5.2   --   5.7   --   5.3   --    HGB  10.3*   --   10.7*   --   10.7*   --    HCT  30.9*   --   32.4*   --   32.2*   --    PLT  183   --   212   --   218   --    INR  1.9  1.7  1.8   --   1.8   --    TROIQ   --    --    --   1.44*   --   1.67*   CHOL   --    --    --    --   151   --    LDLC   --    --    --    --   65.8   --    HDL   --    --    --    --   76*   --        Assessment/Plan:     Principal Problem:    Elevated troponin level (2/26/2018)    Likely secondary to recent ablation. LHC showed mild nonobstructive coronary artery disease, with preserved left ventricular systolic function. Continue medical therapy. Active Problems:    Chest pain (90/71/6255)    Uncertain etiology. ? GI and will treat with PPI      Permanent atrial fibrillation ([facility] [address] (10/20/2015)    S/P AV node ablation and resume coumadin. INR today 1.9.        HTN (hypertension) (10/20/2015)    Actually mild hypotension this am and dizziness; will stop metoprolol and give 250 ml bolus     D/C planning- waiting for precert for STR. Jonas Pham NP  3/1/2018 7:05 AM  ATTENDING ADDENDUM:    Patient seen and examined by me. Agree with above note by physician extender. Key findings are:  No CP or SEBASTIAN but weak and fatigued with marginal BP on low dose BB's (intolerant to 25mg in past, and now s/p AVN ablation and systolics 06'O today- STOP beta blockers, gently hydrate this AM with EF low normal recently, to rehab when bed available). Check INR and basic labs in AM.   CV- RRR without murmur  Lungs- Clear bilaterally  Abd- soft, nontender, nondistended  Ext- no edema    Plan: As above.     Ralph Loving MD  [facility] Cardiology  Pager [phone]

## 2018-03-01 NOTE — PROGRESS NOTES
Bedside and Verbal shift change report given to self (oncoming nurse) by Lizett Kendrick RN (offgoing nurse). Report included the following information SBAR, Kardex, MAR and Recent Results.

## 2018-03-02 ENCOUNTER — APPOINTMENT (OUTPATIENT)
Dept: GENERAL RADIOLOGY | Age: 78
DRG: 287 | End: 2018-03-02
Attending: PHYSICIAN ASSISTANT
Payer: MEDICARE

## 2018-03-02 VITALS
HEART RATE: 77 BPM | WEIGHT: 146.9 LBS | TEMPERATURE: 98.7 F | OXYGEN SATURATION: 94 % | SYSTOLIC BLOOD PRESSURE: 102 MMHG | RESPIRATION RATE: 18 BRPM | DIASTOLIC BLOOD PRESSURE: 62 MMHG | BODY MASS INDEX: 23.01 KG/M2

## 2018-03-02 LAB
ANION GAP SERPL CALC-SCNC: 8 MMOL/L (ref 7–16)
BASOPHILS # BLD: 0.1 K/UL (ref 0–0.2)
BASOPHILS NFR BLD: 1 % (ref 0–2)
BUN SERPL-MCNC: 17 MG/DL (ref 8–23)
CALCIUM SERPL-MCNC: 7.9 MG/DL (ref 8.3–10.4)
CHLORIDE SERPL-SCNC: 111 MMOL/L (ref 98–107)
CO2 SERPL-SCNC: 22 MMOL/L (ref 21–32)
CREAT SERPL-MCNC: 1.07 MG/DL (ref 0.8–1.5)
DIFFERENTIAL METHOD BLD: ABNORMAL
EOSINOPHIL # BLD: 0 K/UL (ref 0–0.8)
EOSINOPHIL NFR BLD: 0 % (ref 0.5–7.8)
ERYTHROCYTE [DISTWIDTH] IN BLOOD BY AUTOMATED COUNT: 13.8 % (ref 11.9–14.6)
GLUCOSE SERPL-MCNC: 103 MG/DL (ref 65–100)
HCT VFR BLD AUTO: 30.4 % (ref 41.1–50.3)
HGB BLD-MCNC: 10 G/DL (ref 13.6–17.2)
IMM GRANULOCYTES # BLD: 0 K/UL (ref 0–0.5)
IMM GRANULOCYTES NFR BLD AUTO: 1 % (ref 0–5)
INR PPP: 2
LYMPHOCYTES # BLD: 0.6 K/UL (ref 0.5–4.6)
LYMPHOCYTES NFR BLD: 13 % (ref 13–44)
MAGNESIUM SERPL-MCNC: 1.7 MG/DL (ref 1.8–2.4)
MCH RBC QN AUTO: 29 PG (ref 26.1–32.9)
MCHC RBC AUTO-ENTMCNC: 32.9 G/DL (ref 31.4–35)
MCV RBC AUTO: 88.1 FL (ref 79.6–97.8)
MONOCYTES # BLD: 0.6 K/UL (ref 0.1–1.3)
MONOCYTES NFR BLD: 12 % (ref 4–12)
NEUTS SEG # BLD: 3.7 K/UL (ref 1.7–8.2)
NEUTS SEG NFR BLD: 73 % (ref 43–78)
PLATELET # BLD AUTO: 179 K/UL (ref 150–450)
PMV BLD AUTO: 9.1 FL (ref 10.8–14.1)
POTASSIUM SERPL-SCNC: 4.3 MMOL/L (ref 3.5–5.1)
PROTHROMBIN TIME: 22.1 SEC (ref 11.5–14.5)
RBC # BLD AUTO: 3.45 M/UL (ref 4.23–5.67)
SODIUM SERPL-SCNC: 141 MMOL/L (ref 136–145)
WBC # BLD AUTO: 5 K/UL (ref 4.3–11.1)

## 2018-03-02 PROCEDURE — 85610 PROTHROMBIN TIME: CPT | Performed by: INTERNAL MEDICINE

## 2018-03-02 PROCEDURE — 74011000250 HC RX REV CODE- 250: Performed by: INTERNAL MEDICINE

## 2018-03-02 PROCEDURE — 74011250636 HC RX REV CODE- 250/636: Performed by: INTERNAL MEDICINE

## 2018-03-02 PROCEDURE — 74011250637 HC RX REV CODE- 250/637: Performed by: PHYSICIAN ASSISTANT

## 2018-03-02 PROCEDURE — 36415 COLL VENOUS BLD VENIPUNCTURE: CPT | Performed by: INTERNAL MEDICINE

## 2018-03-02 PROCEDURE — 80048 BASIC METABOLIC PNL TOTAL CA: CPT | Performed by: INTERNAL MEDICINE

## 2018-03-02 PROCEDURE — 83735 ASSAY OF MAGNESIUM: CPT | Performed by: INTERNAL MEDICINE

## 2018-03-02 PROCEDURE — 94760 N-INVAS EAR/PLS OXIMETRY 1: CPT

## 2018-03-02 PROCEDURE — 85025 COMPLETE CBC W/AUTO DIFF WBC: CPT | Performed by: INTERNAL MEDICINE

## 2018-03-02 PROCEDURE — 74011250637 HC RX REV CODE- 250/637: Performed by: INTERNAL MEDICINE

## 2018-03-02 PROCEDURE — 71046 X-RAY EXAM CHEST 2 VIEWS: CPT

## 2018-03-02 PROCEDURE — 94640 AIRWAY INHALATION TREATMENT: CPT

## 2018-03-02 RX ORDER — WARFARIN SODIUM 5 MG/1
5 TABLET ORAL
Qty: 60 TAB | Refills: 1 | Status: SHIPPED
Start: 2018-03-02 | End: 2018-04-24 | Stop reason: SDUPTHER

## 2018-03-02 RX ORDER — LANOLIN ALCOHOL/MO/W.PET/CERES
400 CREAM (GRAM) TOPICAL 2 TIMES DAILY
Status: DISCONTINUED | OUTPATIENT
Start: 2018-03-02 | End: 2018-03-02 | Stop reason: HOSPADM

## 2018-03-02 RX ORDER — MAGNESIUM SULFATE HEPTAHYDRATE 40 MG/ML
2 INJECTION, SOLUTION INTRAVENOUS ONCE
Status: COMPLETED | OUTPATIENT
Start: 2018-03-02 | End: 2018-03-02

## 2018-03-02 RX ORDER — LANOLIN ALCOHOL/MO/W.PET/CERES
400 CREAM (GRAM) TOPICAL 2 TIMES DAILY
Qty: 60 TAB | Refills: 11 | Status: SHIPPED
Start: 2018-03-02 | End: 2018-08-14

## 2018-03-02 RX ADMIN — PANTOPRAZOLE SODIUM 40 MG: 40 TABLET, DELAYED RELEASE ORAL at 07:38

## 2018-03-02 RX ADMIN — ALBUTEROL SULFATE 2.5 MG: 2.5 SOLUTION RESPIRATORY (INHALATION) at 02:35

## 2018-03-02 RX ADMIN — ALBUTEROL SULFATE 2.5 MG: 2.5 SOLUTION RESPIRATORY (INHALATION) at 07:26

## 2018-03-02 RX ADMIN — Medication 400 MG: at 09:51

## 2018-03-02 RX ADMIN — ACETAMINOPHEN 650 MG: 325 TABLET ORAL at 00:14

## 2018-03-02 RX ADMIN — MAGNESIUM SULFATE HEPTAHYDRATE 2 G: 40 INJECTION, SOLUTION INTRAVENOUS at 09:51

## 2018-03-02 RX ADMIN — MONTELUKAST SODIUM 10 MG: 10 TABLET, FILM COATED ORAL at 07:38

## 2018-03-02 RX ADMIN — Medication 5 ML: at 05:03

## 2018-03-02 RX ADMIN — BUDESONIDE 500 MCG: 0.5 INHALANT RESPIRATORY (INHALATION) at 07:26

## 2018-03-02 NOTE — PROGRESS NOTES
Problem: Falls - Risk of  Goal: *Absence of Falls  Document Heather Fall Risk and appropriate interventions in the flowsheet.    Outcome: Progressing Towards Goal  Fall Risk Interventions:  Mobility Interventions: Bed/chair exit alarm, Communicate number of staff needed for ambulation/transfer, Patient to call before getting OOB, PT Consult for mobility concerns, PT Consult for assist device competence, OT consult for ADLs         Medication Interventions: Bed/chair exit alarm, Evaluate medications/consider consulting pharmacy, Patient to call before getting OOB, Teach patient to arise slowly    Elimination Interventions: Bed/chair exit alarm, Call light in reach, Elevated toilet seat, Toilet paper/wipes in reach, Urinal in reach, Toileting schedule/hourly rounds

## 2018-03-02 NOTE — PROGRESS NOTES
Written shift change report given to Joslyn Gonzalez RN (oncoming nurse) by self Gail Farooqost nurse). Report included the following information SBAR, Kardex, MAR and Recent Results.

## 2018-03-02 NOTE — DISCHARGE SUMMARY
[facility] Cardiology Discharge Summary     Patient ID:  Kirill Wheatley  [id number]  37 y.o.  1940    Admit date: 2/26/2018    Discharge date:  3/2/2018    Admitting Physician: Usha Matias MD     Discharge Physician: DELMY Garza/Dr. Loomis Miss    Admission Diagnoses: ACS  Elevated Troponin  A-Fib  NSTEMI (non-ST elevated myocardial infarction) [facility])    Discharge Diagnoses:    Diagnosis    Elevated troponin level    Atrial fibrillation (HCC)    Chronic diastolic heart failure ([address]    A-fib ([facility] [address]    Cardiomyopathy ([facility] [address]    Abdominal aortic aneurysm without rupture (HCC)    Dyspnea    Chronic obstructive pulmonary disease (HCC)    Shortness of breath    Headache    Chest pain    Chronic renal failure    COPD (chronic obstructive pulmonary disease) (HCC)    Permanent atrial fibrillation (HCC)    TIA (transient ischemic attack)    Mitral valve regurgitation    HTN (hypertension)       Cardiology Procedures this admission:  Diagnostic left heart catheterization, echo  Consults: None    Hospital Course: Patient transferred from [facility] ER to [facility] with complaints of chest pain radiating to his L shoulder which was a tightness with SOB and diaphoresis and resolved with nitro. Patient was evaluated and subsequently admitted for further cardiac evaluation and treatment. INR low at 1.6 and coumadin held, heparin started. Cardiac enzymes were elevated at 1.83. [facility] was planned for 2-27-18. Patient underwent cardiac catheterization by Dr. Lincoln LERN[facility] which showed mild nonobstructive disease with nml EF. Patient tolerated the procedure well and returned to the telemetry floor for recovery. Elevated troponin thought to be secondary to recent ablation. An echocardiogram was performed with report as follows:     -  Left ventricle: Systolic function was at the lower limits of normal.  Ejection fraction was estimated to be 50 %.  Although no diagnostic regional  wall motion abnormality was identified, this possibility cannot be completely  excluded on the basis of this study. -  Left atrium: The atrium was markedly dilated. -  Right atrium: The atrium was mildly to moderately dilated. -  Mitral valve: There was moderate regurgitation. -  Tricuspid valve: There was mild regurgitation. He was started back on coumadin. Pt was seen by social work and insurance approval to return to rehab obtained. BP mildly low and BB held, given gentle hydration. CXR 3-2 showed    There is been no change. Heart is enlarged. Lung fields are clear and a  pacemaker is noted.     IMPRESSION  IMPRESSION: As above, no change. The morning of 3/2/2018 patient was up feeling well without any complaints of chest pain or shortness of breath, hypotension improved and insurance approval received to return to rehab. Patient's right radial and femoral cath sites were clean, dry and intact without hematoma or bruit. Patient's labs were WNL w mild drop in hgb to 10 without acute bleeding, INR 2, mag 1.7 and replaced prior to discharge. Patient was seen and examined by Dr. Keith Councilman and determined stable and ready for discharge. The patient will follow up with [facility] Cardiology Dr. Jordan Cool 3-6-18 at [facility] office and has been referred to cardiac rehab. Check CBC and BMP, mag in one week, rehab to draw PT/INR every three days and call to [facility] office. Pt's coumadin dose will be increased as he was subtherapeutic on admission. DISPOSITION: The patient is being discharged home in stable condition on a low saturated fat, low cholesterol and low salt diet. The patient is instructed to advance activities as tolerated to the limit of fatigue or shortness of breath. The patient is instructed to avoid all heavy lifting, straining, stooping or squatting for 3-5 days.  The patient is instructed to watch the cath site for bleeding/oozing; if seen, the patient is instructed to apply firm pressure with a clean cloth and call [facility] Cardiology at [phone]. The patient is instructed to watch for signs of infection which include: increasing area of redness, fever/hot to touch or purulent drainage at the catheterization site. The patient is instructed not to soak in a bathtub for 7-10 days, but is cleared to shower. The patient is instructed to call the office or return to the ER for immediate evaluation for any shortness of breath or chest pain not relieved by NTG. Discharge Exam:   Visit Vitals    /70 (BP 1 Location: Left arm, BP Patient Position: At rest)    Pulse 77    Temp 98.1 °F (36.7 °C)    Resp 18    Wt 66.6 kg (146 lb 14.4 oz)    SpO2 96%    BMI 23.01 kg/m2     Patient has been seen by Dr. Gabriela Kim: see his progress note for exam details.     Recent Results (from the past 24 hour(s))   PROTHROMBIN TIME + INR    Collection Time: 03/02/18  3:43 AM   Result Value Ref Range    Prothrombin time 22.1 (H) 11.5 - 14.5 sec    INR 2.0     METABOLIC PANEL, BASIC    Collection Time: 03/02/18  3:43 AM   Result Value Ref Range    Sodium 141 136 - 145 mmol/L    Potassium 4.3 3.5 - 5.1 mmol/L    Chloride 111 (H) 98 - 107 mmol/L    CO2 22 21 - 32 mmol/L    Anion gap 8 7 - 16 mmol/L    Glucose 103 (H) 65 - 100 mg/dL    BUN 17 8 - 23 MG/DL    Creatinine 1.07 0.8 - 1.5 MG/DL    GFR est AA >60 >60 ml/min/1.73m2    GFR est non-AA >60 >60 ml/min/1.73m2    Calcium 7.9 (L) 8.3 - 10.4 MG/DL   CBC WITH AUTOMATED DIFF    Collection Time: 03/02/18  3:43 AM   Result Value Ref Range    WBC 5.0 4.3 - 11.1 K/uL    RBC 3.45 (L) 4.23 - 5.67 M/uL    HGB 10.0 (L) 13.6 - 17.2 g/dL    HCT 30.4 (L) 41.1 - 50.3 %    MCV 88.1 79.6 - 97.8 FL    MCH 29.0 26.1 - 32.9 PG    MCHC 32.9 31.4 - 35.0 g/dL    RDW 13.8 11.9 - 14.6 %    PLATELET 252 650 - 157 K/uL    MPV 9.1 (L) 10.8 - 14.1 FL    DF AUTOMATED      NEUTROPHILS 73 43 - 78 %    LYMPHOCYTES 13 13 - 44 %    MONOCYTES 12 4.0 - 12.0 %    EOSINOPHILS 0 (L) 0.5 - 7.8 %    BASOPHILS 1 0.0 - 2.0 %    IMMATURE GRANULOCYTES 1 0.0 - 5.0 %    ABS. NEUTROPHILS 3.7 1.7 - 8.2 K/UL    ABS. LYMPHOCYTES 0.6 0.5 - 4.6 K/UL    ABS. MONOCYTES 0.6 0.1 - 1.3 K/UL    ABS. EOSINOPHILS 0.0 0.0 - 0.8 K/UL    ABS. BASOPHILS 0.1 0.0 - 0.2 K/UL    ABS. IMM. GRANS. 0.0 0.0 - 0.5 K/UL   MAGNESIUM    Collection Time: 03/02/18  3:43 AM   Result Value Ref Range    Magnesium 1.7 (L) 1.8 - 2.4 mg/dL         Patient Instructions:   Current Discharge Medication List      START taking these medications    Details   magnesium oxide (MAG-OX) 400 mg tablet Take 1 Tab by mouth two (2) times a day. Qty: 60 Tab, Refills: 6         CONTINUE these medications which have CHANGED    Details   warfarin (COUMADIN) 5 mg tablet Take 1 Tab by mouth nightly. Qty: 60 Tab, Refills: 1         CONTINUE these medications which have NOT CHANGED    Details   omeprazole (PRILOSEC) 20 mg capsule Take 20 mg by mouth daily. senna-docusate (SENNA PLUS) 8.6-50 mg per tablet Take 1 Tab by mouth as needed. fluticasone-vilanterol (BREO ELLIPTA) 100-25 mcg/dose inhaler Take 1 Puff by inhalation daily. montelukast (SINGULAIR) 10 mg tablet Take 10 mg by mouth daily. nitroglycerin (NITROSTAT) 0.4 mg SL tablet 1 Tab by SubLINGual route every five (5) minutes as needed for Chest Pain. Qty: 1 Bottle, Refills: 2      albuterol (PROAIR HFA) 90 mcg/actuation inhaler Take  by inhalation. furosemide (LASIX) 40 mg tablet Take 1 tablet daily or as directed  Qty: 135 Tab, Refills: 3    Associated Diagnoses: Edema, unspecified type      guaiFENesin SR (MUCINEX) 600 mg SR tablet Take 400 mg by mouth as needed. albuterol (ACCUNEB) 1.25 mg/3 mL nebu 1.25 mg by Nebulization route every six (6) hours as needed. acetaminophen (TYLENOL) 500 mg tablet Take  by mouth every six (6) hours as needed for Pain.          STOP taking these medications       metoprolol tartrate (LOPRESSOR) 25 mg tablet Comments:   Reason for Stopping:                 Signed:  Nara Rocha Priscila Hills PA-C  3/2/2018  7:43 AM

## 2018-03-02 NOTE — PROGRESS NOTES
Verbal bedside report received from [address] [place] [address]. Patient's situation, background, assessment and recommendations were provided. Kardex, Mar, and recent results also reviewed. Opportunity for questions provided. Assumed care of patient.

## 2018-03-02 NOTE — PROGRESS NOTES
[facility] CARDIOLOGY PROGRESS NOTE    3/2/2018 7:48 AM    Admit Date: 2/26/2018    Admit Diagnosis: ACS;Elevated Troponin;A-Fib;NSTEMI (non-ST elevated myocard*      Subjective:   Stable overnight without angina, CHF, or palpitations. Vitals stable and controlled. No other complaints overnight. Tolerating meds well. Objective:      Vitals:    03/02/18 0107 03/02/18 0235 03/02/18 0550 03/02/18 0727   BP: 96/61  114/70    Pulse: 75  77    Resp: 16  18    Temp: 99.3 °F (37.4 °C)  98.1 °F (36.7 °C)    SpO2: 99% 98% 99% 96%   Weight:   66.6 kg (146 lb 14.4 oz)        Physical Exam:  Neck- supple, no JVD  CV- regular rate and rhythm no MRG  Lung- clear bilaterally apically, coarse right base  Abd- soft, nontender, nondistended  Ext- no edema  Skin- warm and dry    Data Review:   Recent Labs      03/02/18   0343  03/01/18   0405   NA  141  142   K  4.3  4.0   MG  1.7*   --    BUN  17  19   CREA  1.07  0.99   GLU  103*  98   WBC  5.0  5.2   HGB  10.0*  10.3*   HCT  30.4*  30.9*   PLT  179  183   INR  2.0  1.9       Assessment and Plan:     Principal Problem:    Elevated troponin level (2/26/2018)    Likely secondary to recent ablation. LHC showed mild nonobstructive coronary artery disease, with preserved left ventricular systolic function.  Continue medical therapy.     Active Problems:    Chest pain (31/36/0287)    Uncertain etiology.  ? GI and will treat with PPI       Permanent atrial fibrillation ([facility] [address] (10/20/2015)    S/P AV node ablation and resume coumadin. INR today 2.0.        HTN (hypertension) (10/20/2015)    Actually mild hypotension and dizziness yesterday, stopped BB and hydrated gently, feeling better today, continue to monitor clinically off BB's for now. Cough- coarse right base, check CXR today.      D/C planning- waiting for precert for STR. Replete Mg today.      Lara Buck MD  [address] Cardiology  Pager [phone]

## 2018-03-02 NOTE — PROGRESS NOTES
Care Management Interventions  PCP Verified by CM: Yes  Mode of Transport at Discharge: [facility]  Transition of Care Consult (CM Consult): SNF  Partner SNF: No  Reason Why Partner SNF Not Chosen: Positive previous encounter  Physical Therapy Consult: Yes  Occupational Therapy Consult: Yes  Current Support Network: Other (lives with niece and nephew)  Confirm Follow Up Transport: Family  Plan discussed with Pt/Family/Caregiver: Yes  Freedom of Choice Offered: Yes  Discharge Location  Discharge Placement: Skilled nursing facility  Patient to be d/c today pending CXR review. Notified [facility] and rehab of pending d/cand report call to [phone] room 309A. Notified patient of d/c plan and he is in agreement. Patient to be transported after magnesium IV given and physician reviews CXR. Transport set up with [facility] for 1 pm to [facility] and [address] [place] [address].

## 2018-03-02 NOTE — DISCHARGE INSTRUCTIONS
Cardiac Catheterization/Angiography Discharge Instructions    *Check the puncture site frequently for swelling or bleeding. If you see any bleeding, lie down and apply pressure over the area with a clean town or washcloth. Notify your doctor for any redness, swelling, drainage or oozing from the puncture site. Notify your doctor for any fever or chills. *If the leg or arm with the puncture becomes cold, numb or painful, call [address] Cardiology at [phone]. *Activity should be limited for the next 48 hours. Climb stairs as little as possible and avoid any stooping, bending or strenuous activity for 48 hours. No heavy lifting (anything over 10 pounds) for three days. *Do not drive for 48 hours. *You may resume your usual diet. Drink more fluids than usual.    *Have a responsible person drive you home and stay with you for at least 24 hours after your heart catheterization/angiography. *You may remove the bandage from your Right, Groin and Arm in 24 hours. You may shower in 24 hours. No tub baths, hot tubs or swimming for one week. Do not place any lotions, creams, powders, ointments over the puncture site for one week. You may place a clean band-aid over the puncture site each day for 5 days. Change this daily. Angina: Care Instructions  Your Care Instructions    You have a problem called angina. Angina happens when there is not enough blood flow to your heart muscle. Angina is a sign of coronary artery disease (CAD). CAD occurs when blood vessels that supply the heart become narrowed. Having CAD increases your risk of a heart attack. Chest pain or pressure is the most common symptom of angina. But some people have other symptoms, like:  · Pain, pressure, or a strange feeling in the back, neck, jaw, or upper belly, or in one or both shoulders or arms. · Shortness of breath. · Nausea or vomiting. · Lightheadedness or sudden weakness. · Fast or irregular heartbeat.   Women are somewhat more likely than men to have angina symptoms like shortness of breath, nausea, and back or jaw pain. Angina can be dangerous. That's why it is important to pay attention to your symptoms. Know what is typical for you, learn how to control your symptoms, and understand when you need to get treatment. A change in your usual pattern of symptoms is an emergency. It may mean that you are having a heart attack. The doctor has checked you carefully, but problems can develop later. If you notice any problems or new symptoms, get medical treatment right away. Follow-up care is a key part of your treatment and safety. Be sure to make and go to all appointments, and call your doctor if you are having problems. It's also a good idea to know your test results and keep a list of the medicines you take. How can you care for yourself at home? Medicines  ? · If your doctor has given you nitroglycerin for angina symptoms, keep it with you at all times. If you have symptoms, sit down and rest, and take the first dose of nitroglycerin as directed. If your symptoms get worse or are not getting better within 5 minutes, call 911 right away. Stay on the phone. The emergency  will give you further instructions. ? · If your doctor advises it, take 1 low-dose aspirin a day to prevent heart attack. ? · Be safe with medicines. Take your medicines exactly as prescribed. Call your doctor if you think you are having a problem with your medicine. You will get more details on the specific medicines your doctor prescribes. ? Lifestyle changes  ? · Do not smoke. If you need help quitting, talk to your doctor about stop-smoking programs and medicines. These can increase your chances of quitting for good. ? · Eat a heart-healthy diet that is low in saturated fat and salt, and is high in fiber. Talk to your doctor or a dietitian about healthy eating. ? · Stay at a healthy weight. Or lose weight if you need to. Activity  ? · Talk to your doctor about a level of activity that is safe for you. ? · If an activity causes angina symptoms, stop and rest.   When should you call for help? Call 911 anytime you think you may need emergency care. For example, call if:  ? · You passed out (lost consciousness). ? · You have symptoms of a heart attack. These may include:  ¨ Chest pain or pressure, or a strange feeling in the chest.  ¨ Sweating. ¨ Shortness of breath. ¨ Nausea or vomiting. ¨ Pain, pressure, or a strange feeling in the back, neck, jaw, or upper belly or in one or both shoulders or arms. ¨ Lightheadedness or sudden weakness. ¨ A fast or irregular heartbeat. After you call 911, the  may tell you to chew 1 adult-strength or 2 to 4 low-dose aspirin. Wait for an ambulance. Do not try to drive yourself. ? · You have angina symptoms that do not go away with rest or are not getting better within 5 minutes after you take a dose of nitroglycerin. ?Call your doctor now or seek immediate medical care if:  ? · You are having angina symptoms more often than usual, or they are different or worse than usual.   ? · You feel dizzy or lightheaded, or you feel like you may faint. ? Watch closely for changes in your health, and be sure to contact your doctor if you have any problems. Where can you learn more? Go to http://mary-les.info/. Enter H129 in the search box to learn more about \"Angina: Care Instructions. \"  Current as of: September 21, 2016  Content Version: 11.4  © 6700-0202 [employer]. Care instructions adapted under license by [employer] (which disclaims liability or warranty for this information). If you have questions about a medical condition or this instruction, always ask your healthcare professional. [address] any warranty or liability for your use of this information.        DASH Diet: Care Instructions  Your Care Instructions    The DASH diet is an eating plan that can help lower your blood pressure. DASH stands for Dietary Approaches to Stop Hypertension. Hypertension is high blood pressure. The DASH diet focuses on eating foods that are high in calcium, potassium, and magnesium. These nutrients can lower blood pressure. The foods that are highest in these nutrients are fruits, vegetables, low-fat dairy products, nuts, seeds, and legumes. But taking calcium, potassium, and magnesium supplements instead of eating foods that are high in those nutrients does not have the same effect. The DASH diet also includes whole grains, fish, and poultry. The DASH diet is one of several lifestyle changes your doctor may recommend to lower your high blood pressure. Your doctor may also want you to decrease the amount of sodium in your diet. Lowering sodium while following the DASH diet can lower blood pressure even further than just the DASH diet alone. Follow-up care is a key part of your treatment and safety. Be sure to make and go to all appointments, and call your doctor if you are having problems. It's also a good idea to know your test results and keep a list of the medicines you take. How can you care for yourself at home? Following the DASH diet  · Eat 4 to 5 servings of fruit each day. A serving is 1 medium-sized piece of fruit, ½ cup chopped or canned fruit, 1/4 cup dried fruit, or 4 ounces (½ cup) of fruit juice. Choose fruit more often than fruit juice. · Eat 4 to 5 servings of vegetables each day. A serving is 1 cup of lettuce or raw leafy vegetables, ½ cup of chopped or cooked vegetables, or 4 ounces (½ cup) of vegetable juice. Choose vegetables more often than vegetable juice. · Get 2 to 3 servings of low-fat and fat-free dairy each day. A serving is 8 ounces of milk, 1 cup of yogurt, or 1 ½ ounces of cheese. · Eat 6 to 8 servings of grains each day.  A serving is 1 slice of bread, 1 ounce of dry cereal, or ½ cup of cooked rice, pasta, or cooked cereal. Try to choose whole-grain products as much as possible. · Limit lean meat, poultry, and fish to 2 servings each day. A serving is 3 ounces, about the size of a deck of cards. · Eat 4 to 5 servings of nuts, seeds, and legumes (cooked dried beans, lentils, and split peas) each week. A serving is 1/3 cup of nuts, 2 tablespoons of seeds, or ½ cup of cooked beans or peas. · Limit fats and oils to 2 to 3 servings each day. A serving is 1 teaspoon of vegetable oil or 2 tablespoons of salad dressing. · Limit sweets and added sugars to 5 servings or less a week. A serving is 1 tablespoon jelly or jam, ½ cup sorbet, or 1 cup of lemonade. · Eat less than 2,300 milligrams (mg) of sodium a day. If you limit your sodium to 1,500 mg a day, you can lower your blood pressure even more. Tips for success  · Start small. Do not try to make dramatic changes to your diet all at once. You might feel that you are missing out on your favorite foods and then be more likely to not follow the plan. Make small changes, and stick with them. Once those changes become habit, add a few more changes. · Try some of the following:  ¨ Make it a goal to eat a fruit or vegetable at every meal and at snacks. This will make it easy to get the recommended amount of fruits and vegetables each day. ¨ Try yogurt topped with fruit and nuts for a snack or healthy dessert. ¨ Add lettuce, tomato, cucumber, and onion to sandwiches. ¨ Combine a ready-made pizza crust with low-fat mozzarella cheese and lots of vegetable toppings. Try using tomatoes, squash, spinach, broccoli, carrots, cauliflower, and onions. ¨ Have a variety of cut-up vegetables with a low-fat dip as an appetizer instead of chips and dip. ¨ Sprinkle sunflower seeds or chopped almonds over salads. Or try adding chopped walnuts or almonds to cooked vegetables. ¨ Try some vegetarian meals using beans and peas. Add garbanzo or kidney beans to salads. Make burritos and tacos with mashed vuong beans or black beans. Where can you learn more? Go to http://mary-les.info/. Enter X158 in the search box to learn more about \"DASH Diet: Care Instructions. \"  Current as of: September 21, 2016  Content Version: 11.4  © 2350-6928 [employer]. Care instructions adapted under license by [employer] (which disclaims liability or warranty for this information). If you have questions about a medical condition or this instruction, always ask your healthcare professional. [place] [address] any warranty or liability for your use of this information.

## 2018-03-02 NOTE — PROGRESS NOTES
Bedside and Verbal shift change report given to self (oncoming nurse) by Alex Parry RN (offgoing nurse). Report included the following information SBAR, Kardex, MAR and Recent Results. Bed alarm on and functioning.

## 2018-03-02 NOTE — PROGRESS NOTES
Verbal bedside report given to KP, oncoming RN. Patient's situation, background, assessment and recommendations provided. Kardex, Mar, and recent results also reviewed. Opportunity for questions provided. Oncoming RN assumed care of patient.

## 2018-03-05 ENCOUNTER — PATIENT OUTREACH (OUTPATIENT)
Dept: CASE MANAGEMENT | Age: 78
End: 2018-03-05

## 2018-03-05 NOTE — PROGRESS NOTES
Per [facility] patient discharged to [facility] 03/02/2018, Care Coordinator will schedule follow up call in 21 days post acute discharge to home. This note will not be viewable in [address]

## 2018-03-28 ENCOUNTER — PATIENT OUTREACH (OUTPATIENT)
Dept: CASE MANAGEMENT | Age: 78
End: 2018-03-28

## 2018-03-28 NOTE — PROGRESS NOTES
Skilled Nursing Facility to Home Transitions of Care Discharge Follow-up Questionnaire   Date/Time of Call:   March 28, 2018  3:05PM   What was the patient hospitalized for? Patient hospitalized for Elevated troponin level. Does the patient understand his/her diagnosis and/or treatment and what happened during the hospitalization? Patient states understanding of diagnosis and treatment during hospitalization. Did the patient receive discharge instructions? Yes, prior to discharge to [facility]. Review any discharge instructions (see notes in [facility]). Ask patient if they understand these. Do they have any questions? Patient discharged to [facility] 03/21/2018. Were home services ordered (nursing, PT, OT, ST, etc.)? No       If so, has the first visit occurred? If not, why? (Assist with coordination of services if necessary. ) NA         Was any DME ordered? No durable medical equipment ordered. If so, has it been received? If not, why?  (Assist with coordination of arranging DME orders if necessary. ) NA         Complete a review of all medications (new, continued and discontinued meds per the D/C instructions and medication tab in [facility]). Yes    Start date-03/26/2018  furosemide (LASIX) 40 mg tablet-Take 1 tablet daily or as directed. Were all new prescriptions filled? If not, why?  (Assist with obtainment of medications if necessary.) Yes         Does the patient understand the purpose and dosing instructions for all medications? (If patient has questions, provide explanation and education.) Patient states understanding of current medications and dosing instructions. Care Coordinator educated patient on the importance of medication compliance and reporting medication side effects to PCP/Specialist     Does the patient have any problems in performing ADLs? (If patient is unable to perform ADLs  what is the limiting factor(s)? Do they have a support system that can assist? If no support system is present, discuss possible assistance that they may be able to obtain.) Patient states he is independent with ADL's and ambulation. Patient states he is doing well and states he had follow up appointment with [facility] with Dr. Gary Holley. Patient states visit went very well. Patient states he has family and friends that will assist him as needed. Does the patient have all follow-up appointments scheduled? 7 day f/up with PCP?    7-14 day f/up with specialist?    If f/up has not been made  what actions has the care coordinator made to accomplish this? Has transportation been arranged? [facility] follow-up should be within 7 days of discharge; all others should have PCP follow-up within 7 days of discharge; follow-ups with other specialists should be within 7-14 days of discharge.) Yes      NA    [facility] Office 03/21/2018 with Dr. Gary Holley. Yes, patient states he has transportation to appointments. NA   Any other questions or concerns expressed by the patient? Patient states no questions or concerns. Schedule next appointment with YOMAIRA Pedro or refer to RN Case Manager/  per the workflow guidelines. When is care coordinators next follow-up call scheduled? If referred for CCM  what RN care manager was the referral assigned? NA          NA      NA     DONNA Call Completed By: HAILEY Avila [facility]  Care Coordinator     This note will not be viewable in [address]

## 2018-04-10 PROBLEM — Z95.0 PACEMAKER: Status: ACTIVE | Noted: 2018-04-10

## 2018-05-17 PROBLEM — Z79.01 LONG TERM (CURRENT) USE OF ANTICOAGULANTS: Status: ACTIVE | Noted: 2018-05-17

## 2021-08-03 PROBLEM — I48.91 A-FIB (HCC): Status: RESOLVED | Noted: 2018-02-20 | Resolved: 2021-08-03
